# Patient Record
Sex: FEMALE | Race: WHITE | Employment: OTHER | URBAN - METROPOLITAN AREA
[De-identification: names, ages, dates, MRNs, and addresses within clinical notes are randomized per-mention and may not be internally consistent; named-entity substitution may affect disease eponyms.]

---

## 2017-01-10 ENCOUNTER — TELEPHONE (OUTPATIENT)
Dept: INTERNAL MEDICINE CLINIC | Facility: CLINIC | Age: 67
End: 2017-01-10

## 2017-09-28 ENCOUNTER — OFFICE VISIT (OUTPATIENT)
Dept: INTERNAL MEDICINE CLINIC | Facility: CLINIC | Age: 67
End: 2017-09-28

## 2017-09-28 ENCOUNTER — LAB ENCOUNTER (OUTPATIENT)
Dept: LAB | Age: 67
End: 2017-09-28
Attending: INTERNAL MEDICINE
Payer: MEDICARE

## 2017-09-28 VITALS
OXYGEN SATURATION: 98 % | WEIGHT: 165.19 LBS | TEMPERATURE: 98 F | HEIGHT: 63 IN | RESPIRATION RATE: 16 BRPM | BODY MASS INDEX: 29.27 KG/M2 | HEART RATE: 78 BPM | SYSTOLIC BLOOD PRESSURE: 114 MMHG | DIASTOLIC BLOOD PRESSURE: 66 MMHG

## 2017-09-28 DIAGNOSIS — Z00.00 ENCOUNTER FOR ANNUAL HEALTH EXAMINATION: Primary | ICD-10-CM

## 2017-09-28 DIAGNOSIS — Z85.41 HISTORY OF CERVICAL CANCER: ICD-10-CM

## 2017-09-28 DIAGNOSIS — Z12.31 ENCOUNTER FOR SCREENING MAMMOGRAM FOR MALIGNANT NEOPLASM OF BREAST: ICD-10-CM

## 2017-09-28 DIAGNOSIS — E78.5 MILD HYPERLIPIDEMIA: ICD-10-CM

## 2017-09-28 DIAGNOSIS — E55.9 VITAMIN D DEFICIENCY: ICD-10-CM

## 2017-09-28 DIAGNOSIS — Z91.09 ENVIRONMENTAL ALLERGIES: ICD-10-CM

## 2017-09-28 DIAGNOSIS — M85.80 OSTEOPENIA, UNSPECIFIED LOCATION: ICD-10-CM

## 2017-09-28 PROCEDURE — 80061 LIPID PANEL: CPT

## 2017-09-28 PROCEDURE — 96160 PT-FOCUSED HLTH RISK ASSMT: CPT | Performed by: INTERNAL MEDICINE

## 2017-09-28 PROCEDURE — 82306 VITAMIN D 25 HYDROXY: CPT

## 2017-09-28 PROCEDURE — 80053 COMPREHEN METABOLIC PANEL: CPT

## 2017-09-28 PROCEDURE — G0439 PPPS, SUBSEQ VISIT: HCPCS | Performed by: INTERNAL MEDICINE

## 2017-09-28 RX ORDER — FLUTICASONE PROPIONATE 50 MCG
1 SPRAY, SUSPENSION (ML) NASAL DAILY
COMMUNITY
End: 2018-04-04

## 2017-09-28 NOTE — PATIENT INSTRUCTIONS
Libertad Becerra's SCREENING SCHEDULE   Tests on this list are recommended by your physician but may not be covered, or covered at this frequency, by your insurer. Please check with your insurance carrier before scheduling to verify coverage.    PREVEN (once between ages 73-68) IPPE only No results found for this or any previous visit.  Limited to patients who meet one of the following criteria:   • Men who are 73-68 years old and have smoked more than 100 cigarettes in their lifetime   • Anyone with a fa needed after [address] due on 11/04/2017 Please get this Mammogram regularly   Immunizations      Influenza  Covered Annually No orders found for this or any previous visit.  Please get every year    Pneumococcal 13 (Prevnar)  Covered Once after 65 regarding Advance Directives.

## 2017-09-28 NOTE — PROGRESS NOTES
HPI:   Héctor Dove is a 75year old female who presents for a MA (Medicare Advantage) Supervisit (Once per calendar year). Patient doing well . Declined all vaccinations- does not believe in them.   UTD on cscope (2014), had a sessile polp, nex total) by mouth daily. MEDICAL INFORMATION:   She  has a past medical history of CANCER. She  has a past surgical history that includes hysterectomy and colonoscopy (11/13/14).     Her family history includes Breast Cancer (age of onset: 79) in her gums normal   Neck: Supple, symmetrical, trachea midline, no adenopathy;  thyroid: not enlarged, symmetric, no tenderness/mass/nodules; no carotid bruit or JVD   Back:   Symmetric, no curvature, ROM normal, no CVA tenderness   Lungs:   Clear to auscultatio bearing exercises         Ms. Daniel Mcnamara does not currently take aspirin. Patient agrees to start aspirin, see order      Diet assessment: good     Advanced Directive:  Living Will on file in [address]?   Sterling Elias does not have a Living Will on file in more medical conditions?: 0-No         Do you accidently lose urine?: 1-Yes    Do you have difficulty seeing?: 0-No    Do you have any difficulty walking or getting up?: 0-No    Do you have any tripping hazards?: 0-No    Are you on multiple medications?: 0 Electrocardiogram date10/23/2014       Colorectal Cancer Screening      Colonoscopy Screen every 10 years Colonoscopy,5 Years due on 11/13/2019 Update Health Maintenance if applicable    Flex Sigmoidoscopy Screen every 10 years No results found for this or be covered with your prescription benefits, but Medicare does not cover unless Medically needed    Zoster  Not covered by Medicare Part B No vaccine history found This may be covered with your pharmacy  prescription benefits        SPECIFIC DISEASE MONITOR

## 2017-10-02 RX ORDER — ERGOCALCIFEROL 1.25 MG/1
50000 CAPSULE ORAL WEEKLY
Qty: 8 CAPSULE | Refills: 0 | Status: SHIPPED | OUTPATIENT
Start: 2017-10-02 | End: 2017-11-01

## 2017-10-02 NOTE — PROGRESS NOTES
See TE on 10/4/17. Called Pt to discuss results - pt was advised to take vit d 50K weekly for 8 weeks a- medication pended for approval.   Pt also states that at [address] [place] [address] 9/28/17- Pt was to f/u with Derm, not noted. Please generate if appropriate.

## 2017-10-04 ENCOUNTER — TELEPHONE (OUTPATIENT)
Dept: INTERNAL MEDICINE CLINIC | Facility: CLINIC | Age: 67
End: 2017-10-04

## 2017-10-04 DIAGNOSIS — L98.9 SKIN ABNORMALITY: Primary | ICD-10-CM

## 2017-10-04 NOTE — TELEPHONE ENCOUNTER
Patient was in last week for her medicare wellness  At that time Dr Servando Bo told patient that she was a bit concerned about a spot on her skin on her chest  She told her she would give her a name and someone would call her on a referral for that.   Patient

## 2017-10-04 NOTE — TELEPHONE ENCOUNTER
Please advise Derm recommendation- referral pended for your approval if ok. Please review and advise. Thank you.

## 2017-10-04 NOTE — PROGRESS NOTES
HPI:    Patient ID: Randy Paris is a 75year old female. HPI    Review of Systems         Current Outpatient Prescriptions:  ergocalciferol 95699 units Oral Cap Take 1 capsule (50,000 Units total) by mouth once a week.  Disp: 8 capsule Rfl: 0   F

## 2017-10-04 NOTE — TELEPHONE ENCOUNTER
There is nothing in progress note about abnormal skin lesion. Can patient wait until TB returns to office for clarification?

## 2017-10-05 NOTE — TELEPHONE ENCOUNTER
Lm for pt (Ozzy Sanchez per HIPAA) to inform, per TB, derm referral generated and authorized-   Dr. Eloisa Felix  [address] [address] [facility], [address] [place] [address] Rd   Phone: [phone]   To call back at the office if any further questions.

## 2017-11-06 ENCOUNTER — HOSPITAL ENCOUNTER (OUTPATIENT)
Dept: MAMMOGRAPHY | Age: 67
Discharge: HOME | End: 2017-11-06
Attending: INTERNAL MEDICINE
Payer: MEDICARE

## 2017-11-06 DIAGNOSIS — Z12.31 ENCOUNTER FOR SCREENING MAMMOGRAM FOR MALIGNANT NEOPLASM OF BREAST: ICD-10-CM

## 2017-11-06 PROCEDURE — 77067 SCR MAMMO BI INCL CAD: CPT | Performed by: INTERNAL MEDICINE

## 2018-01-17 ENCOUNTER — TELEPHONE (OUTPATIENT)
Dept: INTERNAL MEDICINE CLINIC | Facility: CLINIC | Age: 68
End: 2018-01-17

## 2018-02-05 ENCOUNTER — TELEPHONE (OUTPATIENT)
Dept: INTERNAL MEDICINE CLINIC | Facility: CLINIC | Age: 68
End: 2018-02-05

## 2018-02-05 NOTE — TELEPHONE ENCOUNTER
Patient states she would like to leave a message for Dr Anju Gruber. She wants to know her opinion on rejuvenated stem cell therapy. She states there was an ad in the paper about this with [facility]   She states Dr Anju Gruber can e-mail her

## 2018-02-07 ENCOUNTER — TELEPHONE (OUTPATIENT)
Dept: INTERNAL MEDICINE CLINIC | Facility: CLINIC | Age: 68
End: 2018-02-07

## 2018-02-07 NOTE — TELEPHONE ENCOUNTER
Called pt to inform, per TB, does not have enough information on this to advise her for or against it. Pt verbalized understanding.

## 2018-02-07 NOTE — TELEPHONE ENCOUNTER
I called pt to schedule supervisit pt declined at this time. Pt stated a nurse called her yesterday. Please call back.

## 2018-03-12 ENCOUNTER — OFFICE VISIT (OUTPATIENT)
Dept: INTERNAL MEDICINE CLINIC | Facility: CLINIC | Age: 68
End: 2018-03-12

## 2018-03-12 VITALS
DIASTOLIC BLOOD PRESSURE: 80 MMHG | TEMPERATURE: 98 F | HEIGHT: 63 IN | HEART RATE: 80 BPM | SYSTOLIC BLOOD PRESSURE: 130 MMHG | RESPIRATION RATE: 12 BRPM | WEIGHT: 161.19 LBS | BODY MASS INDEX: 28.56 KG/M2

## 2018-03-12 DIAGNOSIS — H66.93 ACUTE OTITIS MEDIA, BILATERAL: Primary | ICD-10-CM

## 2018-03-12 DIAGNOSIS — M79.602 LEFT ARM PAIN: ICD-10-CM

## 2018-03-12 DIAGNOSIS — R94.31 ABNORMAL EKG: ICD-10-CM

## 2018-03-12 DIAGNOSIS — R06.00 DOE (DYSPNEA ON EXERTION): ICD-10-CM

## 2018-03-12 PROCEDURE — 93000 ELECTROCARDIOGRAM COMPLETE: CPT | Performed by: INTERNAL MEDICINE

## 2018-03-12 PROCEDURE — 99213 OFFICE O/P EST LOW 20 MIN: CPT | Performed by: INTERNAL MEDICINE

## 2018-03-12 RX ORDER — AMOXICILLIN AND CLAVULANATE POTASSIUM 875; 125 MG/1; MG/1
1 TABLET, FILM COATED ORAL 2 TIMES DAILY
Qty: 20 TABLET | Refills: 0 | Status: SHIPPED | OUTPATIENT
Start: 2018-03-12 | End: 2018-03-22

## 2018-03-12 NOTE — PROGRESS NOTES
Miguel Tidwell is a 75year old female. Patient presents with:  Ear Pain: pt is having ear ache on both sides mostly the L side going down L shoulder up to the arm x 1 week.  LB-room 4      HPI:     Patient here with complaints of bilateral ear pain a rashes  RESPIRATORY: + shortness of breath with exertion, no cough  CARDIOVASCULAR: denies chest pain on exertion, no palpatations  GI: denies abdominal pain     EXAM:   /76 (BP Location: Right arm, Patient Position: Sitting, Cuff Size: adult)   Puls

## 2018-03-19 ENCOUNTER — TELEPHONE (OUTPATIENT)
Dept: INTERNAL MEDICINE CLINIC | Facility: CLINIC | Age: 68
End: 2018-03-19

## 2018-03-19 NOTE — TELEPHONE ENCOUNTER
[id number] [address] to wait until 3/26 for stress test  If any CP or difficulty breathing (acute), advise to go to ER  Fatigue may be from infection, still should be on ABX

## 2018-03-19 NOTE — TELEPHONE ENCOUNTER
Pt has stress test scheduled 3-26-18 and is wondering if it is OK to wait until then. Pt was in to see TB 3-12-18. Pt still feels very worn out/exhausted easily, even doing daily chores. Experiencing SOB when doing things. Not experiencing at time of call.

## 2018-03-19 NOTE — TELEPHONE ENCOUNTER
Spoke with patient informed appt. On 3/26/2018 for stress test is ok, patient advised to go to ER if CP or difficulty breathing. Informed fatigue may be from infection, she will continue taking ABX. Patient to call our office if any further qustions.  Kely

## 2018-03-20 ENCOUNTER — TELEPHONE (OUTPATIENT)
Dept: INTERNAL MEDICINE CLINIC | Facility: CLINIC | Age: 68
End: 2018-03-20

## 2018-03-26 ENCOUNTER — HOSPITAL ENCOUNTER (OUTPATIENT)
Dept: CV DIAGNOSTICS | Facility: HOSPITAL | Age: 68
Discharge: HOME | End: 2018-03-26
Attending: INTERNAL MEDICINE
Payer: MEDICARE

## 2018-03-26 DIAGNOSIS — R06.00 DOE (DYSPNEA ON EXERTION): ICD-10-CM

## 2018-03-26 DIAGNOSIS — M79.602 LEFT ARM PAIN: ICD-10-CM

## 2018-03-26 DIAGNOSIS — R94.31 ABNORMAL EKG: ICD-10-CM

## 2018-03-26 PROCEDURE — 93350 STRESS TTE ONLY: CPT | Performed by: INTERNAL MEDICINE

## 2018-03-26 PROCEDURE — 93017 CV STRESS TEST TRACING ONLY: CPT | Performed by: INTERNAL MEDICINE

## 2018-03-26 PROCEDURE — 93018 CV STRESS TEST I&R ONLY: CPT | Performed by: INTERNAL MEDICINE

## 2018-04-03 ENCOUNTER — OFFICE VISIT (OUTPATIENT)
Dept: INTERNAL MEDICINE CLINIC | Facility: CLINIC | Age: 68
End: 2018-04-03

## 2018-04-03 VITALS
RESPIRATION RATE: 16 BRPM | DIASTOLIC BLOOD PRESSURE: 78 MMHG | HEIGHT: 62.5 IN | BODY MASS INDEX: 27.81 KG/M2 | HEART RATE: 92 BPM | WEIGHT: 155 LBS | OXYGEN SATURATION: 96 % | SYSTOLIC BLOOD PRESSURE: 130 MMHG | TEMPERATURE: 98 F

## 2018-04-03 DIAGNOSIS — R06.00 DOE (DYSPNEA ON EXERTION): Primary | ICD-10-CM

## 2018-04-03 DIAGNOSIS — M72.2 PLANTAR FASCIITIS: ICD-10-CM

## 2018-04-03 DIAGNOSIS — R05.8 DRY COUGH: ICD-10-CM

## 2018-04-03 DIAGNOSIS — R63.4 WEIGHT LOSS: ICD-10-CM

## 2018-04-03 PROBLEM — R06.09 DOE (DYSPNEA ON EXERTION): Status: ACTIVE | Noted: 2018-04-03

## 2018-04-03 PROCEDURE — 99214 OFFICE O/P EST MOD 30 MIN: CPT | Performed by: INTERNAL MEDICINE

## 2018-04-04 ENCOUNTER — TELEPHONE (OUTPATIENT)
Dept: INTERNAL MEDICINE CLINIC | Facility: CLINIC | Age: 68
End: 2018-04-04

## 2018-04-04 ENCOUNTER — LAB ENCOUNTER (OUTPATIENT)
Dept: LAB | Age: 68
DRG: 378 | End: 2018-04-04
Attending: INTERNAL MEDICINE
Payer: MEDICARE

## 2018-04-04 ENCOUNTER — HOSPITAL ENCOUNTER (INPATIENT)
Facility: HOSPITAL | Age: 68
LOS: 1 days | Discharge: HOME | DRG: 378 | End: 2018-04-05
Attending: EMERGENCY MEDICINE | Admitting: INTERNAL MEDICINE
Payer: MEDICARE

## 2018-04-04 ENCOUNTER — HOSPITAL ENCOUNTER (OUTPATIENT)
Dept: GENERAL RADIOLOGY | Facility: HOSPITAL | Age: 68
Discharge: HOME | DRG: 378 | End: 2018-04-04
Attending: INTERNAL MEDICINE
Payer: MEDICARE

## 2018-04-04 DIAGNOSIS — K92.2 GASTROINTESTINAL HEMORRHAGE, UNSPECIFIED GASTROINTESTINAL HEMORRHAGE TYPE: Primary | ICD-10-CM

## 2018-04-04 DIAGNOSIS — R05.8 DRY COUGH: ICD-10-CM

## 2018-04-04 DIAGNOSIS — R63.4 WEIGHT LOSS: ICD-10-CM

## 2018-04-04 DIAGNOSIS — D64.9 ANEMIA, UNSPECIFIED TYPE: ICD-10-CM

## 2018-04-04 DIAGNOSIS — R06.00 DOE (DYSPNEA ON EXERTION): ICD-10-CM

## 2018-04-04 PROCEDURE — 85025 COMPLETE CBC W/AUTO DIFF WBC: CPT

## 2018-04-04 PROCEDURE — 84443 ASSAY THYROID STIM HORMONE: CPT

## 2018-04-04 PROCEDURE — 99222 1ST HOSP IP/OBS MODERATE 55: CPT | Performed by: INTERNAL MEDICINE

## 2018-04-04 PROCEDURE — 30233N1 TRANSFUSION OF NONAUTOLOGOUS RED BLOOD CELLS INTO PERIPHERAL VEIN, PERCUTANEOUS APPROACH: ICD-10-PCS | Performed by: EMERGENCY MEDICINE

## 2018-04-04 PROCEDURE — 80053 COMPREHEN METABOLIC PANEL: CPT

## 2018-04-04 PROCEDURE — 71046 X-RAY EXAM CHEST 2 VIEWS: CPT | Performed by: INTERNAL MEDICINE

## 2018-04-04 PROCEDURE — 84439 ASSAY OF FREE THYROXINE: CPT

## 2018-04-04 RX ORDER — ACETAMINOPHEN 325 MG/1
650 TABLET ORAL EVERY 6 HOURS PRN
Status: DISCONTINUED | OUTPATIENT
Start: 2018-04-04 | End: 2018-04-05

## 2018-04-04 RX ORDER — SODIUM CHLORIDE 9 MG/ML
INJECTION, SOLUTION INTRAVENOUS CONTINUOUS
Status: DISCONTINUED | OUTPATIENT
Start: 2018-04-04 | End: 2018-04-05

## 2018-04-04 RX ORDER — ONDANSETRON 2 MG/ML
4 INJECTION INTRAMUSCULAR; INTRAVENOUS EVERY 6 HOURS PRN
Status: DISCONTINUED | OUTPATIENT
Start: 2018-04-04 | End: 2018-04-05

## 2018-04-04 NOTE — TELEPHONE ENCOUNTER
Called s/w patient informed of critical lab result Hgb at 5.7, patient advised very important to go to ER, patient verbalized understanding and agreeable to POC.

## 2018-04-04 NOTE — TELEPHONE ENCOUNTER
To  On call-AS-Spoke with Jamin Snow at [address] lab calling in regards to critical lab results Hgb at 5.7. Please advise.

## 2018-04-04 NOTE — PROGRESS NOTES
Karlene Guerra is a 75year old female. Patient presents with: Follow - Up: OCTAV[facility] RM 4 stress echo came back normal still having shortness of breath  Weight Loss  Fatigue      HPI:     Patient here for f/u-  Seen 3/12 for otitis media and TERAN.  OM sympt skin turned orange.       REVIEW OF SYSTEMS:   GENERAL HEALTH: lost weight, no fevers or chills  SKIN: denies any unusual skin lesions or rashes  RESPIRATORY: + cough  CARDIOVASCULAR: denies chest pain   GI: denies abdominal pain   : no dysuria  NEURO: de

## 2018-04-04 NOTE — ED PROVIDER NOTES
Patient Seen in: [facility] Emergency Department    History   Patient presents with:  Abnormal Result (metabolic, cardiac)    Stated Complaint: abn labs- low hgb    HPI    Gretta Ward is a 18-year-old female presenting to the emergency department for a auscultation bilaterally with no wheezes retractions or rhonchi noted  Cardiovascular exam shows a regular rate and rhythm  Abdomen soft nontender with no rebound tenderness noted  Extremity exam shows no clubbing cyanosis or edema  Skin exam shows no rash Apr 04 1930  ------------------------------------------------------------       MDM   Patient's Hemoccult positive with black stool. The patient was typed and crossed for 1 unit of packed red blood cells but more will likely have to be given.   Patient rem

## 2018-04-04 NOTE — ED INITIAL ASSESSMENT (HPI)
Patient arrives after labs reveal hgb of 5.7. Patient states she has been feeling run down and TERAN for past three weeks.  Patient pale upon arrival.

## 2018-04-05 ENCOUNTER — SURGERY (OUTPATIENT)
Age: 68
End: 2018-04-05

## 2018-04-05 VITALS
HEIGHT: 62 IN | RESPIRATION RATE: 16 BRPM | HEART RATE: 65 BPM | SYSTOLIC BLOOD PRESSURE: 131 MMHG | BODY MASS INDEX: 28.1 KG/M2 | DIASTOLIC BLOOD PRESSURE: 65 MMHG | TEMPERATURE: 98 F | OXYGEN SATURATION: 94 % | WEIGHT: 152.69 LBS

## 2018-04-05 PROCEDURE — 99233 SBSQ HOSP IP/OBS HIGH 50: CPT | Performed by: HOSPITALIST

## 2018-04-05 PROCEDURE — 0DB78ZX EXCISION OF STOMACH, PYLORUS, VIA NATURAL OR ARTIFICIAL OPENING ENDOSCOPIC, DIAGNOSTIC: ICD-10-PCS | Performed by: STUDENT IN AN ORGANIZED HEALTH CARE EDUCATION/TRAINING PROGRAM

## 2018-04-05 PROCEDURE — 0DB68ZX EXCISION OF STOMACH, VIA NATURAL OR ARTIFICIAL OPENING ENDOSCOPIC, DIAGNOSTIC: ICD-10-PCS | Performed by: STUDENT IN AN ORGANIZED HEALTH CARE EDUCATION/TRAINING PROGRAM

## 2018-04-05 RX ORDER — PANTOPRAZOLE SODIUM 40 MG/1
40 TABLET, DELAYED RELEASE ORAL
Status: DISCONTINUED | OUTPATIENT
Start: 2018-04-05 | End: 2018-04-05

## 2018-04-05 RX ORDER — SODIUM CHLORIDE 9 MG/ML
INJECTION, SOLUTION INTRAVENOUS ONCE
Status: DISCONTINUED | OUTPATIENT
Start: 2018-04-05 | End: 2018-04-05

## 2018-04-05 RX ORDER — MIDAZOLAM HYDROCHLORIDE 1 MG/ML
INJECTION INTRAMUSCULAR; INTRAVENOUS
Status: DISCONTINUED | OUTPATIENT
Start: 2018-04-05 | End: 2018-04-05 | Stop reason: HOSPADM

## 2018-04-05 RX ORDER — PANTOPRAZOLE SODIUM 40 MG/1
40 TABLET, DELAYED RELEASE ORAL
Qty: 60 TABLET | Refills: 2 | Status: SHIPPED | OUTPATIENT
Start: 2018-04-05 | End: 2018-07-04

## 2018-04-05 RX ORDER — PANTOPRAZOLE SODIUM 40 MG/1
40 TABLET, DELAYED RELEASE ORAL
Qty: 60 TABLET | Refills: 2 | Status: SHIPPED | OUTPATIENT
Start: 2018-04-05 | End: 2018-04-05

## 2018-04-05 RX ORDER — SODIUM CHLORIDE 9 MG/ML
INJECTION, SOLUTION INTRAVENOUS ONCE
Status: COMPLETED | OUTPATIENT
Start: 2018-04-05 | End: 2018-04-05

## 2018-04-05 NOTE — H&P
[facility]ITAL[facility]  History and Physical     Santykati Montemayor Patient Status:  Emergency    1950 MRN [id number]   Location [address] [facility] Attending Debra Gimenez MD   Hosp Day # 0 PCP Rob Dalton MD     Chief Compla Encounter:  Cholecalciferol (VITAMIN D) 1000 units Oral Tab Take by mouth. Disp:  Rfl:    Fluticasone Propionate 50 MCG/ACT Nasal Suspension 1 spray by Each Nare route daily. Disp:  Rfl:    aspirin 81 MG Oral Tab Take 1 tablet (81 mg total) by mouth daily. upper GI bleed -PRBCs 1 unit at time for goal above 7,    IV PPI, n.p.o., GI consultation for possible EGD tomorrow,    IV fluids   q8 H&H  2. Anemia -most likely due to blood loss, obtain iron panel and reticulocyte count  3.  History of cervical cancer

## 2018-04-05 NOTE — PLAN OF CARE
Maintains adequate nutritional intake (undernourished) Not Progressing      Maintains or returns to baseline bowel function Progressing      Maintains hematologic stability Progressing    Afebrile, denies pain, abdomen is soft and non tender, no BM this sh

## 2018-04-05 NOTE — PAYOR COMM NOTE
--------------  ADMISSION REVIEW       4/4    ED  LATE ADMIT     Patient presents with:  Abnormal Result     Stated Complaint: abn labs- low hgb        13-year-old female presenting to the emergency department for abnormal labs.    + WORSENING FATIGUE  The    EKG     Rate, intervals and axes as noted on EKG Report.   No rate: 79  Rhythm: Sinus Rhythm  Reading: No areas of acute ST segment elevation or depression     ED COURSE   Patient's Hemoccult positive with black stool.

## 2018-04-05 NOTE — PROGRESS NOTES
Alert,oriented. Admitted with Hgb of 5.9.2 units of packed cells completed this shift. No complaint of pain.   NPO for possible EGD this AM.Will recheck hgb at 10am.

## 2018-04-05 NOTE — OPERATIVE REPORT
EGD operative report  Patient Name: Alexandre Eaton  Procedure: Esophagogastroduodenoscopy with biopsy   Indication: anemia, weight loss, early satiety  Attending: Alisha Zelaya M.D.   Consent:  The risks, benefits, and alternatives were discussed with ulcers or stigmata of recent bleeding. Biopsies were obtained from the antrum, body, and fundus, to evaluate for H.pylori.    Duodenum: The duodenal bulb, post-bulbar duodenum, and descending duodenum were normal.  Impression:   1) Large hiatal hernia (7 c

## 2018-04-05 NOTE — CONSULTS
[address]  Report of GI Consultation    Haydenhunter Carrillo Patient Status:  Inpatient    1950 MRN [id number]   [facility] 4NW-A Attending Dm Ortiz MD   Hosp Day # 1 PCP Jyotsna Reyes MD     Date of Admission:  2018  D Medications:    Current Facility-Administered Medications:  0.9%  NaCl infusion  Intravenous Once   ondansetron HCl (ZOFRAN) injection 4 mg 4 mg Intravenous Q6H PRN   0.9%  NaCl infusion  Intravenous Continuous   acetaminophen (TYLENOL) tab 650 mg 650 mg O --    AST  12*   --    --    --    ALKPHO  74   --    --    --    BILT  0.3   --    --    --    HGB  5.7*  5.9*  6.9*  8.2*   WBC  7.6  7.5  14.0*   --        Imaging:  Xr Chest Pa + Lat Chest (cpt=71046)    Result Date: 4/4/2018  CONCLUSION:  No active

## 2018-04-05 NOTE — PLAN OF CARE
NURSING ADMISSION NOTE      Patient admitted via cart  Oriented to room. Safety precautions initiated. Bed in low position. Call light in reach. Pt alert and oriented x4. Blood transfusing at this time, no reactions noted. VSS and afebrile.   Ad

## 2018-04-05 NOTE — PROGRESS NOTES
[facility]  Progress Note     Miguel Tidwell Patient Status:  Inpatient    1950 MRN [id number]   [facility] 4NW-A Attending Sav Kumar MD   Hosp Day # 1 PCP Rocío Bowman MD     Chief Complaint: Anemia    S: Patient f • sodium chloride   Intravenous Once   • pantoprazole (PROTONIX) IV push  40 mg Intravenous 2 times per day   • iron sucrose  200 mg Intravenous Daily       ASSESSMENT / PLAN:     1. Suspected upper GI bleed  1.  Keep NPO  2. IV PPi BID  3. GI to eval for

## 2018-04-06 NOTE — DISCHARGE SUMMARY
[facility]  DISCHARGE SUMMARY     Luda Lopes Patient Status:  Inpatient    1950 MRN [id number]   [facility] 4NW-A Attending No att. providers found   Hosp Day # 1 PCP Alex Bryan MD     Date of Admission: 2018 She is not on any iron supplements nor has she taken any Pepto-Bismol. She denies chest pain palpitations syncope vision changes focal weakness dysuria swelling rash.   She has had colonoscopies as scheduled for routine screening without any abnormalities CONTINUE taking these medications      Instructions Prescription details   aspirin 81 MG Tabs      Take 1 tablet (81 mg total) by mouth daily. Quantity:  30 tablet  Refills:  11     Vitamin D 1000 units Tabs      Take 1,000 Units by mouth daily.    Refi

## 2018-04-09 ENCOUNTER — TELEPHONE (OUTPATIENT)
Dept: INTERNAL MEDICINE CLINIC | Facility: CLINIC | Age: 68
End: 2018-04-09

## 2018-04-09 DIAGNOSIS — K92.2 GASTROINTESTINAL HEMORRHAGE, UNSPECIFIED GASTROINTESTINAL HEMORRHAGE TYPE: Primary | ICD-10-CM

## 2018-04-09 NOTE — TELEPHONE ENCOUNTER
Patient states she has an appointment with Charley Matias this Wednesday at 11:00 and is requesting a referral.  Please advise. She is seeing Dr. Monie Zavaleta. Patient would like a call once referral is placed.

## 2018-04-16 ENCOUNTER — LAB ENCOUNTER (OUTPATIENT)
Dept: LAB | Age: 68
End: 2018-04-16
Attending: NURSE PRACTITIONER
Payer: MEDICARE

## 2018-04-16 DIAGNOSIS — D50.8 OTHER IRON DEFICIENCY ANEMIA: ICD-10-CM

## 2018-04-16 PROCEDURE — 85025 COMPLETE CBC W/AUTO DIFF WBC: CPT

## 2018-05-14 ENCOUNTER — TELEPHONE (OUTPATIENT)
Dept: INTERNAL MEDICINE CLINIC | Facility: CLINIC | Age: 68
End: 2018-05-14

## 2018-05-14 NOTE — TELEPHONE ENCOUNTER
Please advise if ok to use Good Sense Nasal Allergy Spray due to hx of cataract surgery:  Copied information from Good Sense Website:  Fast Relief for Sinus Congestion  Compare to the active ingredient in Afrin® Sinus Nasal Spray.  GoodSense® Sinus Nasal Sp

## 2018-05-14 NOTE — TELEPHONE ENCOUNTER
Patient states she has the following nasal allergy spray:    Good Sense - Distributed by: [employer]    She wants to know if it's safe for her to use due to previous cataract surgery.

## 2018-05-23 ENCOUNTER — LAB ENCOUNTER (OUTPATIENT)
Dept: LAB | Age: 68
End: 2018-05-23
Attending: NURSE PRACTITIONER
Payer: MEDICARE

## 2018-05-23 DIAGNOSIS — D64.9 ANEMIA, UNSPECIFIED TYPE: ICD-10-CM

## 2018-05-23 PROCEDURE — 85025 COMPLETE CBC W/AUTO DIFF WBC: CPT

## 2018-05-31 ENCOUNTER — TELEPHONE (OUTPATIENT)
Dept: INTERNAL MEDICINE CLINIC | Facility: CLINIC | Age: 68
End: 2018-05-31

## 2018-06-01 NOTE — TELEPHONE ENCOUNTER
Called pt to inform not recommend tylenol PM during the day as it has a sleeping aid that will cause pt to be drowzy. May try extra strength Tylenol during the day as needed. Pt verbalized understanding and agreed with POC.

## 2018-06-01 NOTE — TELEPHONE ENCOUNTER
Patient called stating she didn't get a response yesterday. I told her that Dr ELAM didn't have a chance to let us know until almost midnight. She understood. I read her the message from Dr ELAM and she wants to know if there is anything else?   She states s

## 2018-06-01 NOTE — TELEPHONE ENCOUNTER
Called pt to inform, per TB, no aleve due to gastric inflammation. Advised only tylenol prn. Pt verbalized understanding and agreed with POC.

## 2018-06-01 NOTE — TELEPHONE ENCOUNTER
Pt called asking if she can take something other than tylenol PM? She has taken xtra strength before-didn't help-can she take PM during the day?  Call to advise

## 2018-06-07 ENCOUNTER — LAB REQUISITION (OUTPATIENT)
Dept: LAB | Facility: HOSPITAL | Age: 68
End: 2018-06-07
Payer: MEDICARE

## 2018-06-07 DIAGNOSIS — D50.9 IRON DEFICIENCY ANEMIA: ICD-10-CM

## 2018-06-07 PROCEDURE — 88305 TISSUE EXAM BY PATHOLOGIST: CPT | Performed by: INTERNAL MEDICINE

## 2018-06-25 ENCOUNTER — APPOINTMENT (OUTPATIENT)
Dept: LAB | Age: 68
End: 2018-06-25
Attending: INTERNAL MEDICINE
Payer: MEDICARE

## 2018-06-25 DIAGNOSIS — D50.9 IRON DEFICIENCY ANEMIA, UNSPECIFIED IRON DEFICIENCY ANEMIA TYPE: ICD-10-CM

## 2018-06-25 PROCEDURE — 83550 IRON BINDING TEST: CPT

## 2018-06-25 PROCEDURE — 83540 ASSAY OF IRON: CPT

## 2018-07-30 ENCOUNTER — OFFICE VISIT (OUTPATIENT)
Dept: INTERNAL MEDICINE CLINIC | Facility: CLINIC | Age: 68
End: 2018-07-30

## 2018-07-30 ENCOUNTER — TELEPHONE (OUTPATIENT)
Dept: INTERNAL MEDICINE CLINIC | Facility: CLINIC | Age: 68
End: 2018-07-30

## 2018-07-30 ENCOUNTER — HOSPITAL ENCOUNTER (OUTPATIENT)
Dept: GENERAL RADIOLOGY | Age: 68
Discharge: HOME | End: 2018-07-30
Attending: INTERNAL MEDICINE
Payer: MEDICARE

## 2018-07-30 VITALS
TEMPERATURE: 98 F | SYSTOLIC BLOOD PRESSURE: 138 MMHG | DIASTOLIC BLOOD PRESSURE: 88 MMHG | BODY MASS INDEX: 27.64 KG/M2 | HEART RATE: 64 BPM | HEIGHT: 63 IN | WEIGHT: 156 LBS

## 2018-07-30 DIAGNOSIS — M25.561 ACUTE PAIN OF RIGHT KNEE: ICD-10-CM

## 2018-07-30 DIAGNOSIS — M54.31 RIGHT SIDED SCIATICA: Primary | ICD-10-CM

## 2018-07-30 DIAGNOSIS — B35.1 ONYCHOMYCOSIS OF LEFT GREAT TOE: ICD-10-CM

## 2018-07-30 PROCEDURE — 73560 X-RAY EXAM OF KNEE 1 OR 2: CPT | Performed by: INTERNAL MEDICINE

## 2018-07-30 PROCEDURE — 99214 OFFICE O/P EST MOD 30 MIN: CPT | Performed by: INTERNAL MEDICINE

## 2018-07-30 RX ORDER — METHYLPREDNISOLONE 4 MG/1
TABLET ORAL
Qty: 1 KIT | Refills: 0 | Status: SHIPPED | OUTPATIENT
Start: 2018-07-30 | End: 2018-11-07

## 2018-07-30 RX ORDER — PANTOPRAZOLE SODIUM 40 MG/1
40 TABLET, DELAYED RELEASE ORAL
Qty: 30 TABLET | Refills: 0 | Status: SHIPPED | OUTPATIENT
Start: 2018-07-30 | End: 2018-09-11

## 2018-07-30 NOTE — TELEPHONE ENCOUNTER
Called pt to inform, per TB, can try Dr. Marie Houston or any other provider in the group that accomodates with pt's schedule best or will see pt soonest. Pt verbalized understanding and agreed with POC.

## 2018-07-30 NOTE — TELEPHONE ENCOUNTER
Re methylPREDNISolone . Sherrill Magallanes Pt would like directions for taking script clarified. Was told by TB to take with food. Part of the scripts says  to take before bed. Does not eat any food before bed. Can it be taken with antacid pills?

## 2018-07-30 NOTE — TELEPHONE ENCOUNTER
Called pt to inform, per TB, recommends taking with food, even with few crackers is fine. Ok to take with antacids. Pt verbalized understanding and agreed with POC.

## 2018-07-30 NOTE — PROGRESS NOTES
Lizeth Reilly is a 70year old female. Patient presents with:  Back Pain  Knee Pain  Toenail: turning yellow      HPI:     Patient here with her  for several issues-  C/o acute on chronic LBP with radiation to right buttock and leg.  +h/o scia Smokeless tobacco: Never Used                      Alcohol use: Yes           0.0 oz/week     Comment: rare    Family History   Problem Relation Age of Onset   • Cancer Father    • Colon Cancer Father    • Cancer Mo encounter. Meds & Refills for this Visit:  Signed Prescriptions Disp Refills    methylPREDNISolone (MEDROL) 4 MG Oral Tablet Therapy Pack 1 kit 0      Sig: As directed.       Ciclopirox 8 % External Solution 6 mL 1      Sig: Apply layer to toenail ishmael

## 2018-07-30 NOTE — TELEPHONE ENCOUNTER
Please advise if pt should take before bed WITH food. Ok to take with antacids? Please advise, thank you.

## 2018-07-30 NOTE — TELEPHONE ENCOUNTER
Pt was referred to Dr. Fredo Ambrocio today at her appt with TB. Pt isnt able to get into Dr. Fredo Ambrocio until 08/20/18. Wants to know if there is someone that she can get into sooner.      Please advise

## 2018-09-04 ENCOUNTER — MED REC SCAN ONLY (OUTPATIENT)
Dept: INTERNAL MEDICINE CLINIC | Facility: CLINIC | Age: 68
End: 2018-09-04

## 2018-09-11 NOTE — TELEPHONE ENCOUNTER
LOV: 07/30/2018  Future Visit: No appointment scheduled   Last Labs: 05/23/2018 RBC, CBC  Last Rx: 07/30/2018  Per protocol sent to provider

## 2018-09-12 RX ORDER — PANTOPRAZOLE SODIUM 40 MG/1
TABLET, DELAYED RELEASE ORAL
Qty: 30 TABLET | Refills: 5 | Status: SHIPPED | OUTPATIENT
Start: 2018-09-12 | End: 2019-03-10

## 2018-10-11 ENCOUNTER — TELEPHONE (OUTPATIENT)
Dept: INTERNAL MEDICINE CLINIC | Facility: CLINIC | Age: 68
End: 2018-10-11

## 2018-10-11 DIAGNOSIS — Z13.228 SCREENING FOR METABOLIC DISORDER: ICD-10-CM

## 2018-10-11 DIAGNOSIS — Z13.29 SCREENING FOR THYROID DISORDER: ICD-10-CM

## 2018-10-11 DIAGNOSIS — E78.00 PURE HYPERCHOLESTEROLEMIA: Primary | ICD-10-CM

## 2018-10-11 DIAGNOSIS — D64.9 ANEMIA, UNSPECIFIED TYPE: ICD-10-CM

## 2018-10-11 NOTE — TELEPHONE ENCOUNTER
Future Appointments   Date Time Provider Jeovany Fierro   11/7/2018 10:30 AM Agatha Helton MD [facility]     Patient scheduled for her Supervisit. Please place orders with THE [facility]. Patient will be fasting.

## 2018-10-29 ENCOUNTER — LAB ENCOUNTER (OUTPATIENT)
Dept: LAB | Age: 68
End: 2018-10-29
Attending: INTERNAL MEDICINE
Payer: MEDICARE

## 2018-10-29 DIAGNOSIS — D64.9 ANEMIA, UNSPECIFIED TYPE: ICD-10-CM

## 2018-10-29 DIAGNOSIS — Z13.29 SCREENING FOR THYROID DISORDER: ICD-10-CM

## 2018-10-29 DIAGNOSIS — D50.9 IRON DEFICIENCY ANEMIA, UNSPECIFIED IRON DEFICIENCY ANEMIA TYPE: ICD-10-CM

## 2018-10-29 DIAGNOSIS — Z13.228 SCREENING FOR METABOLIC DISORDER: ICD-10-CM

## 2018-10-29 DIAGNOSIS — E78.00 PURE HYPERCHOLESTEROLEMIA: ICD-10-CM

## 2018-10-29 PROCEDURE — 83540 ASSAY OF IRON: CPT

## 2018-10-29 PROCEDURE — 85025 COMPLETE CBC W/AUTO DIFF WBC: CPT

## 2018-10-29 PROCEDURE — 83550 IRON BINDING TEST: CPT

## 2018-10-29 PROCEDURE — 80053 COMPREHEN METABOLIC PANEL: CPT

## 2018-10-29 PROCEDURE — 80061 LIPID PANEL: CPT

## 2018-10-29 PROCEDURE — 84443 ASSAY THYROID STIM HORMONE: CPT

## 2018-11-02 ENCOUNTER — TELEPHONE (OUTPATIENT)
Dept: INTERNAL MEDICINE CLINIC | Facility: CLINIC | Age: 68
End: 2018-11-02

## 2018-11-02 NOTE — TELEPHONE ENCOUNTER
Pt would like to talk to someone about her 10-29-18 labs. Pt is aware there are some being held for her visit.

## 2018-11-02 NOTE — TELEPHONE ENCOUNTER
Notes recorded by Sruthi Regalado MD on 10/30/2018 at 11:12 PM CDT  Labs ok - will discuss details in  E [facility] pt to review lab results. Informed Hb @14. 6. Confirmed OV on 11/7. Pt verbalized understanding and agreed with POC.

## 2018-11-04 NOTE — PROGRESS NOTES
Date: 2018    To: Juancarlos Chisholm  : 1950    I hope this letter finds you doing well. I am writing to inform you of the following:        The results of your recent lab tests were normal.         Please call the office at [phone] if

## 2018-11-07 ENCOUNTER — OFFICE VISIT (OUTPATIENT)
Dept: INTERNAL MEDICINE CLINIC | Facility: CLINIC | Age: 68
End: 2018-11-07

## 2018-11-07 VITALS
HEIGHT: 62.5 IN | BODY MASS INDEX: 27.34 KG/M2 | DIASTOLIC BLOOD PRESSURE: 70 MMHG | TEMPERATURE: 98 F | RESPIRATION RATE: 16 BRPM | SYSTOLIC BLOOD PRESSURE: 122 MMHG | HEART RATE: 84 BPM | WEIGHT: 152.38 LBS

## 2018-11-07 DIAGNOSIS — K21.9 GASTROESOPHAGEAL REFLUX DISEASE, ESOPHAGITIS PRESENCE NOT SPECIFIED: ICD-10-CM

## 2018-11-07 DIAGNOSIS — Z12.31 ENCOUNTER FOR SCREENING MAMMOGRAM FOR MALIGNANT NEOPLASM OF BREAST: ICD-10-CM

## 2018-11-07 DIAGNOSIS — M85.80 OSTEOPENIA, UNSPECIFIED LOCATION: ICD-10-CM

## 2018-11-07 DIAGNOSIS — Z87.19 HISTORY OF GI BLEED: ICD-10-CM

## 2018-11-07 DIAGNOSIS — Z00.00 ENCOUNTER FOR ANNUAL HEALTH EXAMINATION: Primary | ICD-10-CM

## 2018-11-07 DIAGNOSIS — E78.00 PURE HYPERCHOLESTEROLEMIA: ICD-10-CM

## 2018-11-07 PROBLEM — R63.4 WEIGHT LOSS: Status: RESOLVED | Noted: 2018-04-03 | Resolved: 2018-11-07

## 2018-11-07 PROBLEM — R06.00 DOE (DYSPNEA ON EXERTION): Status: RESOLVED | Noted: 2018-04-03 | Resolved: 2018-11-07

## 2018-11-07 PROBLEM — K92.2 GASTROINTESTINAL HEMORRHAGE: Status: RESOLVED | Noted: 2018-04-04 | Resolved: 2018-11-07

## 2018-11-07 PROBLEM — R06.09 DOE (DYSPNEA ON EXERTION): Status: RESOLVED | Noted: 2018-04-03 | Resolved: 2018-11-07

## 2018-11-07 PROCEDURE — 96160 PT-FOCUSED HLTH RISK ASSMT: CPT | Performed by: INTERNAL MEDICINE

## 2018-11-07 PROCEDURE — G0439 PPPS, SUBSEQ VISIT: HCPCS | Performed by: INTERNAL MEDICINE

## 2018-11-07 RX ORDER — NICOTINE POLACRILEX 2 MG
LOZENGE BUCCAL
COMMUNITY
Start: 2018-10-31 | End: 2020-10-02

## 2018-11-07 NOTE — PROGRESS NOTES
HPI:   Brian Gregory is a 70year old female who presents for a MA (Medicare Advantage) Supervisit (Once per calendar year). Patient here for supervisit. Doing great.  Right sciatica completely cleared, no back or leg pain now  Mild hyperlipidem tobacco.    CAGE Alcohol screening   Sundtimbo Paris was screened for Alcohol abuse and had a score of 0 so is at low risk.     Patient Care Team: Patient Care Team:  Raquel Yung MD as PCP - [facility]  [facility] Kelley Kuo [facility] (GASTROENTEROLOGY)    Kin Moreno (11/13/14); colonoscopy; egd; sigmoidoscopy,diagnostic; and ESOPHAGOGASTRODUODENOSCOPY (EGD) (N/A, 4/5/2018).     Her family history includes Breast Cancer (age of onset: 79) in her mother; Cancer in her father, mother, and paternal grandmother; Colon Cance midline, no adenopathy;  thyroid: not enlarged, symmetric, no tenderness/mass/nodules; no carotid bruit or JVD   Back:   Symmetric, no curvature, ROM normal, no CVA tenderness   Lungs:   Clear to auscultation bilaterally, respirations unlabored   Heart:  R for wellness.      Yolie Mai MD, 11/7/2018     General Health     In the past six months, have you lost more than 10 pounds without trying?: 2 - No  Has your appetite been poor?: No  How does the patient maintain a good energy level?: Appropriate Exerc yrs age 33-67, age 72 and older at high risk There are no preventive care reminders to display for this patient. Update Health Maintenance if applicable    Chlamydia  Annually if high risk No results found for: CHLAMYDIA No flowsheet data found.     Jose E Silvestre

## 2018-11-07 NOTE — PATIENT INSTRUCTIONS
Libertad Becerra's SCREENING SCHEDULE   Tests on this list are recommended by your physician but may not be covered, or covered at this frequency, by your insurer. Please check with your insurance carrier before scheduling to verify coverage.    PREVEN IPPE only No results found for this or any previous visit.  Limited to patients who meet one of the following criteria:   • Men who are 73-68 years old and have smoked more than 100 cigarettes in their lifetime   • Anyone with a family history    Colorectal 11/06/2018 Please get this Mammogram regularly   Immunizations      Influenza  Covered Annually No orders found for this or any previous visit.  Please get every year    Pneumococcal 13 (Prevnar)  Covered Once after 65 No orders found for this or any previo

## 2018-12-20 NOTE — TELEPHONE ENCOUNTER
Chief complaint:   Chief Complaint   Patient presents with   • Cancer     fuv       Vitals:  Visit Vitals  /72   Pulse 65   Ht 5' 3.5\" (1.613 m)   Wt 71.2 kg   LMP 01/16/2009   BMI 27.38 kg/m²       HISTORY OF PRESENT ILLNESS       Referring Provider: Dr. Jett Nuñez  Primary Provider:   Travon Rodriguez MD  Hematology oncologist: Dr. Souleymane Espino  Radiation Oncology: Dr. Vaughn    Earnestine Hernandez is a 58 year old female whom presents today for follow up examination.  She carries a diagnosis of Endometrial Cancer.    Patient is a 58-year-old female who originally presented to GYN oncology in December 2014 secondary to a biopsy proven grade 2 endometrial cancer. Patient subsequently underwent Robot assisted Total laparoscopic hysterectomy bilateral salpingo-oophorectomy with sentinel node dissection utilizing firefly technique per Dr. 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 A [place] histochemical staining was done to reveal that the patient's MSH6 expression was positive, putting the patient at risk for Wise syndrome.  The patient undergoes screening colonoscopies and at the direction of Dr. Monique Solitario regarding this.    Patient was subsequently started on chemotherapy under the direction of Dr. Souleymane Espino to include Carbo/Taxol.  She has since received 6 cycles of chemotherapy. She received cycle #6 on 6/22/2015. She then proceeded with 3 sessions vaginal cuff brachy therapy from 7/7/2015-7/20/2015.    Her last imaging was a CT of the chest and pelvis performed on 5/11/2017 which was negative for disease.    Most recent  was 7 on 12/18/2018.    Last colonoscopy was 07/28/2016.    She presents today 12/20/2018 in compliance with her surveillance visit. She states that she is feeling well overall  ROSIBELTCB to schedule Supervisit with no acute concerns. She states she did notice some intermittent spotting over the last few weeks. She denies any overt vaginal bleeding or discharge. She is noncompliant with dialator therapy.  She follows with Dr. Espino. She denies any changes in bowel function. She does have some incontinence but this remains unchanged. She denies abdominal or pelvic pain. Denies changes in appetite, bloating, early satiety, nausea, vomiting. Denies all other subjective complaints today.                                                                                               History of present illness as well as subsequent gynecologic care identified below in problem list.  Additional subjective affirmative responses noted in ROS.        Other significant problems:  Patient Active Problem List    Diagnosis Date Noted   • Endometrial cancer/ treatment SEE CLEAR CELL 01/17/2015     Priority: Earnestine Blanco  MRN: [id number]    12/30/2014: Pt referred to Dr. Topher Gandhi by Dr. Bernie Hastings for further eval & treatment regarding biopsy proven FIGO 2,  spotting on/off for a year.    PCP: Dr. Travon Rodriguez, [phone]. Ob/GYN: Dr. Nuñez. Med/ONC: Dr. Souleymane Espino. Rad/Onc: Dr. Vaughn.    Surgeries: 1/26/2015:  1. Bilateral external ureteral stent placement per Dr. Norman.  2. Robot assisted Total laparoscopic hysterectomy bilateral salpingo-oophorectomy with sentinel node dissection utilizing firefly technique per Dr. Topher Gandhi.  3. Appendectomy per Dr. Momin.  Pathology:  Primary Site: Endometrioid adenocarcinoma  Stage: IIIB FIGO Grade 3 (Poorly Differentiated)  Histologic Type: Endometrioid Adenocarcinoma  Margins:Uninvolved by invasive carcinoma  LVSI: extensive  Regional Lymph Nodes:  (-), 2  Metastasis: Not applicable  Washings:  Postive, Adenocarcinoma, favor endometrioid.    1/26/2015- Addendum: Path also positive for Kath tumor with concurrent cell carcinoma, thought to be  mets from uterus, up staging to IIIB.  This case was sent to [facility], reviewed by Dr. Talavera. Dr. Sun feels that while the majority of the endometrial carcinomas endometrioid in type, there is a minor component of clear cell carcinoma. She favors that the small left ovarian carcinoma represents a metastasis from the endometrial primary, and the tumor is stated as such ever the possibility of 2 separate primaries ovarian endometrial cannot be completely excluded. Immunohistochemical staining was done and showed loss of MS H6 expression, suggesting that the patient may be at risk for Wise syndrome. Recommend genetic counseling.    Biopsies:  12/23/2014: Endometrial bx: Adenocarcinoma of endometrium, endometrioid type, FIGO grade 2.    Treatment:  02/16/15: Per Dr. Vaughn, \"Vaginal brachytherapy was discussed. Target volume as the upper half of the vagina and the target dose is 21 gray in 3 weekly fractions of 7 gray each prescribed to a depth of 0.5 cm. This would be delivered after completion of chemotherapy. We will follow for the consultant's opinion regarding the synchronous malignancy identified in the ovary, although it is unlikely that this will impact on the recommendation for vaginal brachytherapy. \"  02/24-06/22/15: 6 cycles of Carbo AUC 6 & Paclitaxel 175 mg/m2 per Dr. Souleymane Espino.Toxicities: Thrombocytopenia, dose reduced c C5.  07/07/15: RT - 3/3 Vaginal cuff/brachy after chemo, completed 7/20/15  01/08/16:  Completed survivorship.    GYN Case Conference: 2/5/2015:  -4 cycles of chemotherapy and cuff brachy therapy  -if ovary is + do 6 cycles of chemotherapy (Carbo/Taxol), Outside Path Review 1/26/15 shows clear cell component, and final stage of IIIB, she will benefit from 6 cycles of Carboplatin and Taxol chemotherapy.    Surveillance:    CA-125:  11/01/2016=09 01/24/2017=11    Paps:  06/19/2014: SQUAMOUS ATYPICAL SQUAMOUS CELLS OF UNDETERMINED SIGNIFICANCE (ASC-US), HPV  +.    Imagin/19/15: CT C/A/P: Postsurgical changes in the abdomen and pelvis without residual mass or lymphadenopathy. Followup recommended. Lesion in the left superior pubic ramus and pubic bone favoring an intraosseous hemangioma. If no prior imaging of this area is available, this could be confirmed with MRI.    07/29/15: CT c/a/p: showing no new suspicious masses or definite evidence for mets. Stable bone lesion Lt pubic symphysis & superior pubic  ramus. Bowel wall thickening, especially in the region of the rectum. No new bony lesions.    16: CT c/a/p stable, ARPAN.  17: CT c/a/p ARPAN, increasing atelectasis RML and at bases.    Genetic Counselin/23/15-referral recommended. Love Dean, MS, [facility]  4/29/15-saw Yvonne PIMENTEL for GC on 4/8/15. Positive for MSH6-Wise syndrome.    DNA Testin/19/15: Germ line testing with [employer] positive for MSH6 mutation, [id number], see report:  (Pt is a candidate for Pembrulizumab if has a recurrence).                                           • Acquired hallux rigidus of right foot 2018     Priority: Low   • Forefoot varus 2018     Priority: Low   • Osteoporosis 2017     Priority: Low     Bone density 16, lowest T - 2.5; on fosamax, takes vit D3     • H/O screening mammography 2016     Priority: Low     16 bilat screening mammo, BIRADS 1  17: Bilat mammo BIRADS 1  18: bilat mammo BIRADS 2; consider using nora in future 2' dense breast tissue     • Wise syndrome, MSH6 positive 2015     Priority: Low     Tumor showed normal MSI/IHC fuction, sent for genetic testing=  MSH6 positive; Last colonoscopy 2014 , last 16, nl colon;  Dr Guerrero. WILL NEED COLONOSCOPY EVERY 2 YEARS, due:2018    5/19/15, Germ line testing with [employer] positive for MSH6 mutation, [id number], see report:  Pt is a candidate for Pembrulizumab if has a recurrence    Colonoscopy 16= normal  colon, Dr. Guerrero, 7/19/18, repeat colonoscopy and EGD 1 sessile polyp noted, repeat studies in July 2020.     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 Talavera from [facility] favors this is all endometrial ca, Stage IIIB  Therefore proceed carbo + taxol x 6, followed by cuff- Brachy RT, ? During Chemo  C1, 2/24/15, C6, 6/22/15  Vaginal cuff/brachy after chemo, completed 7/20/15  7/29/15, CT CAP negative for tumor recurrence  MSI unstable, consider immune checkpoint inhibitor if she relapses  CT CAP 5/17 negative for recurrence; some increased atelectasis lung bases and right middle lobe, ca 125 11, 4/17     • Osteoarthritis of CMC joint of thumb 12/02/2014     Priority: Low   • MRSA (methicillin resistant staph aureus) culture positive      Priority: Low     7/19/14 boil face+     • Insomnia 06/21/2014     Priority: Low   • Hyperlipidemia 06/21/2014     Priority: Low   • BP check 04/23/2014     Priority: Low   • Noncompliance 04/23/2014     Priority: Low   • Depression 11/07/2012     Priority: Low   • Grieving 11/07/2012     Priority: Low   • Migraine 11/07/2012     Priority: Low   • Plantar fasciitis 11/07/2012     Priority: Low   • Lateral epicondylitis of right elbow 11/07/2012     Priority: Low       PAST MEDICAL, FAMILY AND SOCIAL HISTORY     Medications:  Current Outpatient Prescriptions   Medication   • HYDROcodone-acetaminophen (NORCO) 5-325 MG per tablet   • prochlorperazine (COMPAZINE) 10 MG tablet   • escitalopram (LEXAPRO) 10 MG tablet    • LORazepam (ATIVAN) 0.5 MG tablet   • ibuprofen (MOTRIN) 800 MG tablet   • docusate sodium (COLACE) 100 MG capsule   • acetaminophen (TYLENOL) 325 MG tablet     No current facility-administered medications for this visit.       Allergies:  ALLERGIES:   Allergen Reactions   • Tegaderm [Gelpad Dressing] PRURITUS     From tegaderm on chest-redness and itching at site - tegaderm ok on other parts of body for short term       Past Medical  History/Surgeries:  Past Medical History:   Diagnosis Date   • Arthritis     hands and wrists   • Chronic pain     endometrium grade 2   • Depressive disorder, not elsewhere classified    • Esophagitis 07/19/2018    Dr. Kwan   • Fracture     right large toe   • Hyperplastic colon polyp 07/19/2018    Dr. Kwan   • Insomnia    • Migraine without aura, without mention of intractable migraine without mention of status migrainosus    • MRSA (methicillin resistant Staphylococcus aureus) 2014    Facial lesion   • Pain of left thumb 2016   • Right foot pain 2018   • Special screening for malignant neoplasms, colon    • Tubular adenoma of colon 07/19/2018    Dr. Kwan   • Uterine cancer (CMS/HCC) 2015   • Wears glasses        Past Surgical History:   Procedure Laterality Date   • Appendectomy  01/26/2015    Cabberro    • Colonoscopy  7/28/16    dr. kwan 2018   • Colonoscopy diagnostic  7/18/2014    Dr Kwan/ recall 2016   • Colonoscopy diagnostic  07/19/2018    Dr. Kwan/ Colonoscopy/ Hyperplastic and Tubular adenoma/ Recall 3 years   • D and c      Uterine biopsy   • Esophagogastroduodenoscopy transoral flex w/bx single or mult  07/19/2018    Dr. Kwan/EGD/Esophagitis   • Hand surgery  12/16/2015    right hand   • Hysterectomy  1/26/15    KATELYN CELAYA & BSO   • Robotic assisted hysterectomy  01/26/2015    Oksana ZEPEDA [facility] BSO sentinel nodes   • Vaginal delivery      x 2       Family History:  Family History   Problem Relation Age of Onset   • Diabetes Father    • Hypertension  Father    • Other Father         heart and lung issues/smoker   • Migraines Father    • Cancer Sister         endometrial cancer dx 53   • Migraines Sister    • Hypertension Sister    • Cancer Sister 50        endometrial cancer dx 50   • Diabetes Sister    • Migraines Sister    • Cancer Brother         prostate cancer dx late 50s (58)   • Gastrointestinal Brother         Crohns, pancreatitis   • Migraines Brother    • Genitourinary Mother         uterine bleeding, YOHAN       Social History:  Social History     Tobacco Use   • Smoking status: Never Smoker   • Smokeless tobacco: Never Used   Substance Use Topics   • Alcohol use: Yes     Alcohol/week: 0.0 oz     Comment: On occasions       REVIEW OF SYSTEMS     Review of Systems   Constitutional: Negative for activity change, appetite change, chills, fatigue, fever and unexpected weight change.   HENT: Negative for congestion, mouth sores, postnasal drip, rhinorrhea and sneezing.    Eyes: Negative for visual disturbance.   Respiratory: Negative for cough, shortness of breath and wheezing.    Cardiovascular: Negative for chest pain and leg swelling.   Gastrointestinal: Negative for abdominal distention, abdominal pain, blood in stool, constipation, diarrhea, nausea and vomiting.   Genitourinary: Positive for vaginal bleeding (intermittent spotting). Negative for decreased urine volume, difficulty urinating, dyspareunia, dysuria, frequency, hematuria, pelvic pain, urgency, vaginal discharge and vaginal pain.   Musculoskeletal: Negative for arthralgias and myalgias.   Skin: Negative for color change and rash.   Neurological: Negative for weakness, light-headedness, numbness and headaches.   Hematological: Negative for adenopathy. Does not bruise/bleed easily.   Psychiatric/Behavioral: Negative for dysphoric mood and sleep disturbance. The patient is not nervous/anxious.        PHYSICAL EXAM   Patient's ECOG Perfomance Status is 0.    Physical Exam   Constitutional: She is  oriented to person, place, and time. She appears well-developed and well-nourished. No distress.   HENT:   Head: Normocephalic and atraumatic.   Eyes: EOM are normal. Pupils are equal, round, and reactive to light.   Neck: Normal range of motion. Neck supple. No thyromegaly present.   Cardiovascular: Normal rate, regular rhythm and normal heart sounds.   Pulmonary/Chest: Effort normal and breath sounds normal.   Abdominal: Soft. Normal appearance and bowel sounds are normal. She exhibits no distension. There is no hepatosplenomegaly. There is no tenderness. No hernia.   Genitourinary: Rectum normal and vagina normal. Pelvic exam was performed with patient supine. There is no rash or lesion on the right labia. There is no rash or lesion on the left labia. Right adnexum displays no mass, no tenderness and no fullness. Left adnexum displays no mass, no tenderness and no fullness. No bleeding in the vagina. No vaginal discharge found.       Musculoskeletal: Normal range of motion. She exhibits no edema.   Lymphadenopathy:     She has no cervical adenopathy.     She has no axillary adenopathy.        Right: No inguinal and no supraclavicular adenopathy present.        Left: No inguinal and no supraclavicular adenopathy present.   Neurological: She is alert and oriented to person, place, and time.   Skin: Skin is warm and dry. No rash noted. No erythema.   Psychiatric: She has a normal mood and affect. Her speech is normal and behavior is normal. Judgment and thought content normal. Cognition and memory are normal.   Nursing note and vitals reviewed.      ASSESSMENT/PLAN     Earnestine Hernandez is a 58 year old female with the diagnosis of Endometrial Cancer  Surgical Date:  01/26/2015  Surgical Procedure: Robot assisted Total laparoscopic hysterectomy bilateral salpingo-oophorectomy with sentinel node dissection utilizing firefly technique per Dr. Topher Gandhi   Pathology:  Grade 3 stage IIIB poorly differentiated  endometrioid adenocarcinoma, positive washings, ovary with features of clear cell carcinoma.  Treatment:  Chemotherapy Carbo/Taxol 6 cycles from 2/24/15-6/22/15 under the direction of Dr. Alonzo. 3 sessions or cuff brachy therapy under the direction of Dr Vaughn from 7/7/15-7/20/15.    Endometrial Cancer:  - Now with no clinical evidence of disease.   - s/p 6  Cycles of Carbo/Taxol under the direction of Dr. Espino. Cycle #6 was completed on 6/22/2015.  - s/p cuff brachy therapy under the direction of Dr Vaughn from 7/7/15-7/20/15.  - s/p CT CAP performed on 5/11/2017 which was negative for disease.  - Last  performed on 12/18/2018 which was normal at 7.  - Surveillance will continue q.6 month pelvic exams. Pap smears will be deferred due to her history of radiation therapy.  -Imaging and CA-125 to be under the direction of Dr. Espino.    Urinary incontinence:  -Improved. Overall it has improved    Vaginal Spotting:  -I have advised her to use her vaginal dilator on a regular basis along with coconut oil. I informed her that if this continues or worsens, she should contact our office for a sooner appointment and follow up visit.     All of the patients questions have been answered. She verbalizes an understanding and agreement of the above mentioned plan of care. She states she has no further questions at this time. She is certainly encouraged to contact our clinic should there be any additional questions, concerns or issues, prior to the next follow up visit.     She will therefore return to the clinic in 6  Month(s), sooner as needed for pelvic exam    Key NARANJO

## 2019-02-13 ENCOUNTER — LAB ENCOUNTER (OUTPATIENT)
Dept: LAB | Age: 69
End: 2019-02-13
Attending: INTERNAL MEDICINE
Payer: MEDICARE

## 2019-02-13 DIAGNOSIS — Z87.19 HISTORY OF GI BLEED: ICD-10-CM

## 2019-02-13 LAB
BASOPHILS # BLD AUTO: 0.06 X10(3) UL (ref 0–0.2)
BASOPHILS NFR BLD AUTO: 0.8 %
DEPRECATED RDW RBC AUTO: 46.5 FL (ref 35.1–46.3)
EOSINOPHIL # BLD AUTO: 0.19 X10(3) UL (ref 0–0.7)
EOSINOPHIL NFR BLD AUTO: 2.7 %
ERYTHROCYTE [DISTWIDTH] IN BLOOD BY AUTOMATED COUNT: 13.6 % (ref 11–15)
HCT VFR BLD AUTO: 42.2 % (ref 35–48)
HGB BLD-MCNC: 14.2 G/DL (ref 12–16)
IMM GRANULOCYTES # BLD AUTO: 0.01 X10(3) UL (ref 0–1)
IMM GRANULOCYTES NFR BLD: 0.1 %
LYMPHOCYTES # BLD AUTO: 1.99 X10(3) UL (ref 1–4)
LYMPHOCYTES NFR BLD AUTO: 28.2 %
MCH RBC QN AUTO: 31.1 PG (ref 26–34)
MCHC RBC AUTO-ENTMCNC: 33.6 G/DL (ref 31–37)
MCV RBC AUTO: 92.3 FL (ref 80–100)
MONOCYTES # BLD AUTO: 0.56 X10(3) UL (ref 0.1–1)
MONOCYTES NFR BLD AUTO: 7.9 %
NEUTROPHILS # BLD AUTO: 4.25 X10 (3) UL (ref 1.5–7.7)
NEUTROPHILS # BLD AUTO: 4.25 X10(3) UL (ref 1.5–7.7)
NEUTROPHILS NFR BLD AUTO: 60.3 %
PLATELET # BLD AUTO: 269 10(3)UL (ref 150–450)
RBC # BLD AUTO: 4.57 X10(6)UL (ref 3.8–5.3)
WBC # BLD AUTO: 7.1 X10(3) UL (ref 4–11)

## 2019-02-13 PROCEDURE — 85025 COMPLETE CBC W/AUTO DIFF WBC: CPT

## 2019-03-05 ENCOUNTER — TELEPHONE (OUTPATIENT)
Dept: INTERNAL MEDICINE CLINIC | Facility: CLINIC | Age: 69
End: 2019-03-05

## 2019-03-05 NOTE — TELEPHONE ENCOUNTER
Spoke with patient stating has been taking Pantoprazole for about a year, she is concern because she reads shouldn't be taking anti acid for long time.  Patient stating is doing well on medication but does not want further problems if takes medication long

## 2019-03-05 NOTE — TELEPHONE ENCOUNTER
Re PANTOPRAZOLE SODIUM 40 MG Oral.. Pt has some questions in regards to how long she has been taking this script. Pls call to discuss. Will be home after 3:00 this afternoon.

## 2019-03-06 NOTE — TELEPHONE ENCOUNTER
She has h/o GI bleed as well as reflux so for her, I would continue the protonix.   We can talk about it further in next OV

## 2019-03-06 NOTE — TELEPHONE ENCOUNTER
Spoke with patient informed per TB she would recommend to continue taking protonix, due to her h/o GI bleed as well as reflux. She is aware TB will discuss further at next office visit. Patient verbalized understanding and agreeable to POC.

## 2019-03-11 ENCOUNTER — OFFICE VISIT (OUTPATIENT)
Dept: INTERNAL MEDICINE CLINIC | Facility: CLINIC | Age: 69
End: 2019-03-11
Payer: MEDICARE

## 2019-03-11 VITALS
RESPIRATION RATE: 16 BRPM | BODY MASS INDEX: 28.28 KG/M2 | WEIGHT: 157.63 LBS | TEMPERATURE: 98 F | HEIGHT: 62.5 IN | SYSTOLIC BLOOD PRESSURE: 138 MMHG | HEART RATE: 80 BPM | DIASTOLIC BLOOD PRESSURE: 78 MMHG

## 2019-03-11 DIAGNOSIS — H66.92 LEFT OTITIS MEDIA, UNSPECIFIED OTITIS MEDIA TYPE: ICD-10-CM

## 2019-03-11 DIAGNOSIS — J06.9 URI, ACUTE: Primary | ICD-10-CM

## 2019-03-11 PROCEDURE — 99213 OFFICE O/P EST LOW 20 MIN: CPT | Performed by: INTERNAL MEDICINE

## 2019-03-11 RX ORDER — PANTOPRAZOLE SODIUM 40 MG/1
TABLET, DELAYED RELEASE ORAL
Qty: 30 TABLET | Refills: 3 | Status: SHIPPED | OUTPATIENT
Start: 2019-03-11 | End: 2019-06-17

## 2019-03-11 RX ORDER — AZITHROMYCIN 250 MG/1
TABLET, FILM COATED ORAL
Qty: 6 TABLET | Refills: 0 | Status: SHIPPED | OUTPATIENT
Start: 2019-03-11 | End: 2019-08-21 | Stop reason: ALTCHOICE

## 2019-03-11 NOTE — TELEPHONE ENCOUNTER
LOV:11/7/18 TB  FOV:none on file   LAST RX:9/12/18 40 mg take 1 tab by mouth in the morning 30 tabs 5 refills   LAST LABS:2/13/19 cbc   PER PROTOCOL: to provider

## 2019-03-11 NOTE — PROGRESS NOTES
Claudia Vargas is a 70year old female. Patient presents with:  Sinus Problem: WG exam room 4 patient has sinus and earache promblems. sinuses burning      HPI:     C/o nasal burning pain and left ear pain for 1.5 weeks. No fevers.  Nasal drainage yel Comment:States skin turned orange.       REVIEW OF SYSTEMS:   GENERAL HEALTH:  no fevers or chills  SKIN: denies any unusual skin lesions or rashes  RESPIRATORY: no cough  CARDIOVASCULAR: denies chest pain  GI: denies abdominal pain       EXAM:   /78

## 2019-06-17 ENCOUNTER — TELEPHONE (OUTPATIENT)
Dept: INTERNAL MEDICINE CLINIC | Facility: CLINIC | Age: 69
End: 2019-06-17

## 2019-06-17 NOTE — TELEPHONE ENCOUNTER
PANTOPRAZOLE SODIUM 40 MG Oral Tab EC. Apple Solorzano Pt would like the script to go through her mail order pharmacy, [facility] mail order. Pt states [employer] will be faxing request as well.

## 2019-06-18 RX ORDER — PANTOPRAZOLE SODIUM 40 MG/1
TABLET, DELAYED RELEASE ORAL
Qty: 90 TABLET | Refills: 1 | Status: SHIPPED | OUTPATIENT
Start: 2019-06-18 | End: 2019-06-25

## 2019-06-25 RX ORDER — PANTOPRAZOLE SODIUM 40 MG/1
TABLET, DELAYED RELEASE ORAL
Qty: 90 TABLET | Refills: 1 | Status: SHIPPED | OUTPATIENT
Start: 2019-06-25 | End: 2020-03-31

## 2019-08-21 ENCOUNTER — OFFICE VISIT (OUTPATIENT)
Dept: INTERNAL MEDICINE CLINIC | Facility: CLINIC | Age: 69
End: 2019-08-21
Payer: MEDICARE

## 2019-08-21 VITALS
HEIGHT: 62.5 IN | BODY MASS INDEX: 29.03 KG/M2 | SYSTOLIC BLOOD PRESSURE: 134 MMHG | DIASTOLIC BLOOD PRESSURE: 86 MMHG | OXYGEN SATURATION: 97 % | RESPIRATION RATE: 17 BRPM | TEMPERATURE: 98 F | WEIGHT: 161.81 LBS | HEART RATE: 83 BPM

## 2019-08-21 DIAGNOSIS — M85.80 OSTEOPENIA, UNSPECIFIED LOCATION: ICD-10-CM

## 2019-08-21 DIAGNOSIS — K44.9 HIATAL HERNIA: ICD-10-CM

## 2019-08-21 DIAGNOSIS — K25.9 GASTRIC EROSION, UNSPECIFIED CHRONICITY: ICD-10-CM

## 2019-08-21 DIAGNOSIS — N39.3 STRESS INCONTINENCE OF URINE: ICD-10-CM

## 2019-08-21 DIAGNOSIS — H26.9 CATARACT, UNSPECIFIED CATARACT TYPE, UNSPECIFIED LATERALITY: ICD-10-CM

## 2019-08-21 DIAGNOSIS — E78.00 PURE HYPERCHOLESTEROLEMIA: ICD-10-CM

## 2019-08-21 DIAGNOSIS — M54.50 CHRONIC BILATERAL LOW BACK PAIN WITHOUT SCIATICA: ICD-10-CM

## 2019-08-21 DIAGNOSIS — Z85.41 HISTORY OF CERVICAL CANCER: ICD-10-CM

## 2019-08-21 DIAGNOSIS — Z87.19 HISTORY OF GASTROINTESTINAL BLEEDING: ICD-10-CM

## 2019-08-21 DIAGNOSIS — Z91.09 ENVIRONMENTAL ALLERGIES: ICD-10-CM

## 2019-08-21 DIAGNOSIS — Z00.00 ENCOUNTER FOR ANNUAL HEALTH EXAMINATION: Primary | ICD-10-CM

## 2019-08-21 DIAGNOSIS — R15.9 INCONTINENCE OF FECES, UNSPECIFIED FECAL INCONTINENCE TYPE: ICD-10-CM

## 2019-08-21 DIAGNOSIS — G89.29 CHRONIC BILATERAL LOW BACK PAIN WITHOUT SCIATICA: ICD-10-CM

## 2019-08-21 DIAGNOSIS — Z12.31 VISIT FOR SCREENING MAMMOGRAM: ICD-10-CM

## 2019-08-21 PROBLEM — K92.2 GASTROINTESTINAL HEMORRHAGE, UNSPECIFIED GASTROINTESTINAL HEMORRHAGE TYPE: Status: RESOLVED | Noted: 2018-04-04 | Resolved: 2019-08-21

## 2019-08-21 PROBLEM — M72.2 PLANTAR FASCIITIS: Status: RESOLVED | Noted: 2018-04-03 | Resolved: 2019-08-21

## 2019-08-21 PROCEDURE — 99397 PER PM REEVAL EST PAT 65+ YR: CPT | Performed by: PHYSICIAN ASSISTANT

## 2019-08-21 PROCEDURE — G0439 PPPS, SUBSEQ VISIT: HCPCS | Performed by: PHYSICIAN ASSISTANT

## 2019-08-21 PROCEDURE — 96160 PT-FOCUSED HLTH RISK ASSMT: CPT | Performed by: PHYSICIAN ASSISTANT

## 2019-08-21 RX ORDER — CETIRIZINE HYDROCHLORIDE 10 MG/1
10 TABLET ORAL DAILY
COMMUNITY
End: 2021-09-17

## 2019-08-21 NOTE — PROGRESS NOTES
HPI:   Marcy Duff is a 78year old female who presents for a MA (Medicare Advantage) [facility] (Once per calendar year). Pt has several issues she would like to discuss today. 1.  Stool leakage. After BM will clean herself well.   Some time standard forms performed Face to Face with patient and Family/surrogate (if present), and forms available to patient in AVS       She does NOT have a Power of  for [employer] on file in [place].    Advance care planning including the explanation and d 10/29/2018    GLU 90 10/29/2018        CBC  (most recent labs)   Lab Results   Component Value Date    WBC 7.1 02/13/2019    HGB 14.2 02/13/2019    .0 02/13/2019        ALLERGIES:   She is allergic to morphine sulfate in dextrose.     CURRENT MEDICAT history  ALL/ASTHMA: denies hx of  asthma    EXAM:   /86 (BP Location: Left arm, Patient Position: Sitting, Cuff Size: adult)   Pulse 83   Temp 98 °F (36.7 °C) (Oral)   Resp 17   Ht 62.5\"   Wt 161 lb 12.8 oz   SpO2 97%   BMI 29.12 kg/m²  Estimated b Medicare Assessment. PLAN SUMMARY:   Diagnoses and all orders for this visit:    Encounter for annual health examination - Declines all vaccines. Discussed pneumococcal vaccines and she will consider.   Overdue for mammo, ordered and pt encouraged to s healthy diet, lifestyle, and exercise. Return in about 6 months (around 2/21/2020) for chronic issues.      Cameron Gray PA-C, 8/21/2019     General Health     In the past six months, have you lost more than 10 pounds without trying?: 2 - No  Has you No flowsheet data found. Pap and Pelvic      Pap: Every 3 yrs age 21-68 or Pap+HPV every 5 yrs age 33-67, age 72 and older at high risk There are no preventive care reminders to display for this patient.  Update [employer] if applicable    Chl

## 2019-08-23 ENCOUNTER — LAB ENCOUNTER (OUTPATIENT)
Dept: LAB | Age: 69
End: 2019-08-23
Attending: PHYSICIAN ASSISTANT
Payer: MEDICARE

## 2019-08-23 DIAGNOSIS — Z87.19 HISTORY OF GASTROINTESTINAL BLEEDING: ICD-10-CM

## 2019-08-23 DIAGNOSIS — K25.9 GASTRIC EROSION, UNSPECIFIED CHRONICITY: ICD-10-CM

## 2019-08-23 DIAGNOSIS — E78.00 PURE HYPERCHOLESTEROLEMIA: ICD-10-CM

## 2019-08-23 LAB
ALBUMIN SERPL-MCNC: 4.1 G/DL (ref 3.4–5)
ALBUMIN/GLOB SERPL: 1.2 {RATIO} (ref 1–2)
ALP LIVER SERPL-CCNC: 79 U/L (ref 55–142)
ALT SERPL-CCNC: 21 U/L (ref 13–56)
ANION GAP SERPL CALC-SCNC: 9 MMOL/L (ref 0–18)
AST SERPL-CCNC: 23 U/L (ref 15–37)
BASOPHILS # BLD AUTO: 0.06 X10(3) UL (ref 0–0.2)
BASOPHILS NFR BLD AUTO: 0.9 %
BILIRUB SERPL-MCNC: 0.5 MG/DL (ref 0.1–2)
BUN BLD-MCNC: 13 MG/DL (ref 7–18)
BUN/CREAT SERPL: 16.3 (ref 10–20)
CALCIUM BLD-MCNC: 8.8 MG/DL (ref 8.5–10.1)
CHLORIDE SERPL-SCNC: 105 MMOL/L (ref 98–112)
CHOLEST SMN-MCNC: 212 MG/DL (ref ?–200)
CO2 SERPL-SCNC: 26 MMOL/L (ref 21–32)
CREAT BLD-MCNC: 0.8 MG/DL (ref 0.55–1.02)
DEPRECATED RDW RBC AUTO: 47.4 FL (ref 35.1–46.3)
EOSINOPHIL # BLD AUTO: 0.25 X10(3) UL (ref 0–0.7)
EOSINOPHIL NFR BLD AUTO: 3.8 %
ERYTHROCYTE [DISTWIDTH] IN BLOOD BY AUTOMATED COUNT: 13.9 % (ref 11–15)
GLOBULIN PLAS-MCNC: 3.3 G/DL (ref 2.8–4.4)
GLUCOSE BLD-MCNC: 93 MG/DL (ref 70–99)
HCT VFR BLD AUTO: 43.6 % (ref 35–48)
HDLC SERPL-MCNC: 65 MG/DL (ref 40–59)
HGB BLD-MCNC: 14.1 G/DL (ref 12–16)
IMM GRANULOCYTES # BLD AUTO: 0.01 X10(3) UL (ref 0–1)
IMM GRANULOCYTES NFR BLD: 0.2 %
LDLC SERPL CALC-MCNC: 133 MG/DL (ref ?–100)
LYMPHOCYTES # BLD AUTO: 1.7 X10(3) UL (ref 1–4)
LYMPHOCYTES NFR BLD AUTO: 26.1 %
M PROTEIN MFR SERPL ELPH: 7.4 G/DL (ref 6.4–8.2)
MCH RBC QN AUTO: 30.1 PG (ref 26–34)
MCHC RBC AUTO-ENTMCNC: 32.3 G/DL (ref 31–37)
MCV RBC AUTO: 93.2 FL (ref 80–100)
MONOCYTES # BLD AUTO: 0.57 X10(3) UL (ref 0.1–1)
MONOCYTES NFR BLD AUTO: 8.7 %
NEUTROPHILS # BLD AUTO: 3.93 X10 (3) UL (ref 1.5–7.7)
NEUTROPHILS # BLD AUTO: 3.93 X10(3) UL (ref 1.5–7.7)
NEUTROPHILS NFR BLD AUTO: 60.3 %
NONHDLC SERPL-MCNC: 147 MG/DL (ref ?–130)
OSMOLALITY SERPL CALC.SUM OF ELEC: 290 MOSM/KG (ref 275–295)
PLATELET # BLD AUTO: 290 10(3)UL (ref 150–450)
POTASSIUM SERPL-SCNC: 4.3 MMOL/L (ref 3.5–5.1)
RBC # BLD AUTO: 4.68 X10(6)UL (ref 3.8–5.3)
SODIUM SERPL-SCNC: 140 MMOL/L (ref 136–145)
TRIGL SERPL-MCNC: 70 MG/DL (ref 30–149)
TSI SER-ACNC: 1.35 MIU/ML (ref 0.36–3.74)
VLDLC SERPL CALC-MCNC: 14 MG/DL (ref 0–30)
WBC # BLD AUTO: 6.5 X10(3) UL (ref 4–11)

## 2019-08-23 PROCEDURE — 80053 COMPREHEN METABOLIC PANEL: CPT

## 2019-08-23 PROCEDURE — 85025 COMPLETE CBC W/AUTO DIFF WBC: CPT

## 2019-08-23 PROCEDURE — 84443 ASSAY THYROID STIM HORMONE: CPT

## 2019-08-23 PROCEDURE — 80061 LIPID PANEL: CPT

## 2019-08-26 ENCOUNTER — TELEPHONE (OUTPATIENT)
Dept: INTERNAL MEDICINE CLINIC | Facility: CLINIC | Age: 69
End: 2019-08-26

## 2019-08-26 DIAGNOSIS — E78.00 PURE HYPERCHOLESTEROLEMIA: Primary | ICD-10-CM

## 2019-08-26 NOTE — TELEPHONE ENCOUNTER
CHRISTIAN, I believe she just wanted you to know in case you wanted her to complete any more testing based on her lab results, told her I didn't believe that would be necessary but she wanted you to know anyway.   TAMIKA

## 2019-08-26 NOTE — TELEPHONE ENCOUNTER
I'm sorry I am not sure why she asked you to give me that information, unless it is just an FYI based on all of the issues discussed and orders placed at the last visit. If there is something you think I'm missing or she intended please let me know.   Othe

## 2019-08-26 NOTE — PROGRESS NOTES
Labs are all normal/OK other than mildly elevated chol, it is better than last check. I would suggest she work on diet [facility]) and recheck in 6 mos. I ordered.

## 2019-08-26 NOTE — TELEPHONE ENCOUNTER
Discussed lab results with pt and verbalizes understanding. Pt states she wanted Dorothy to know that her and Venkat Pham will be going on a trip tomorrow and returning 9/8/19. Forwarded to CB.

## 2019-09-11 ENCOUNTER — OFFICE VISIT (OUTPATIENT)
Dept: INTERNAL MEDICINE CLINIC | Facility: CLINIC | Age: 69
End: 2019-09-11
Payer: MEDICARE

## 2019-09-11 VITALS
WEIGHT: 164 LBS | DIASTOLIC BLOOD PRESSURE: 78 MMHG | TEMPERATURE: 99 F | HEIGHT: 62.5 IN | BODY MASS INDEX: 29.43 KG/M2 | HEART RATE: 76 BPM | SYSTOLIC BLOOD PRESSURE: 132 MMHG

## 2019-09-11 DIAGNOSIS — R03.0 ELEVATED BP WITHOUT DIAGNOSIS OF HYPERTENSION: ICD-10-CM

## 2019-09-11 DIAGNOSIS — R30.0 DYSURIA: Primary | ICD-10-CM

## 2019-09-11 LAB
MULTISTIX LOT#: ABNORMAL NUMERIC
PH, URINE: 6.5 (ref 4.5–8)
PROTEIN (URINE DIPSTICK): 100 MG/DL
SPECIFIC GRAVITY: 1 (ref 1–1.03)
URINE-COLOR: YELLOW
UROBILINOGEN,SEMI-QN: 0.2 MG/DL (ref 0–1.9)

## 2019-09-11 PROCEDURE — 81003 URINALYSIS AUTO W/O SCOPE: CPT | Performed by: NURSE PRACTITIONER

## 2019-09-11 PROCEDURE — 87086 URINE CULTURE/COLONY COUNT: CPT | Performed by: NURSE PRACTITIONER

## 2019-09-11 PROCEDURE — 99214 OFFICE O/P EST MOD 30 MIN: CPT | Performed by: NURSE PRACTITIONER

## 2019-09-11 RX ORDER — CEPHALEXIN 500 MG/1
500 CAPSULE ORAL 2 TIMES DAILY
Qty: 14 CAPSULE | Refills: 0 | Status: SHIPPED | OUTPATIENT
Start: 2019-09-11 | End: 2019-09-18

## 2019-09-11 NOTE — PROGRESS NOTES
Luda Lopes is a 78year old female. Patient presents with:  Urinary: LG. Room 10. Urinary burning for almost 3 weeks       HPI:   Here for eval of burning with urination   3 week duration  She thought it was an issue then went on vacation.   Notes Mother 79        early to mid 66's   • Cancer Paternal Grandmother    • Other (heart  problem) Maternal Grandmother         Allergies    Morphine Sulfate In*    RASH    Comment:States skin turned orange.     REVIEW OF SYSTEMS:   GENERAL HEALTH: feels well o

## 2019-09-13 ENCOUNTER — HOSPITAL ENCOUNTER (OUTPATIENT)
Dept: MAMMOGRAPHY | Age: 69
Discharge: HOME | End: 2019-09-13
Attending: PHYSICIAN ASSISTANT
Payer: MEDICARE

## 2019-09-13 DIAGNOSIS — Z12.31 VISIT FOR SCREENING MAMMOGRAM: ICD-10-CM

## 2019-09-13 PROCEDURE — 77067 SCR MAMMO BI INCL CAD: CPT | Performed by: PHYSICIAN ASSISTANT

## 2019-09-13 PROCEDURE — 77063 BREAST TOMOSYNTHESIS BI: CPT | Performed by: PHYSICIAN ASSISTANT

## 2019-10-07 ENCOUNTER — HOSPITAL ENCOUNTER (OUTPATIENT)
Dept: PHYSICAL THERAPY | Facility: HOSPITAL | Age: 69
Setting detail: THERAPIES SERIES
Discharge: HOME | End: 2019-10-07
Attending: PHYSICIAN ASSISTANT
Payer: MEDICARE

## 2019-10-07 DIAGNOSIS — R15.9 INCONTINENCE OF FECES, UNSPECIFIED FECAL INCONTINENCE TYPE: ICD-10-CM

## 2019-10-07 DIAGNOSIS — N39.3 STRESS INCONTINENCE OF URINE: ICD-10-CM

## 2019-10-07 DIAGNOSIS — M54.50 CHRONIC BILATERAL LOW BACK PAIN WITHOUT SCIATICA: ICD-10-CM

## 2019-10-07 DIAGNOSIS — G89.29 CHRONIC BILATERAL LOW BACK PAIN WITHOUT SCIATICA: ICD-10-CM

## 2019-10-07 PROCEDURE — 97161 PT EVAL LOW COMPLEX 20 MIN: CPT

## 2019-10-07 PROCEDURE — 97110 THERAPEUTIC EXERCISES: CPT

## 2019-10-07 NOTE — PROGRESS NOTES
SPINE EVALUATION:   Referring Physician: Mr. Isak Mccullough  Diagnosis: Stress incontinence of urine (N39.3)  Incontinence of feces, unspecified fecal incontinence type (R15.9)  Chronic bilateral low back pain without sciatica (M54.5,G89.29)     Date of Service: 1 findings, pathology, POC and HEP. Pt voiced understanding and performs HEP correctly without reported pain. Skilled Physical Therapy is medically necessary to address the above impairments and reach functional goals.      Precautions:  None  OBJECTIVE:   O active  Patient was instructed in and issued a HEP for: abdominal bracing 10 sec x10, isometric hip adduction 10 sec x10, supine-U/LE lift x10, clamshell x10 R/L-issued latex free GTB, sciatic nerve glides x20, HSS x30, BID      Charges: PT Eval Low Comple in planning and for this course of care. Thank you for your referral. Please co-sign or sign and return this letter via fax as soon as possible to [phone].  If you have any questions, please contact me at Dept: [phone]    Sincerely,  Electronic

## 2019-10-10 ENCOUNTER — HOSPITAL ENCOUNTER (OUTPATIENT)
Dept: PHYSICAL THERAPY | Facility: HOSPITAL | Age: 69
Setting detail: THERAPIES SERIES
Discharge: HOME | End: 2019-10-10
Attending: PHYSICIAN ASSISTANT
Payer: MEDICARE

## 2019-10-10 PROCEDURE — 97110 THERAPEUTIC EXERCISES: CPT

## 2019-10-10 NOTE — PROGRESS NOTES
Dx: Stress incontinence of urine (N39.3)  Incontinence of feces, unspecified fecal incontinence type (R15.9)  Chronic bilateral low back pain without sciatica (M54.5,G89.29)          Authorized # of Visits:  Medicare, no visit limit , POC 10        Next MD core stabilization. Manual therapy to include: joint mobilizations to restore normal joint mechanics and decrease pain, instrument assisted soft tissue mobilization. Modalities: e-stim, heat/cold for pain relief.  .     Date: 10/10/2019  Tx#: 2/10 Date:

## 2019-10-14 ENCOUNTER — HOSPITAL ENCOUNTER (OUTPATIENT)
Dept: PHYSICAL THERAPY | Facility: HOSPITAL | Age: 69
Setting detail: THERAPIES SERIES
Discharge: HOME | End: 2019-10-14
Attending: PHYSICIAN ASSISTANT
Payer: MEDICARE

## 2019-10-14 PROCEDURE — 97110 THERAPEUTIC EXERCISES: CPT

## 2019-10-14 NOTE — PROGRESS NOTES
Dx: Stress incontinence of urine (N39.3)  Incontinence of feces, unspecified fecal incontinence type (R15.9)  Chronic bilateral low back pain without sciatica (M54.5,G89.29)          Authorized # of Visits:  Medicare, 10 approved 10/7-1/4 , POC 10        N in PT         Plan: Continue plan of care as pt tolerates with focus on Therapeutic exercises to include: ROM, stretching, strengthening, HEP, proprioceptive/balance training, pelvic and core stabilization.   Manual therapy to include: joint mobilizations t

## 2019-10-17 ENCOUNTER — HOSPITAL ENCOUNTER (OUTPATIENT)
Dept: PHYSICAL THERAPY | Facility: HOSPITAL | Age: 69
Setting detail: THERAPIES SERIES
Discharge: HOME | End: 2019-10-17
Attending: PHYSICIAN ASSISTANT
Payer: MEDICARE

## 2019-10-17 PROCEDURE — 97112 NEUROMUSCULAR REEDUCATION: CPT

## 2019-10-17 PROCEDURE — 97110 THERAPEUTIC EXERCISES: CPT

## 2019-10-17 NOTE — PROGRESS NOTES
Dx: Stress incontinence of urine (N39.3)  Incontinence of feces, unspecified fecal incontinence type (R15.9)  Chronic bilateral low back pain without sciatica (M54.5,G89.29)          Authorized # of Visits:  Medicare, 10 approved 10/7-1/4 , POC 10        N joint mobilizations to restore normal joint mechanics and decrease pain, instrument assisted soft tissue mobilization. Modalities: e-stim, heat/cold for pain relief. .     Date: 10/10/2019  Tx#: 2/10 Date: 10/14  Tx#: 3/ Date: 10/17  Tx#: 4/ Date:    Tx#: 5

## 2019-10-21 ENCOUNTER — HOSPITAL ENCOUNTER (OUTPATIENT)
Dept: PHYSICAL THERAPY | Facility: HOSPITAL | Age: 69
Setting detail: THERAPIES SERIES
Discharge: HOME | End: 2019-10-21
Attending: PHYSICIAN ASSISTANT
Payer: MEDICARE

## 2019-10-21 PROCEDURE — 97110 THERAPEUTIC EXERCISES: CPT

## 2019-10-21 PROCEDURE — 97140 MANUAL THERAPY 1/> REGIONS: CPT

## 2019-10-21 NOTE — PROGRESS NOTES
Dx: Stress incontinence of urine (N39.3)  Incontinence of feces, unspecified fecal incontinence type (R15.9)  Chronic bilateral low back pain without sciatica (M54.5,G89.29)          Authorized # of Visits:  Medicare, 10 approved 10/7-1/4 , POC 10        N Plan: Continue plan of care as pt tolerates with focus on Therapeutic exercises to include: ROM, stretching, strengthening, HEP, proprioceptive/balance training, pelvic and core stabilization.   Manual therapy to include: joint mobilizations to rest daily living         pelvic floor muscles anatomy, physiology, defn    Willgel 10 repetitions 3x/day, 10 sec on/10 sec off, 2 sec on 2-4 sec off -HEP  Issued HEP Answered all questions regarding home exercise program  Shuttle  DLP 31#   w iso hip add + pelvi

## 2019-10-24 ENCOUNTER — HOSPITAL ENCOUNTER (OUTPATIENT)
Dept: PHYSICAL THERAPY | Facility: HOSPITAL | Age: 69
Setting detail: THERAPIES SERIES
Discharge: HOME | End: 2019-10-24
Attending: PHYSICIAN ASSISTANT
Payer: MEDICARE

## 2019-10-24 PROCEDURE — 97110 THERAPEUTIC EXERCISES: CPT

## 2019-10-24 NOTE — PROGRESS NOTES
Dx: Stress incontinence of urine (N39.3)  Incontinence of feces, unspecified fecal incontinence type (R15.9)  Chronic bilateral low back pain without sciatica (M54.5,G89.29)          Authorized # of Visits:  Medicare, 10 approved 10/7-1/4 , POC 10        N have decreased paraspinal mm tension to tolerate standing >2 hours minutes for activities of daily living and home activities   · Pt will demonstrate improved core strength to be able to perform lifting with <1/10 pain   · Pt will be independent and compli onto bar 2x10 STM lumbosacral, mid scapular paraspinals-minimal    NuStep 5min L5     Supine-  BKC ball x20    LTR ball x20    HSS x1' ea    U/LE lift-(heel touch modification with LE)         Squats holding onto bar 2x10     Sit on ball-  S/S, F/B x15

## 2019-10-28 ENCOUNTER — HOSPITAL ENCOUNTER (OUTPATIENT)
Dept: PHYSICAL THERAPY | Facility: HOSPITAL | Age: 69
Setting detail: THERAPIES SERIES
Discharge: HOME | End: 2019-10-28
Attending: PHYSICIAN ASSISTANT
Payer: MEDICARE

## 2019-10-28 PROCEDURE — 97110 THERAPEUTIC EXERCISES: CPT

## 2019-10-28 PROCEDURE — 97112 NEUROMUSCULAR REEDUCATION: CPT

## 2019-10-28 NOTE — PROGRESS NOTES
Dx: Stress incontinence of urine (N39.3)  Incontinence of feces, unspecified fecal incontinence type (R15.9)  Chronic bilateral low back pain without sciatica (M54.5,G89.29)          Authorized # of Visits:  Medicare, 10 approved 10/7-1/4 , POC 10        N supporting lumbar spine with standing activities  · Pt will report improved symptom centralization and absence of radicular symptoms into buttocks for 3 consecutive days to improve function with ADL   · Pt will have decreased paraspinal mm tension to danna SKC + OP x10 ea    Sit on ball-  Quick flicks S59    HSS x1' ea    U/LE lift-(heel touch modification with LE)  large airex-heel toe FW/BW x5    Alternate lunge BOSU w abdominal bracing x15 ea    Lateral lunge BOSU w abdominal bracing x10    Squats holding abdominal bracing   With wobble board    50 reps ea     Hip abd BTB x20 R/L      Charges: NM Re-edx1 (15 min) TEx2(30)   Total Timed Treatment: 45 min     Total Treatment Time: 45 min

## 2019-10-31 ENCOUNTER — APPOINTMENT (OUTPATIENT)
Dept: PHYSICAL THERAPY | Facility: HOSPITAL | Age: 69
End: 2019-10-31
Attending: PHYSICIAN ASSISTANT
Payer: MEDICARE

## 2019-11-01 NOTE — TELEPHONE ENCOUNTER
Chief Complaint   Patient presents with     Blood Draw     labs drawn via venipuncture by RN in lab     /70 (BP Location: Left arm, Patient Position: Chair, Cuff Size: Adult Regular)   Pulse 82   Temp 97.8  F (36.6  C) (Oral)   Wt 77.5 kg (170 lb 14.4 oz)   SpO2 99%   BMI 23.97 kg/m      Labs collected and sent from left antecubital venipuncture in lab by RN. Pt tolerated well.   Pt checked in for next appointment.    Maria Teresa Bourgeois RN     ROSIBELTCB to schedule supervisit.

## 2019-11-04 ENCOUNTER — HOSPITAL ENCOUNTER (OUTPATIENT)
Dept: PHYSICAL THERAPY | Facility: HOSPITAL | Age: 69
Setting detail: THERAPIES SERIES
Discharge: HOME | End: 2019-11-04
Attending: INTERNAL MEDICINE
Payer: MEDICARE

## 2019-11-04 PROCEDURE — 97112 NEUROMUSCULAR REEDUCATION: CPT

## 2019-11-04 PROCEDURE — 97110 THERAPEUTIC EXERCISES: CPT

## 2019-11-04 NOTE — PROGRESS NOTES
Discharge Summary  Pt has attended 8 visits in Physical Therapy.       Dx:  Chronic bilateral low back pain without sciatica (M54.5,G89.29)     Stress incontinence of urine (N39.3)  Incontinence of feces, unspecified fecal incontinence type (R15.9)      Au -MET  · Pt will have decreased paraspinal mm tension to tolerate standing >2 hours for activities of daily living and home activities -MET  · Pt will demonstrate improved core strength to be able to perform lifting with <1/10 pain -MET  · Pt will be indepe modification with LE)  large airex-heel toe FW/BW x5    Alternate lunge BOSU w abdominal bracing x15 ea    Lateral lunge BOSU w abdominal bracing x10    Squats holding onto bar 2x10 STM lumbosacral, mid scapular paraspinals-minimal    NuStep 5min L5     Haider 31#   w iso hip add + pelvic floor muscle contraction + abdominal bracing     50 reps ea     Lifting mechanics    abdominal bracing with lifting #5 floor<>overhead x10     Shuttle  DLP 31#   w iso hip add + pelvic floor muscle contraction + abdominal braci

## 2019-12-13 ENCOUNTER — OFFICE VISIT (OUTPATIENT)
Dept: INTERNAL MEDICINE CLINIC | Facility: CLINIC | Age: 69
End: 2019-12-13
Payer: MEDICARE

## 2019-12-13 VITALS
SYSTOLIC BLOOD PRESSURE: 155 MMHG | HEART RATE: 84 BPM | HEIGHT: 62.5 IN | RESPIRATION RATE: 16 BRPM | DIASTOLIC BLOOD PRESSURE: 95 MMHG | WEIGHT: 160.81 LBS | BODY MASS INDEX: 28.85 KG/M2

## 2019-12-13 DIAGNOSIS — R35.0 URINARY FREQUENCY: ICD-10-CM

## 2019-12-13 DIAGNOSIS — I10 BENIGN ESSENTIAL HTN: ICD-10-CM

## 2019-12-13 DIAGNOSIS — N36.8 URETHRAL CYST: Primary | ICD-10-CM

## 2019-12-13 PROCEDURE — 99214 OFFICE O/P EST MOD 30 MIN: CPT | Performed by: INTERNAL MEDICINE

## 2019-12-13 PROCEDURE — 81003 URINALYSIS AUTO W/O SCOPE: CPT | Performed by: INTERNAL MEDICINE

## 2019-12-13 RX ORDER — LOSARTAN POTASSIUM 25 MG/1
25 TABLET ORAL DAILY
Qty: 30 TABLET | Refills: 1 | Status: SHIPPED | OUTPATIENT
Start: 2019-12-13 | End: 2020-03-03

## 2019-12-13 NOTE — PROGRESS NOTES
Tone Tran is a 78year old female. Patient presents with:  Lump: cn room 4: lump /growth in private area  Urinary: irritation and frequency  Blood Pressure: high at home and here      HPI:     Patient c/o lump in private area.  She felt it yester Never Smoker      Smokeless tobacco: Never Used    Alcohol use: No      Alcohol/week: 0.0 standard drinks    Drug use: No    Family History   Problem Relation Age of Onset   • Cancer Father    • Colon Cancer Father    • Cancer Mother    • Breast Cancer Mot tablet (25 mg total) by mouth daily. Imaging & Consults:  UROLOGY - INTERNAL    Return in about 5 weeks (around 1/17/2020) for BP. There are no Patient Instructions on file for this visit.       The patient indicates understanding of these issues and

## 2020-01-17 ENCOUNTER — APPOINTMENT (OUTPATIENT)
Dept: LAB | Age: 70
End: 2020-01-17
Attending: INTERNAL MEDICINE
Payer: MEDICARE

## 2020-01-17 ENCOUNTER — OFFICE VISIT (OUTPATIENT)
Dept: INTERNAL MEDICINE CLINIC | Facility: CLINIC | Age: 70
End: 2020-01-17
Payer: MEDICARE

## 2020-01-17 VITALS
DIASTOLIC BLOOD PRESSURE: 86 MMHG | HEIGHT: 62.5 IN | HEART RATE: 72 BPM | RESPIRATION RATE: 16 BRPM | WEIGHT: 161.19 LBS | BODY MASS INDEX: 28.92 KG/M2 | SYSTOLIC BLOOD PRESSURE: 142 MMHG

## 2020-01-17 DIAGNOSIS — I10 ESSENTIAL HYPERTENSION: Primary | ICD-10-CM

## 2020-01-17 DIAGNOSIS — I10 ESSENTIAL HYPERTENSION: ICD-10-CM

## 2020-01-17 LAB
ANION GAP SERPL CALC-SCNC: 6 MMOL/L (ref 0–18)
BUN BLD-MCNC: 11 MG/DL (ref 7–18)
BUN/CREAT SERPL: 13.8 (ref 10–20)
CALCIUM BLD-MCNC: 8.9 MG/DL (ref 8.5–10.1)
CHLORIDE SERPL-SCNC: 108 MMOL/L (ref 98–112)
CO2 SERPL-SCNC: 28 MMOL/L (ref 21–32)
CREAT BLD-MCNC: 0.8 MG/DL (ref 0.55–1.02)
GLUCOSE BLD-MCNC: 86 MG/DL (ref 70–99)
OSMOLALITY SERPL CALC.SUM OF ELEC: 293 MOSM/KG (ref 275–295)
PATIENT FASTING Y/N/NP: NO
POTASSIUM SERPL-SCNC: 4.5 MMOL/L (ref 3.5–5.1)
SODIUM SERPL-SCNC: 142 MMOL/L (ref 136–145)

## 2020-01-17 PROCEDURE — 99213 OFFICE O/P EST LOW 20 MIN: CPT | Performed by: INTERNAL MEDICINE

## 2020-01-17 PROCEDURE — 80048 BASIC METABOLIC PNL TOTAL CA: CPT

## 2020-01-17 RX ORDER — LOSARTAN POTASSIUM 50 MG/1
50 TABLET ORAL DAILY
Qty: 90 TABLET | Refills: 1 | Status: SHIPPED | OUTPATIENT
Start: 2020-01-17 | End: 2020-07-25

## 2020-01-17 NOTE — PROGRESS NOTES
Abhilash Carr is a 78year old female. Patient presents with:  Blood Pressure: cn room 4: b/p check       HPI:     Patient here for BP check.  On losartan 25mg daily, BP is better but not at goal.  No HA/LH/DZ  Saw urologist for urinary irritation/le Cancer Father    • Cancer Mother    • Breast Cancer Mother 79        early to mid 66's   • Cancer Paternal Grandmother    • Other (heart  problem) Maternal Grandmother         Allergies    Morphine Sulfate In*    RASH    Comment:States skin turned orange.

## 2020-01-21 ENCOUNTER — TELEPHONE (OUTPATIENT)
Dept: INTERNAL MEDICINE CLINIC | Facility: CLINIC | Age: 70
End: 2020-01-21

## 2020-01-21 DIAGNOSIS — R35.0 URINARY FREQUENCY: Primary | ICD-10-CM

## 2020-01-21 DIAGNOSIS — N39.3 STRESS INCONTINENCE OF URINE: ICD-10-CM

## 2020-01-22 NOTE — TELEPHONE ENCOUNTER
Ricardo Mera MD  [facility] Clinical Staff 10 hours ago (11:06 PM)  Pelvic floor therapy ordered for patient to try for urinary issues.  I touched base with urology service who saw her recently and this is one of the things that was suggested.  Please let he

## 2020-03-03 ENCOUNTER — OFFICE VISIT (OUTPATIENT)
Dept: INTERNAL MEDICINE CLINIC | Facility: CLINIC | Age: 70
End: 2020-03-03
Payer: MEDICARE

## 2020-03-03 VITALS
RESPIRATION RATE: 16 BRPM | HEIGHT: 62.5 IN | BODY MASS INDEX: 29.36 KG/M2 | DIASTOLIC BLOOD PRESSURE: 84 MMHG | SYSTOLIC BLOOD PRESSURE: 138 MMHG | HEART RATE: 72 BPM | WEIGHT: 163.63 LBS

## 2020-03-03 DIAGNOSIS — M54.9 UPPER BACK PAIN ON LEFT SIDE: ICD-10-CM

## 2020-03-03 DIAGNOSIS — M54.2 POSTERIOR NECK PAIN: Primary | ICD-10-CM

## 2020-03-03 PROCEDURE — 99213 OFFICE O/P EST LOW 20 MIN: CPT | Performed by: INTERNAL MEDICINE

## 2020-03-03 RX ORDER — CYCLOBENZAPRINE HCL 5 MG
5 TABLET ORAL NIGHTLY PRN
Qty: 30 TABLET | Refills: 1 | Status: SHIPPED | OUTPATIENT
Start: 2020-03-03 | End: 2020-10-02 | Stop reason: ALTCHOICE

## 2020-03-03 NOTE — PROGRESS NOTES
Juancarlos Chisholm is a 78year old female. Patient presents with:  Pain: cn room 4: pain in neck and back for weeks       HPI:     C/o Left sided upper back and neck pain for 2-3 weeks.  Lifted heavy groceries with left arm and thinking that may have con standard drinks    Drug use: No    Family History   Problem Relation Age of Onset   • Cancer Father    • Colon Cancer Father    • Cancer Mother    • Breast Cancer Mother 79        early to mid 66's   • Cancer Paternal Grandmother    • Other (heart  problem

## 2020-03-06 ENCOUNTER — TELEPHONE (OUTPATIENT)
Dept: INTERNAL MEDICINE CLINIC | Facility: CLINIC | Age: 70
End: 2020-03-06

## 2020-03-06 DIAGNOSIS — M54.9 UPPER BACK PAIN ON LEFT SIDE: ICD-10-CM

## 2020-03-06 DIAGNOSIS — M54.2 POSTERIOR NECK PAIN: ICD-10-CM

## 2020-03-06 RX ORDER — CYCLOBENZAPRINE HCL 5 MG
5 TABLET ORAL NIGHTLY PRN
Qty: 30 TABLET | Refills: 1 | Status: CANCELLED | OUTPATIENT
Start: 2020-03-06

## 2020-03-06 RX ORDER — TIZANIDINE 4 MG/1
4 TABLET ORAL NIGHTLY PRN
Qty: 30 TABLET | Refills: 0 | Status: SHIPPED | OUTPATIENT
Start: 2020-03-06 | End: 2020-10-02

## 2020-03-06 NOTE — TELEPHONE ENCOUNTER
Onamia did not received the prescription for patient; Disp Refills Start End    cyclobenzaprine 5 MG Oral Tab 30 tablet 1 3/3/2020     Sig - Route:  Take 1 tablet (5 mg total) by mouth nightly as needed for Muscle spasms. - Oral

## 2020-03-06 NOTE — TELEPHONE ENCOUNTER
I sent in something similar b/c insurance seems to be giving problem with flexeril, needs PA and then pt will not have it over the weekend.

## 2020-03-20 ENCOUNTER — TELEPHONE (OUTPATIENT)
Dept: INTERNAL MEDICINE CLINIC | Facility: CLINIC | Age: 70
End: 2020-03-20

## 2020-03-31 RX ORDER — PANTOPRAZOLE SODIUM 40 MG/1
TABLET, DELAYED RELEASE ORAL
Qty: 90 TABLET | Refills: 1 | Status: SHIPPED | OUTPATIENT
Start: 2020-03-31 | End: 2020-10-06

## 2020-03-31 NOTE — TELEPHONE ENCOUNTER
Last Ov: 3/3/20, TB, acute  Upcoming appt: 4/17/20, TB  Last labs: BMP 1/17/20  Last Rx: pantoprazole 40mg, #90, 1R 6/25/19    Per Protocol, not on protocol. Rx pending.

## 2020-05-26 ENCOUNTER — TELEPHONE (OUTPATIENT)
Dept: INTERNAL MEDICINE CLINIC | Facility: CLINIC | Age: 70
End: 2020-05-26

## 2020-05-26 DIAGNOSIS — Z23 NEED FOR SHINGLES VACCINE: Primary | ICD-10-CM

## 2020-05-26 NOTE — TELEPHONE ENCOUNTER
LOV 3/3/20 with TB. Immunizations/Injections    None     Patient states she had chicken pox as a child between ages 4-6. Pended Shingrix vaccine. OK to order?

## 2020-05-26 NOTE — TELEPHONE ENCOUNTER
Future Appointments   Date Time Provider Jeovany Fierro   5/29/2020  9:45 AM [facility] NURSE [facility]     Please place orders for Shindrix.

## 2020-05-29 ENCOUNTER — NURSE ONLY (OUTPATIENT)
Dept: INTERNAL MEDICINE CLINIC | Facility: CLINIC | Age: 70
End: 2020-05-29
Payer: MEDICARE

## 2020-05-29 PROCEDURE — 90750 HZV VACC RECOMBINANT IM: CPT | Performed by: INTERNAL MEDICINE

## 2020-05-29 PROCEDURE — 90471 IMMUNIZATION ADMIN: CPT | Performed by: INTERNAL MEDICINE

## 2020-07-17 ENCOUNTER — TELEPHONE (OUTPATIENT)
Dept: CASE MANAGEMENT | Age: 70
End: 2020-07-17

## 2020-07-25 RX ORDER — LOSARTAN POTASSIUM 50 MG/1
50 TABLET ORAL DAILY
Qty: 90 TABLET | Refills: 0 | Status: SHIPPED | OUTPATIENT
Start: 2020-07-25 | End: 2020-10-22

## 2020-07-27 ENCOUNTER — TELEPHONE (OUTPATIENT)
Dept: CASE MANAGEMENT | Age: 70
End: 2020-07-27

## 2020-08-14 ENCOUNTER — TELEPHONE (OUTPATIENT)
Dept: CASE MANAGEMENT | Age: 70
End: 2020-08-14

## 2020-08-17 ENCOUNTER — TELEPHONE (OUTPATIENT)
Dept: CASE MANAGEMENT | Age: 70
End: 2020-08-17

## 2020-08-17 DIAGNOSIS — R35.0 URINARY FREQUENCY: ICD-10-CM

## 2020-08-17 DIAGNOSIS — Z85.41 HISTORY OF CERVICAL CANCER: ICD-10-CM

## 2020-08-17 DIAGNOSIS — I10 BENIGN ESSENTIAL HTN: Primary | ICD-10-CM

## 2020-08-17 DIAGNOSIS — E78.5 MILD HYPERLIPIDEMIA: ICD-10-CM

## 2020-08-17 NOTE — TELEPHONE ENCOUNTER
Medicare Advantage Supervisit scheduled for 10/2/20, patient requested to have \"routine labs\" drawn prior to the visit and to please have staff notify her when orders are in the system. Please place orders.  Thank you

## 2020-09-30 ENCOUNTER — LAB ENCOUNTER (OUTPATIENT)
Dept: LAB | Age: 70
End: 2020-09-30
Attending: INTERNAL MEDICINE
Payer: MEDICARE

## 2020-09-30 DIAGNOSIS — R35.0 URINARY FREQUENCY: ICD-10-CM

## 2020-09-30 DIAGNOSIS — I10 BENIGN ESSENTIAL HTN: ICD-10-CM

## 2020-09-30 DIAGNOSIS — E78.5 MILD HYPERLIPIDEMIA: ICD-10-CM

## 2020-09-30 DIAGNOSIS — Z85.41 HISTORY OF CERVICAL CANCER: ICD-10-CM

## 2020-09-30 PROCEDURE — 80053 COMPREHEN METABOLIC PANEL: CPT

## 2020-09-30 PROCEDURE — 80061 LIPID PANEL: CPT

## 2020-09-30 PROCEDURE — 84443 ASSAY THYROID STIM HORMONE: CPT

## 2020-09-30 PROCEDURE — 85025 COMPLETE CBC W/AUTO DIFF WBC: CPT

## 2020-10-01 ENCOUNTER — TELEPHONE (OUTPATIENT)
Dept: INTERNAL MEDICINE CLINIC | Facility: CLINIC | Age: 70
End: 2020-10-01

## 2020-10-01 DIAGNOSIS — Z23 NEED FOR SHINGLES VACCINE: Primary | ICD-10-CM

## 2020-10-01 NOTE — TELEPHONE ENCOUNTER
Pt coming in for her 2nd Shingles shot tomorrow. Please advise.     Future Appointments   Date Time Provider Jeovany Fierro   10/2/2020  1:40 PM Christiane Tillman MD [facility]

## 2020-10-02 ENCOUNTER — OFFICE VISIT (OUTPATIENT)
Dept: INTERNAL MEDICINE CLINIC | Facility: CLINIC | Age: 70
End: 2020-10-02
Payer: MEDICARE

## 2020-10-02 VITALS
SYSTOLIC BLOOD PRESSURE: 116 MMHG | RESPIRATION RATE: 16 BRPM | WEIGHT: 165 LBS | HEART RATE: 84 BPM | DIASTOLIC BLOOD PRESSURE: 78 MMHG | HEIGHT: 62.4 IN | TEMPERATURE: 97 F | BODY MASS INDEX: 29.98 KG/M2

## 2020-10-02 DIAGNOSIS — M54.41 CHRONIC RIGHT-SIDED LOW BACK PAIN WITH RIGHT-SIDED SCIATICA: ICD-10-CM

## 2020-10-02 DIAGNOSIS — R92.1 BREAST CALCIFICATIONS: ICD-10-CM

## 2020-10-02 DIAGNOSIS — Z12.31 ENCOUNTER FOR SCREENING MAMMOGRAM FOR MALIGNANT NEOPLASM OF BREAST: ICD-10-CM

## 2020-10-02 DIAGNOSIS — Z85.41 HISTORY OF CERVICAL CANCER: ICD-10-CM

## 2020-10-02 DIAGNOSIS — G89.29 CHRONIC RIGHT-SIDED LOW BACK PAIN WITH RIGHT-SIDED SCIATICA: ICD-10-CM

## 2020-10-02 DIAGNOSIS — E78.00 PURE HYPERCHOLESTEROLEMIA: ICD-10-CM

## 2020-10-02 DIAGNOSIS — Z78.0 POST-MENOPAUSAL: ICD-10-CM

## 2020-10-02 DIAGNOSIS — I10 BENIGN ESSENTIAL HTN: ICD-10-CM

## 2020-10-02 DIAGNOSIS — Z00.00 ENCOUNTER FOR ANNUAL HEALTH EXAMINATION: Primary | ICD-10-CM

## 2020-10-02 PROCEDURE — G0439 PPPS, SUBSEQ VISIT: HCPCS | Performed by: INTERNAL MEDICINE

## 2020-10-02 PROCEDURE — 99397 PER PM REEVAL EST PAT 65+ YR: CPT | Performed by: INTERNAL MEDICINE

## 2020-10-02 PROCEDURE — 3078F DIAST BP <80 MM HG: CPT | Performed by: INTERNAL MEDICINE

## 2020-10-02 PROCEDURE — 3074F SYST BP LT 130 MM HG: CPT | Performed by: INTERNAL MEDICINE

## 2020-10-02 PROCEDURE — 96160 PT-FOCUSED HLTH RISK ASSMT: CPT | Performed by: INTERNAL MEDICINE

## 2020-10-02 PROCEDURE — 3008F BODY MASS INDEX DOCD: CPT | Performed by: INTERNAL MEDICINE

## 2020-10-02 NOTE — PATIENT INSTRUCTIONS
Libertad Becerra's SCREENING SCHEDULE   Tests on this list are recommended by your physician but may not be covered, or covered at this frequency, by your insurer. Please check with your insurance carrier before scheduling to verify coverage.    PREVEN IPPE only No results found for this or any previous visit.  Limited to patients who meet one of the following criteria:   • Men who are 73-68 years old and have smoked more than 100 cigarettes in their lifetime   • Anyone with a family history    Colorectal 09/13/2020 Please get this Mammogram regularly   Immunizations      Influenza  Covered Annually No orders found for this or any previous visit.  Please get every year    Pneumococcal 13 (Prevnar)  Covered Once after 65 No orders found for this or any previo

## 2020-10-02 NOTE — PROGRESS NOTES
HPI:   Abhilash Carr is a 69year old female who presents for a MA (Medicare Advantage) Supervisit (Once per calendar year).     Encounter for annual health examination- referred for mammogram and dexa, she finished shingrix series and will get flu all  Feeling down, depressed, or hopeless (over the last two weeks)?: Not at all  PHQ-2 SCORE: 0      Advanced Directive:  She does NOT have a Living Will on file in [address] [place] [address] Rd.    Not Discussed     She does NOT have a Power of  for [employer] on file in Units by mouth daily.          MEDICAL INFORMATION:   She  has a past medical history of Abdominal hernia, Back pain, Belching, Body piercing, CANCER, Chest pain, Fatigue, Flatulence/gas pain/belching, Frequent urination, Hemorrhoids, History of blood trans Ears:  Normal TM's and external ear canals, both ears   Nose: deferred   Throat: deferred   Neck: Supple, symmetrical, trachea midline, no adenopathy;  thyroid: not enlarged, symmetric, no tenderness/mass/nodules; no carotid bruit or JVD   Back:   Symmet CPM  Pure hypercholesterolemia (mild)- diet controlled, deferred statin therapy at this time         Diet assessment: good     PLAN:  The patient indicates understanding of these issues and agrees to the plan.   Reinforced healthy diet, lifestyle, and exerc Diabetics, FHx Glaucoma, AA>50, > 65 No flowsheet data found. Bone Density Screening      Dexascan Every two years Last Dexa Scan:   XR DEXA BONE DENSITOMETRY (CPT=77080) 11/10/2016    No flowsheet data found.     Pap and Pelvic      Pap: Every 3 (mmol/L)   Date Value   09/30/2020 4.2   02/03/2009 4.7     POTASSIUM (mmol/L)   Date Value   08/29/2015 4.3    No flowsheet data found.     Creatinine  Annually Creatinine, Serum (mg/dL)   Date Value   02/03/2009 0.70     CREATININE (mg/dL)   Date Value

## 2020-10-06 ENCOUNTER — HOSPITAL ENCOUNTER (OUTPATIENT)
Dept: GENERAL RADIOLOGY | Facility: HOSPITAL | Age: 70
Discharge: HOME | End: 2020-10-06
Attending: INTERNAL MEDICINE
Payer: MEDICARE

## 2020-10-06 DIAGNOSIS — G89.29 CHRONIC RIGHT-SIDED LOW BACK PAIN WITH RIGHT-SIDED SCIATICA: ICD-10-CM

## 2020-10-06 DIAGNOSIS — M54.41 CHRONIC RIGHT-SIDED LOW BACK PAIN WITH RIGHT-SIDED SCIATICA: ICD-10-CM

## 2020-10-06 PROCEDURE — 72114 X-RAY EXAM L-S SPINE BENDING: CPT | Performed by: INTERNAL MEDICINE

## 2020-10-06 RX ORDER — PANTOPRAZOLE SODIUM 40 MG/1
TABLET, DELAYED RELEASE ORAL
Qty: 90 TABLET | Refills: 0 | Status: SHIPPED | OUTPATIENT
Start: 2020-10-06 | End: 2021-01-04

## 2020-10-06 NOTE — TELEPHONE ENCOUNTER
Last VISIT 10/02/20    Last REFILL 03/31/20 qty 90 w/1 refill    Last LABS 09/30/20 TSH, CMP, Lipid, CBC done     No Future Appointments      Per PROTOCOL? Not on protocol      Please Approve or Deny.

## 2020-10-07 ENCOUNTER — OFFICE VISIT (OUTPATIENT)
Dept: PAIN CLINIC | Facility: CLINIC | Age: 70
End: 2020-10-07
Payer: MEDICARE

## 2020-10-07 VITALS
OXYGEN SATURATION: 97 % | BODY MASS INDEX: 30.36 KG/M2 | DIASTOLIC BLOOD PRESSURE: 78 MMHG | WEIGHT: 165 LBS | HEART RATE: 81 BPM | SYSTOLIC BLOOD PRESSURE: 118 MMHG | HEIGHT: 62 IN

## 2020-10-07 DIAGNOSIS — M50.30 DDD (DEGENERATIVE DISC DISEASE), CERVICAL: Primary | ICD-10-CM

## 2020-10-07 DIAGNOSIS — M51.36 DDD (DEGENERATIVE DISC DISEASE), LUMBAR: ICD-10-CM

## 2020-10-07 DIAGNOSIS — M54.16 LUMBAR RADICULOPATHY: ICD-10-CM

## 2020-10-07 DIAGNOSIS — M54.12 CERVICAL RADICULOPATHY: ICD-10-CM

## 2020-10-07 PROBLEM — M51.369 DDD (DEGENERATIVE DISC DISEASE), LUMBAR: Status: ACTIVE | Noted: 2020-10-07

## 2020-10-07 PROCEDURE — 3078F DIAST BP <80 MM HG: CPT | Performed by: ANESTHESIOLOGY

## 2020-10-07 PROCEDURE — 3008F BODY MASS INDEX DOCD: CPT | Performed by: ANESTHESIOLOGY

## 2020-10-07 PROCEDURE — 99203 OFFICE O/P NEW LOW 30 MIN: CPT | Performed by: ANESTHESIOLOGY

## 2020-10-07 PROCEDURE — 3074F SYST BP LT 130 MM HG: CPT | Performed by: ANESTHESIOLOGY

## 2020-10-07 NOTE — PROGRESS NOTES
PHYSICAL EXAM:   Physical Exam   Constitutional:           Patient presents in office today with reported pain in lower back, right hamstring, right calf, left side of neck, left trapezius and shoulder    Current pain level reported = 1/10    Location of

## 2020-10-07 NOTE — H&P
Name: Tone Tran   : [id number]   DOS: 10/7/2020     Chief complaint: Low back and neck pain    History of present illness:  Tone Tran is a 69year old female referred for evaluation of a longstanding history of low back and neck pain. COLONOSCOPY  11/13/14   • COLONOSCOPY     • EGD     • ESOPHAGOGASTRODUODENOSCOPY (EGD) N/A 4/5/2018    Performed by Makayla Zavaleta MD at [facility] ENDOSCOPY   • HYSTERECTOMY      partial hysterec   • [address]        Family History   Problem R Longus:   5    5  Peroneals:     5    5  Gastrocsoleus:     5    5    Radiology diagnostic studies:   Lumbar x-ray reviewed independently and with the patient. Overall, it with evidence of mild degenerative changes.   Does have anterior listhesis of L4 and

## 2020-10-13 ENCOUNTER — TELEPHONE (OUTPATIENT)
Dept: SURGERY | Facility: CLINIC | Age: 70
End: 2020-10-13

## 2020-10-13 NOTE — TELEPHONE ENCOUNTER
Spoke with Leanne Bradley @ [address] [place] [address] [place] @ [facility].  She stated she is calling to provide cheaper alternatives for patient's MRI and needs to know if patient would be willing to go to the [facility] or [address] [facility] which per

## 2020-10-13 NOTE — TELEPHONE ENCOUNTER
Gave patient Keisha Ortiz number and asked her to call and get locations. Once she gets it scheduled I asked her to call us and let us know where she is having it done at.

## 2020-10-13 NOTE — TELEPHONE ENCOUNTER
Pt states she is having imaging done at [facility] in [place], [place] on 10/19 at 10am.

## 2020-10-14 RX ORDER — DIAZEPAM 10 MG/1
10 TABLET ORAL AS NEEDED
Qty: 2 TABLET | Refills: 0 | Status: SHIPPED | OUTPATIENT
Start: 2020-10-14 | End: 2020-10-21

## 2020-10-14 NOTE — TELEPHONE ENCOUNTER
Patient called and wanted something to take prior to her 2 MRI's that are scheduled for 10/19/20. Please call patient to let her know if something will be sent to her pharmacy.

## 2020-10-16 ENCOUNTER — HOSPITAL ENCOUNTER (OUTPATIENT)
Dept: MAMMOGRAPHY | Age: 70
Discharge: HOME | End: 2020-10-16
Attending: INTERNAL MEDICINE
Payer: MEDICARE

## 2020-10-16 ENCOUNTER — HOSPITAL ENCOUNTER (OUTPATIENT)
Dept: BONE DENSITY | Age: 70
Discharge: HOME | End: 2020-10-16
Attending: INTERNAL MEDICINE
Payer: MEDICARE

## 2020-10-16 DIAGNOSIS — Z12.31 ENCOUNTER FOR SCREENING MAMMOGRAM FOR MALIGNANT NEOPLASM OF BREAST: ICD-10-CM

## 2020-10-16 DIAGNOSIS — Z78.0 POST-MENOPAUSAL: ICD-10-CM

## 2020-10-16 DIAGNOSIS — R92.1 BREAST CALCIFICATIONS: ICD-10-CM

## 2020-10-16 PROCEDURE — 77080 DXA BONE DENSITY AXIAL: CPT | Performed by: INTERNAL MEDICINE

## 2020-10-16 PROCEDURE — 77067 SCR MAMMO BI INCL CAD: CPT | Performed by: INTERNAL MEDICINE

## 2020-10-16 PROCEDURE — 77063 BREAST TOMOSYNTHESIS BI: CPT | Performed by: INTERNAL MEDICINE

## 2020-10-19 ENCOUNTER — HOSPITAL ENCOUNTER (OUTPATIENT)
Dept: MRI IMAGING | Age: 70
Discharge: HOME | End: 2020-10-19
Attending: ANESTHESIOLOGY
Payer: MEDICARE

## 2020-10-19 DIAGNOSIS — M50.30 DDD (DEGENERATIVE DISC DISEASE), CERVICAL: ICD-10-CM

## 2020-10-19 DIAGNOSIS — M54.16 LUMBAR RADICULOPATHY: ICD-10-CM

## 2020-10-19 PROCEDURE — 72141 MRI NECK SPINE W/O DYE: CPT | Performed by: ANESTHESIOLOGY

## 2020-10-19 PROCEDURE — 72148 MRI LUMBAR SPINE W/O DYE: CPT | Performed by: ANESTHESIOLOGY

## 2020-10-21 ENCOUNTER — OFFICE VISIT (OUTPATIENT)
Dept: PAIN CLINIC | Facility: CLINIC | Age: 70
End: 2020-10-21
Payer: MEDICARE

## 2020-10-21 VITALS
BODY MASS INDEX: 30.36 KG/M2 | HEIGHT: 62 IN | WEIGHT: 165 LBS | SYSTOLIC BLOOD PRESSURE: 132 MMHG | HEART RATE: 88 BPM | OXYGEN SATURATION: 98 % | DIASTOLIC BLOOD PRESSURE: 92 MMHG

## 2020-10-21 DIAGNOSIS — M51.36 DDD (DEGENERATIVE DISC DISEASE), LUMBAR: ICD-10-CM

## 2020-10-21 DIAGNOSIS — M54.12 CERVICAL RADICULOPATHY: Primary | ICD-10-CM

## 2020-10-21 PROCEDURE — 99213 OFFICE O/P EST LOW 20 MIN: CPT | Performed by: ANESTHESIOLOGY

## 2020-10-21 PROCEDURE — 3008F BODY MASS INDEX DOCD: CPT | Performed by: ANESTHESIOLOGY

## 2020-10-21 PROCEDURE — 3075F SYST BP GE 130 - 139MM HG: CPT | Performed by: ANESTHESIOLOGY

## 2020-10-21 PROCEDURE — 3080F DIAST BP >= 90 MM HG: CPT | Performed by: ANESTHESIOLOGY

## 2020-10-21 NOTE — PROGRESS NOTES
Name: Abhilash Carr   : [id number]   DOS: 10/21/2020     Pain Clinic Follow Up Visit:   Patient presents with:  [facility] F/U      Abhilash Carr is a 69year old female with neck and low back pain here today to review her recent cervica MRI reviewed independently with the patient. There is evidence of multilevel degenerative disc disease. No central canal stenosis noted.   Does have some facet arthropathy and neuroforaminal stenosis      ASSESSMENT AND PLAN:   Cervical radiculopathy  (pr

## 2020-10-21 NOTE — PROGRESS NOTES
Patient presents in office today with reported pain in left side of neck and trapezius    Current pain level reported = 1/10

## 2020-10-22 RX ORDER — LOSARTAN POTASSIUM 50 MG/1
50 TABLET ORAL DAILY
Qty: 90 TABLET | Refills: 1 | Status: SHIPPED | OUTPATIENT
Start: 2020-10-22 | End: 2021-05-06

## 2020-10-22 NOTE — TELEPHONE ENCOUNTER
Prescription Refill Request - Patient advised can take 48-72 hours. Name of Medication (strength, dose, qty requested:     LOSARTAN POTASSIUM 50 MG Oral Tab 90 tablet 0 7/25/2020    Sig:   Take 1 tablet (50 mg total) by mouth daily.      Route:   Oral

## 2021-01-04 RX ORDER — PANTOPRAZOLE SODIUM 40 MG/1
TABLET, DELAYED RELEASE ORAL
Qty: 90 TABLET | Refills: 1 | Status: SHIPPED | OUTPATIENT
Start: 2021-01-04 | End: 2021-07-01

## 2021-01-04 NOTE — TELEPHONE ENCOUNTER
Last Ov: 10/2/20, TB, CPE    Last labs: CBC, Lipid, CMP, TSH w Ref 9/30/20  Last Rx: pantoprazole 40mg, #90, 0R 10/6/20    No future appointments. Per Protocol - not on protocol, Rx pending.

## 2021-01-21 ENCOUNTER — PATIENT MESSAGE (OUTPATIENT)
Dept: INTERNAL MEDICINE CLINIC | Facility: CLINIC | Age: 71
End: 2021-01-21

## 2021-03-13 DIAGNOSIS — Z23 NEED FOR VACCINATION: ICD-10-CM

## 2021-05-06 RX ORDER — LOSARTAN POTASSIUM 50 MG/1
50 TABLET ORAL DAILY
Qty: 90 TABLET | Refills: 0 | Status: SHIPPED | OUTPATIENT
Start: 2021-05-06 | End: 2021-08-19

## 2021-05-06 NOTE — TELEPHONE ENCOUNTER
Last Ov: 10/2/20, TB, CPE  Last labs: CBC, Lipid, CMP, TSH w Ref 9/30/20  Last Rx: losartan 50mg, #90, 1R 10/22/20    No future appointments. Per Protocol - failed, appt due. Rx pending.

## 2021-07-01 RX ORDER — PANTOPRAZOLE SODIUM 40 MG/1
TABLET, DELAYED RELEASE ORAL
Qty: 90 TABLET | Refills: 0 | Status: SHIPPED | OUTPATIENT
Start: 2021-07-01 | End: 2021-09-17

## 2021-07-01 NOTE — TELEPHONE ENCOUNTER
Last Ov: 10/2/20, TB, CPE  Last labs: CBC, Lipid, CMP, TSH w Ref 9/30/20  Last Rx: pantoprazole 40mg, #90, 1R 1/4/21    No future appointments. Per Protocol - not on protocol, pending.

## 2021-07-23 NOTE — TELEPHONE ENCOUNTER
Supervisit scheduled for   Future Appointments   Date Time Provider Jeovany Fierro   9/10/2021  7:30 AM REFERENCE [id number] Ref [address]   9/17/2021  9:00 AM Carolann Madsen MD [facility]        Orders to  THE [facility]   aware must fast no call

## 2021-08-14 ENCOUNTER — PATIENT MESSAGE (OUTPATIENT)
Dept: INTERNAL MEDICINE CLINIC | Facility: CLINIC | Age: 71
End: 2021-08-14

## 2021-08-16 NOTE — TELEPHONE ENCOUNTER
From: Karlene Guerra  To: Loraine Javier MD  Sent: 8/14/2021 10:02 AM CDT  Subject: Non-Urgent Medical Question    Hi Dr. Ivone Hoyt,    I would like to know if my referral for my sciatica PT is still good? You ordered it the end of Oct. of last   year.

## 2021-08-19 RX ORDER — LOSARTAN POTASSIUM 50 MG/1
50 TABLET ORAL DAILY
Qty: 30 TABLET | Refills: 0 | Status: SHIPPED | OUTPATIENT
Start: 2021-08-19 | End: 2021-09-17

## 2021-09-09 ENCOUNTER — TELEPHONE (OUTPATIENT)
Dept: INTERNAL MEDICINE CLINIC | Facility: CLINIC | Age: 71
End: 2021-09-09

## 2021-09-09 DIAGNOSIS — Z00.00 ROUTINE GENERAL MEDICAL EXAMINATION AT A HEALTH CARE FACILITY: Primary | ICD-10-CM

## 2021-09-09 DIAGNOSIS — E78.00 PURE HYPERCHOLESTEROLEMIA: ICD-10-CM

## 2021-09-09 DIAGNOSIS — I10 BENIGN ESSENTIAL HTN: ICD-10-CM

## 2021-09-09 NOTE — TELEPHONE ENCOUNTER
Patient has lab appt at 7:30am tomorrow.     Orders to THE [facility]  aware must fast no call back required  Future Appointments   Date Time Provider Jeovany Fierro   9/10/2021  7:30 AM REFERENCE [id number] Ref [address]   9/17/2021  9:00 AM Lara Au,

## 2021-09-10 ENCOUNTER — LAB ENCOUNTER (OUTPATIENT)
Dept: LAB | Age: 71
End: 2021-09-10
Attending: INTERNAL MEDICINE
Payer: MEDICARE

## 2021-09-10 DIAGNOSIS — Z00.00 ROUTINE GENERAL MEDICAL EXAMINATION AT A HEALTH CARE FACILITY: ICD-10-CM

## 2021-09-10 DIAGNOSIS — E78.00 PURE HYPERCHOLESTEROLEMIA: ICD-10-CM

## 2021-09-10 DIAGNOSIS — I10 BENIGN ESSENTIAL HTN: ICD-10-CM

## 2021-09-10 LAB
ALBUMIN SERPL-MCNC: 3.9 G/DL (ref 3.4–5)
ALBUMIN/GLOB SERPL: 1.1 {RATIO} (ref 1–2)
ALP LIVER SERPL-CCNC: 88 U/L
ALT SERPL-CCNC: 18 U/L
ANION GAP SERPL CALC-SCNC: 6 MMOL/L (ref 0–18)
AST SERPL-CCNC: 19 U/L (ref 15–37)
BASOPHILS # BLD AUTO: 0.06 X10(3) UL (ref 0–0.2)
BASOPHILS NFR BLD AUTO: 0.8 %
BILIRUB SERPL-MCNC: 0.8 MG/DL (ref 0.1–2)
BUN BLD-MCNC: 8 MG/DL (ref 7–18)
CALCIUM BLD-MCNC: 9.6 MG/DL (ref 8.5–10.1)
CHLORIDE SERPL-SCNC: 107 MMOL/L (ref 98–112)
CHOLEST SMN-MCNC: 252 MG/DL (ref ?–200)
CO2 SERPL-SCNC: 26 MMOL/L (ref 21–32)
CREAT BLD-MCNC: 0.7 MG/DL
EOSINOPHIL # BLD AUTO: 0.18 X10(3) UL (ref 0–0.7)
EOSINOPHIL NFR BLD AUTO: 2.5 %
ERYTHROCYTE [DISTWIDTH] IN BLOOD BY AUTOMATED COUNT: 13.9 %
GLOBULIN PLAS-MCNC: 3.6 G/DL (ref 2.8–4.4)
GLUCOSE BLD-MCNC: 88 MG/DL (ref 70–99)
HCT VFR BLD AUTO: 44.7 %
HDLC SERPL-MCNC: 65 MG/DL (ref 40–59)
HGB BLD-MCNC: 14.5 G/DL
IMM GRANULOCYTES # BLD AUTO: 0.02 X10(3) UL (ref 0–1)
IMM GRANULOCYTES NFR BLD: 0.3 %
LDLC SERPL CALC-MCNC: 167 MG/DL (ref ?–100)
LYMPHOCYTES # BLD AUTO: 1.81 X10(3) UL (ref 1–4)
LYMPHOCYTES NFR BLD AUTO: 25.2 %
M PROTEIN MFR SERPL ELPH: 7.5 G/DL (ref 6.4–8.2)
MCH RBC QN AUTO: 30.8 PG (ref 26–34)
MCHC RBC AUTO-ENTMCNC: 32.4 G/DL (ref 31–37)
MCV RBC AUTO: 94.9 FL
MONOCYTES # BLD AUTO: 0.6 X10(3) UL (ref 0.1–1)
MONOCYTES NFR BLD AUTO: 8.3 %
NEUTROPHILS # BLD AUTO: 4.52 X10 (3) UL (ref 1.5–7.7)
NEUTROPHILS # BLD AUTO: 4.52 X10(3) UL (ref 1.5–7.7)
NEUTROPHILS NFR BLD AUTO: 62.9 %
NONHDLC SERPL-MCNC: 187 MG/DL (ref ?–130)
OSMOLALITY SERPL CALC.SUM OF ELEC: 286 MOSM/KG (ref 275–295)
PATIENT FASTING Y/N/NP: YES
PATIENT FASTING Y/N/NP: YES
PLATELET # BLD AUTO: 300 10(3)UL (ref 150–450)
POTASSIUM SERPL-SCNC: 4.5 MMOL/L (ref 3.5–5.1)
RBC # BLD AUTO: 4.71 X10(6)UL
SODIUM SERPL-SCNC: 139 MMOL/L (ref 136–145)
TRIGL SERPL-MCNC: 113 MG/DL (ref 30–149)
TSI SER-ACNC: 0.96 MIU/ML (ref 0.36–3.74)
VLDLC SERPL CALC-MCNC: 22 MG/DL (ref 0–30)
WBC # BLD AUTO: 7.2 X10(3) UL (ref 4–11)

## 2021-09-10 PROCEDURE — 80061 LIPID PANEL: CPT

## 2021-09-10 PROCEDURE — 36415 COLL VENOUS BLD VENIPUNCTURE: CPT

## 2021-09-10 PROCEDURE — 80053 COMPREHEN METABOLIC PANEL: CPT

## 2021-09-10 PROCEDURE — 84443 ASSAY THYROID STIM HORMONE: CPT

## 2021-09-10 PROCEDURE — 85025 COMPLETE CBC W/AUTO DIFF WBC: CPT

## 2021-09-17 ENCOUNTER — OFFICE VISIT (OUTPATIENT)
Dept: INTERNAL MEDICINE CLINIC | Facility: CLINIC | Age: 71
End: 2021-09-17
Payer: MEDICARE

## 2021-09-17 VITALS
RESPIRATION RATE: 16 BRPM | HEIGHT: 62.6 IN | BODY MASS INDEX: 28.89 KG/M2 | HEART RATE: 89 BPM | DIASTOLIC BLOOD PRESSURE: 88 MMHG | TEMPERATURE: 97 F | SYSTOLIC BLOOD PRESSURE: 139 MMHG | OXYGEN SATURATION: 96 % | WEIGHT: 161 LBS

## 2021-09-17 DIAGNOSIS — E78.00 HIGH CHOLESTEROL: ICD-10-CM

## 2021-09-17 DIAGNOSIS — Z91.09 ENVIRONMENTAL ALLERGIES: ICD-10-CM

## 2021-09-17 DIAGNOSIS — Z12.31 ENCOUNTER FOR SCREENING MAMMOGRAM FOR MALIGNANT NEOPLASM OF BREAST: ICD-10-CM

## 2021-09-17 DIAGNOSIS — K21.9 GASTROESOPHAGEAL REFLUX DISEASE, UNSPECIFIED WHETHER ESOPHAGITIS PRESENT: ICD-10-CM

## 2021-09-17 DIAGNOSIS — M54.41 CHRONIC RIGHT-SIDED LOW BACK PAIN WITH RIGHT-SIDED SCIATICA: ICD-10-CM

## 2021-09-17 DIAGNOSIS — M51.36 DDD (DEGENERATIVE DISC DISEASE), LUMBAR: ICD-10-CM

## 2021-09-17 DIAGNOSIS — Z00.00 ENCOUNTER FOR ANNUAL HEALTH EXAMINATION: Primary | ICD-10-CM

## 2021-09-17 DIAGNOSIS — G89.29 CHRONIC RIGHT-SIDED LOW BACK PAIN WITH RIGHT-SIDED SCIATICA: ICD-10-CM

## 2021-09-17 DIAGNOSIS — I10 BENIGN ESSENTIAL HTN: ICD-10-CM

## 2021-09-17 DIAGNOSIS — K14.6 SORENESS OF TONGUE: ICD-10-CM

## 2021-09-17 DIAGNOSIS — Z85.41 HISTORY OF CERVICAL CANCER: ICD-10-CM

## 2021-09-17 DIAGNOSIS — M85.80 OSTEOPENIA, UNSPECIFIED LOCATION: ICD-10-CM

## 2021-09-17 DIAGNOSIS — M54.12 CERVICAL RADICULOPATHY: ICD-10-CM

## 2021-09-17 PROBLEM — R35.0 URINARY FREQUENCY: Status: RESOLVED | Noted: 2019-12-13 | Resolved: 2021-09-17

## 2021-09-17 PROCEDURE — 99397 PER PM REEVAL EST PAT 65+ YR: CPT | Performed by: INTERNAL MEDICINE

## 2021-09-17 PROCEDURE — 3079F DIAST BP 80-89 MM HG: CPT | Performed by: INTERNAL MEDICINE

## 2021-09-17 PROCEDURE — 3008F BODY MASS INDEX DOCD: CPT | Performed by: INTERNAL MEDICINE

## 2021-09-17 PROCEDURE — 3075F SYST BP GE 130 - 139MM HG: CPT | Performed by: INTERNAL MEDICINE

## 2021-09-17 PROCEDURE — 96160 PT-FOCUSED HLTH RISK ASSMT: CPT | Performed by: INTERNAL MEDICINE

## 2021-09-17 PROCEDURE — G0439 PPPS, SUBSEQ VISIT: HCPCS | Performed by: INTERNAL MEDICINE

## 2021-09-17 RX ORDER — PANTOPRAZOLE SODIUM 40 MG/1
40 TABLET, DELAYED RELEASE ORAL
Qty: 90 TABLET | Refills: 3 | Status: SHIPPED | OUTPATIENT
Start: 2021-09-17

## 2021-09-17 RX ORDER — LOSARTAN POTASSIUM 50 MG/1
50 TABLET ORAL DAILY
Qty: 90 TABLET | Refills: 3 | Status: SHIPPED | OUTPATIENT
Start: 2021-09-17

## 2021-09-17 NOTE — PATIENT INSTRUCTIONS
Libertad Becerra's SCREENING SCHEDULE   Tests on this list are recommended by your physician but may not be covered, or covered at this frequency, by your insurer. Please check with your insurance carrier before scheduling to verify coverage.    PREV DEXA BONE DENSITOMETRY (CPT=77080) 10/16/2020      No recommendations at this time   Pap and Pelvic    Pap   Covered every 2 years for women at normal risk;  Annually if at high risk -  No recommendations at this time    Chlamydia Annually if high risk -  N http://www. idph.state. [place]/public/books/advin.htm  A link to the [employer]. This site has a lot of good information including definitions of the different types of Advance Directives.  It also has the State forms available on it's webs

## 2021-09-17 NOTE — PROGRESS NOTES
HPI:   Caity Stoddard is a 66year old female who presents for a MA (Medicare Advantage) [facility] (Once per calendar year). Encounter for annual health examination- referred for mammogram, she is utd on cscope.  Encouraged covid 19 vaccine, flu  for [facility] on file in [address] Not Discussed       She has never smoked tobacco.    CAGE screening score of 0 on 9/17/2021, showing low risk of alcohol abuse.         Patient Care Team: Patient Care Team:  Mary Mason MD as PCP - [facility] disturbance, Uncomfortable fullness after meals, Wears glasses, and Weight loss. She  has a past surgical history that includes hysterectomy; colonoscopy (11/13/14); colonoscopy; egd; and sigmoidoscopy,diagnostic.     Her family history includes Breast C bilaterally, respirations unlabored   Heart:  Regular rate and rhythm, S1 and S2 normal   Abdomen:   Soft, non-tender, bowel sounds active all four quadrants,  no masses, no organomegaly   Pelvic: Deferred   Extremities: Extremities normal, atraumatic, no up  Gastroesophageal reflux disease - controlled, CPM  Osteopenia, unspecified location- discussed ca and vit d along with weight bearing exercises. dexa due 2022   Hand joint pain- likely OA, otc tylenol prn.  Can see hand ortho if symptoms persist  Diet a TRIG 113 09/10/2021         Electrocardiogram (EKG)   Covered if needed at Welcome to Medicare, and non-screening if indicated for medical reasons 04/05/2018      Ultrasound Screening for Abdominal Aortic Aneurysm (AAA) Covered once in a lifetime for on (cut with metal); may be covered with your pharmacy prescription benefits -    Tetanus, Diptheria and Pertusis TD and TDaP Not covered by Medicare Part B -  No recommendations at this time    Zoster Not covered by Medicare Part B; may be covered with your

## 2021-10-12 ENCOUNTER — TELEPHONE (OUTPATIENT)
Dept: PHYSICAL THERAPY | Facility: HOSPITAL | Age: 71
End: 2021-10-12

## 2021-10-15 NOTE — PROGRESS NOTES
SPINE EVALUATION:   Referring Physician: Dr. Mili Magaña  Diagnosis: Chronic right-sided low back pain with right-sided sciatica (M54.41,G89.29)      Date of Service: 10/15/2021     PATIENT SUMMARY   Randy Paris is a 66year old female who presents functional limitations include walking prolonged, sleeping through the night, lifting carrying groceries, housework    Havery Edu describes prior level of function no significant pain 2 years prior, hx of epioide of sciatic many years back.  Pt goals include achilles and patellar   Clonus absent B    Lumbar AROM: (* denotes performed with pain)  Flexion: 90  Extension: hinges lower lumbar   Sidebending: L buttock pain and to joint line, R 2\" past joint line and non-paiful   Rotation:limited 75%, tightness/pin - 15 reps      Charges: PT Eval Moderate Complexity, TE x 1      Total Timed Treatment: 15 min     Total Treatment Time: 45 min     Based on the clinical presentation, examination and history, this evaluation shows involvement of 3-4 body structures involv care.    X___________________________________________________ Date____________________    Certification From: 85/85/4321  To:1/13/2022

## 2021-10-18 ENCOUNTER — OFFICE VISIT (OUTPATIENT)
Dept: PHYSICAL THERAPY | Facility: HOSPITAL | Age: 71
End: 2021-10-18
Attending: INTERNAL MEDICINE
Payer: MEDICARE

## 2021-10-18 ENCOUNTER — TELEPHONE (OUTPATIENT)
Dept: PHYSICAL THERAPY | Facility: HOSPITAL | Age: 71
End: 2021-10-18

## 2021-10-18 DIAGNOSIS — M54.41 CHRONIC RIGHT-SIDED LOW BACK PAIN WITH RIGHT-SIDED SCIATICA: ICD-10-CM

## 2021-10-18 DIAGNOSIS — G89.29 CHRONIC RIGHT-SIDED LOW BACK PAIN WITH RIGHT-SIDED SCIATICA: ICD-10-CM

## 2021-10-18 PROCEDURE — 97110 THERAPEUTIC EXERCISES: CPT

## 2021-10-18 PROCEDURE — 97162 PT EVAL MOD COMPLEX 30 MIN: CPT

## 2021-10-20 ENCOUNTER — APPOINTMENT (OUTPATIENT)
Dept: PHYSICAL THERAPY | Facility: HOSPITAL | Age: 71
End: 2021-10-20
Attending: INTERNAL MEDICINE
Payer: MEDICARE

## 2021-10-25 ENCOUNTER — APPOINTMENT (OUTPATIENT)
Dept: PHYSICAL THERAPY | Facility: HOSPITAL | Age: 71
End: 2021-10-25
Attending: INTERNAL MEDICINE
Payer: MEDICARE

## 2021-11-03 ENCOUNTER — APPOINTMENT (OUTPATIENT)
Dept: PHYSICAL THERAPY | Facility: HOSPITAL | Age: 71
End: 2021-11-03
Attending: INTERNAL MEDICINE
Payer: MEDICARE

## 2021-11-05 ENCOUNTER — APPOINTMENT (OUTPATIENT)
Dept: PHYSICAL THERAPY | Facility: HOSPITAL | Age: 71
End: 2021-11-05
Attending: INTERNAL MEDICINE
Payer: MEDICARE

## 2021-11-08 ENCOUNTER — APPOINTMENT (OUTPATIENT)
Dept: PHYSICAL THERAPY | Facility: HOSPITAL | Age: 71
End: 2021-11-08
Attending: INTERNAL MEDICINE
Payer: MEDICARE

## 2021-11-15 ENCOUNTER — LAB ENCOUNTER (OUTPATIENT)
Dept: LAB | Age: 71
End: 2021-11-15
Attending: OTOLARYNGOLOGY
Payer: MEDICARE

## 2021-11-15 ENCOUNTER — APPOINTMENT (OUTPATIENT)
Dept: PHYSICAL THERAPY | Facility: HOSPITAL | Age: 71
End: 2021-11-15
Attending: INTERNAL MEDICINE
Payer: MEDICARE

## 2021-11-15 DIAGNOSIS — K21.9 GASTROESOPHAGEAL REFLUX DISEASE WITHOUT ESOPHAGITIS: ICD-10-CM

## 2021-11-15 DIAGNOSIS — K14.6 BURNING TONGUE SYNDROME: ICD-10-CM

## 2021-11-15 PROCEDURE — 36415 COLL VENOUS BLD VENIPUNCTURE: CPT

## 2021-11-15 PROCEDURE — 84630 ASSAY OF ZINC: CPT

## 2021-11-17 ENCOUNTER — APPOINTMENT (OUTPATIENT)
Dept: PHYSICAL THERAPY | Facility: HOSPITAL | Age: 71
End: 2021-11-17
Attending: INTERNAL MEDICINE
Payer: MEDICARE

## 2021-11-19 ENCOUNTER — APPOINTMENT (OUTPATIENT)
Dept: PHYSICAL THERAPY | Facility: HOSPITAL | Age: 71
End: 2021-11-19
Attending: INTERNAL MEDICINE
Payer: MEDICARE

## 2021-12-14 ENCOUNTER — PATIENT MESSAGE (OUTPATIENT)
Dept: INTERNAL MEDICINE CLINIC | Facility: CLINIC | Age: 71
End: 2021-12-14

## 2021-12-27 ENCOUNTER — HOSPITAL ENCOUNTER (OUTPATIENT)
Dept: MAMMOGRAPHY | Age: 71
Discharge: HOME | End: 2021-12-27
Attending: INTERNAL MEDICINE
Payer: MEDICARE

## 2021-12-27 DIAGNOSIS — Z12.31 ENCOUNTER FOR SCREENING MAMMOGRAM FOR MALIGNANT NEOPLASM OF BREAST: ICD-10-CM

## 2021-12-27 PROCEDURE — 77063 BREAST TOMOSYNTHESIS BI: CPT | Performed by: INTERNAL MEDICINE

## 2021-12-27 PROCEDURE — 77067 SCR MAMMO BI INCL CAD: CPT | Performed by: INTERNAL MEDICINE

## 2022-03-01 NOTE — TELEPHONE ENCOUNTER
Pt notified per TB otc medication looks ok as far as she can tell. Pt verbalizes understanding and will call if needed. Vaccine status unknown

## 2022-09-07 RX ORDER — LOSARTAN POTASSIUM 50 MG/1
TABLET ORAL
Qty: 90 TABLET | Refills: 3 | Status: SHIPPED | OUTPATIENT
Start: 2022-09-07

## 2022-09-07 RX ORDER — PANTOPRAZOLE SODIUM 40 MG/1
TABLET, DELAYED RELEASE ORAL
Qty: 90 TABLET | Refills: 3 | Status: SHIPPED | OUTPATIENT
Start: 2022-09-07

## 2022-09-28 ENCOUNTER — OFFICE VISIT (OUTPATIENT)
Dept: INTERNAL MEDICINE CLINIC | Facility: CLINIC | Age: 72
End: 2022-09-28

## 2022-09-28 ENCOUNTER — LAB ENCOUNTER (OUTPATIENT)
Dept: LAB | Age: 72
End: 2022-09-28
Attending: INTERNAL MEDICINE

## 2022-09-28 DIAGNOSIS — E78.00 HIGH CHOLESTEROL: ICD-10-CM

## 2022-09-28 DIAGNOSIS — K21.9 GASTROESOPHAGEAL REFLUX DISEASE, UNSPECIFIED WHETHER ESOPHAGITIS PRESENT: ICD-10-CM

## 2022-09-28 DIAGNOSIS — Z85.41 HISTORY OF CERVICAL CANCER: ICD-10-CM

## 2022-09-28 DIAGNOSIS — M85.859 OSTEOPENIA OF NECK OF FEMUR, UNSPECIFIED LATERALITY: ICD-10-CM

## 2022-09-28 DIAGNOSIS — M54.12 CERVICAL RADICULOPATHY: ICD-10-CM

## 2022-09-28 DIAGNOSIS — Z00.00 ENCOUNTER FOR ANNUAL HEALTH EXAMINATION: Primary | ICD-10-CM

## 2022-09-28 DIAGNOSIS — I10 BENIGN ESSENTIAL HTN: ICD-10-CM

## 2022-09-28 DIAGNOSIS — M51.36 DDD (DEGENERATIVE DISC DISEASE), LUMBAR: ICD-10-CM

## 2022-09-28 DIAGNOSIS — Z91.09 ENVIRONMENTAL ALLERGIES: ICD-10-CM

## 2022-09-28 DIAGNOSIS — Z12.31 ENCOUNTER FOR SCREENING MAMMOGRAM FOR MALIGNANT NEOPLASM OF BREAST: ICD-10-CM

## 2022-09-28 PROBLEM — N36.8 URETHRAL CYST: Status: RESOLVED | Noted: 2019-12-13 | Resolved: 2022-09-28

## 2022-09-28 LAB
ALBUMIN SERPL-MCNC: 4 G/DL (ref 3.4–5)
ALBUMIN/GLOB SERPL: 1.2 {RATIO} (ref 1–2)
ALP LIVER SERPL-CCNC: 75 U/L
ALT SERPL-CCNC: 19 U/L
ANION GAP SERPL CALC-SCNC: 5 MMOL/L (ref 0–18)
AST SERPL-CCNC: 19 U/L (ref 15–37)
BASOPHILS # BLD AUTO: 0.06 X10(3) UL (ref 0–0.2)
BASOPHILS NFR BLD AUTO: 0.9 %
BILIRUB SERPL-MCNC: 0.8 MG/DL (ref 0.1–2)
BUN BLD-MCNC: 13 MG/DL (ref 7–18)
BUN/CREAT SERPL: 16.7 (ref 10–20)
CALCIUM BLD-MCNC: 9.5 MG/DL (ref 8.5–10.1)
CHLORIDE SERPL-SCNC: 108 MMOL/L (ref 98–112)
CHOLEST SERPL-MCNC: 233 MG/DL (ref ?–200)
CO2 SERPL-SCNC: 29 MMOL/L (ref 21–32)
CREAT BLD-MCNC: 0.78 MG/DL
DEPRECATED RDW RBC AUTO: 45.8 FL (ref 35.1–46.3)
EOSINOPHIL # BLD AUTO: 0.16 X10(3) UL (ref 0–0.7)
EOSINOPHIL NFR BLD AUTO: 2.4 %
ERYTHROCYTE [DISTWIDTH] IN BLOOD BY AUTOMATED COUNT: 13.3 % (ref 11–15)
FASTING PATIENT LIPID ANSWER: YES
FASTING STATUS PATIENT QL REPORTED: YES
GFR SERPLBLD BASED ON 1.73 SQ M-ARVRAT: 80 ML/MIN/1.73M2 (ref 60–?)
GLOBULIN PLAS-MCNC: 3.4 G/DL (ref 2.8–4.4)
GLUCOSE BLD-MCNC: 92 MG/DL (ref 70–99)
HCT VFR BLD AUTO: 44.6 %
HDLC SERPL-MCNC: 71 MG/DL (ref 40–59)
HGB BLD-MCNC: 14.7 G/DL
IMM GRANULOCYTES # BLD AUTO: 0.03 X10(3) UL (ref 0–1)
IMM GRANULOCYTES NFR BLD: 0.4 %
LDLC SERPL CALC-MCNC: 145 MG/DL (ref ?–100)
LYMPHOCYTES # BLD AUTO: 1.75 X10(3) UL (ref 1–4)
LYMPHOCYTES NFR BLD AUTO: 25.8 %
MCH RBC QN AUTO: 30.8 PG (ref 26–34)
MCHC RBC AUTO-ENTMCNC: 33 G/DL (ref 31–37)
MCV RBC AUTO: 93.3 FL
MONOCYTES # BLD AUTO: 0.57 X10(3) UL (ref 0.1–1)
MONOCYTES NFR BLD AUTO: 8.4 %
NEUTROPHILS # BLD AUTO: 4.21 X10 (3) UL (ref 1.5–7.7)
NEUTROPHILS # BLD AUTO: 4.21 X10(3) UL (ref 1.5–7.7)
NEUTROPHILS NFR BLD AUTO: 62.1 %
NONHDLC SERPL-MCNC: 162 MG/DL (ref ?–130)
OSMOLALITY SERPL CALC.SUM OF ELEC: 294 MOSM/KG (ref 275–295)
PLATELET # BLD AUTO: 291 10(3)UL (ref 150–450)
POTASSIUM SERPL-SCNC: 4.7 MMOL/L (ref 3.5–5.1)
PROT SERPL-MCNC: 7.4 G/DL (ref 6.4–8.2)
RBC # BLD AUTO: 4.78 X10(6)UL
SODIUM SERPL-SCNC: 142 MMOL/L (ref 136–145)
TRIGL SERPL-MCNC: 97 MG/DL (ref 30–149)
TSI SER-ACNC: 0.59 MIU/ML (ref 0.36–3.74)
VLDLC SERPL CALC-MCNC: 18 MG/DL (ref 0–30)
WBC # BLD AUTO: 6.8 X10(3) UL (ref 4–11)

## 2022-09-28 PROCEDURE — 36415 COLL VENOUS BLD VENIPUNCTURE: CPT

## 2022-09-28 PROCEDURE — 1126F AMNT PAIN NOTED NONE PRSNT: CPT | Performed by: INTERNAL MEDICINE

## 2022-09-28 PROCEDURE — 96160 PT-FOCUSED HLTH RISK ASSMT: CPT | Performed by: INTERNAL MEDICINE

## 2022-09-28 PROCEDURE — 80053 COMPREHEN METABOLIC PANEL: CPT

## 2022-09-28 PROCEDURE — 85025 COMPLETE CBC W/AUTO DIFF WBC: CPT

## 2022-09-28 PROCEDURE — 80061 LIPID PANEL: CPT

## 2022-09-28 PROCEDURE — 84443 ASSAY THYROID STIM HORMONE: CPT

## 2022-09-28 PROCEDURE — 3075F SYST BP GE 130 - 139MM HG: CPT | Performed by: INTERNAL MEDICINE

## 2022-09-28 PROCEDURE — 3008F BODY MASS INDEX DOCD: CPT | Performed by: INTERNAL MEDICINE

## 2022-09-28 PROCEDURE — G0439 PPPS, SUBSEQ VISIT: HCPCS | Performed by: INTERNAL MEDICINE

## 2022-09-28 PROCEDURE — 99397 PER PM REEVAL EST PAT 65+ YR: CPT | Performed by: INTERNAL MEDICINE

## 2022-09-28 PROCEDURE — 3078F DIAST BP <80 MM HG: CPT | Performed by: INTERNAL MEDICINE

## 2022-09-29 ENCOUNTER — TELEPHONE (OUTPATIENT)
Dept: INTERNAL MEDICINE CLINIC | Facility: CLINIC | Age: 72
End: 2022-09-29

## 2022-09-29 NOTE — TELEPHONE ENCOUNTER
Patient was in the office yesterday to see TB. Patient states that on her after visit summary her weight says that she was 150 lbs. Patient states that she saw on the scale 156.2 and she thinks that should be corrected. Patient also stated that TB had mentioned a medication for her raw tongue and then they did not discuss again at the appointment. Patient would like this information sent to her through [address] please.

## 2022-09-30 VITALS
SYSTOLIC BLOOD PRESSURE: 138 MMHG | OXYGEN SATURATION: 96 % | RESPIRATION RATE: 18 BRPM | HEIGHT: 63 IN | DIASTOLIC BLOOD PRESSURE: 66 MMHG | HEART RATE: 86 BPM | WEIGHT: 156.19 LBS | TEMPERATURE: 98 F | BODY MASS INDEX: 27.68 KG/M2

## 2022-09-30 NOTE — TELEPHONE ENCOUNTER
New weight to reflect pt request.  Pt has question regarding name of medication for a burnt tongue. Relayed to Dr. Meehan Point.   Will inform pt later today

## 2022-10-20 NOTE — Clinical Note
I saw your patient, Ms. Becerra.  Please see attached note for my assessment and plans moving forward.  Thank you for involving her care.  Please feel free to call me with any questions at [phone]  [facility]  Detail Level: Zone

## 2022-11-21 ENCOUNTER — TELEPHONE (OUTPATIENT)
Dept: INTERNAL MEDICINE CLINIC | Facility: CLINIC | Age: 72
End: 2022-11-21

## 2022-11-21 DIAGNOSIS — H40.9 GLAUCOMA, UNSPECIFIED GLAUCOMA TYPE, UNSPECIFIED LATERALITY: Primary | ICD-10-CM

## 2022-11-21 NOTE — TELEPHONE ENCOUNTER
Specialty:   Opthalmology  Full Name of Specialist:  Reymundo Congress    Name of the Provider Group:  [facility]  Address:  [address]  Phone number: [phone]  Fax number :  NPI:    (NPI number of the service provider. the nurses need this information for the referral.  Its for service providers only like [employer], [facility], Etc.   We not NOT need physician NPI's)    Date of Appointment:  No appt yet    Reason for the Appointment (be specific with diagnosis code): screened for glaucoma per patients eye MD Jaz Smith    Has the patient seen a provider in our office for stated problem?:  no    Is this request for an out of network referral?  no  (if yes, please have patient contact referring provider and have them fax office visit notes to triage attention):

## 2022-11-21 NOTE — TELEPHONE ENCOUNTER
Left detailed message (ok per hippa) informing her referral has been placed. Advised to call with any further questions.

## 2023-02-18 ENCOUNTER — HOSPITAL ENCOUNTER (OUTPATIENT)
Dept: MAMMOGRAPHY | Age: 73
Discharge: HOME | End: 2023-02-18
Attending: INTERNAL MEDICINE
Payer: MEDICARE

## 2023-02-18 DIAGNOSIS — Z12.31 ENCOUNTER FOR SCREENING MAMMOGRAM FOR MALIGNANT NEOPLASM OF BREAST: ICD-10-CM

## 2023-02-18 PROCEDURE — 77063 BREAST TOMOSYNTHESIS BI: CPT | Performed by: INTERNAL MEDICINE

## 2023-02-18 PROCEDURE — 77067 SCR MAMMO BI INCL CAD: CPT | Performed by: INTERNAL MEDICINE

## 2023-05-08 ENCOUNTER — TELEPHONE (OUTPATIENT)
Dept: INTERNAL MEDICINE CLINIC | Facility: CLINIC | Age: 73
End: 2023-05-08

## 2023-05-08 NOTE — TELEPHONE ENCOUNTER
LMTCB to schedule Supervisit   Letter sent via [employer]   Letter printed and mailed home  [employer] message sent to schedule

## 2023-06-07 ENCOUNTER — TELEPHONE (OUTPATIENT)
Dept: INTERNAL MEDICINE CLINIC | Facility: CLINIC | Age: 73
End: 2023-06-07

## 2023-06-07 DIAGNOSIS — Z12.11 ENCOUNTER FOR SCREENING COLONOSCOPY: ICD-10-CM

## 2023-06-07 DIAGNOSIS — K21.9 GASTROESOPHAGEAL REFLUX DISEASE, UNSPECIFIED WHETHER ESOPHAGITIS PRESENT: Primary | ICD-10-CM

## 2023-06-07 NOTE — TELEPHONE ENCOUNTER
Future Appointments   Date Time Provider Jeovany Fierro   8/31/2023 11:00 AM Guy Atkinson MD [facility]     Patient is scheduled for Supervisit. Please place orders with Jessica Lowry. Patient aware to fast.  No call back required. Patient informed that labs need to be completed no sooner than 2 weeks prior to the appointment.

## 2023-06-13 ENCOUNTER — HOSPITAL ENCOUNTER (OUTPATIENT)
Dept: GENERAL RADIOLOGY | Age: 73
Discharge: HOME | End: 2023-06-13
Attending: INTERNAL MEDICINE
Payer: MEDICARE

## 2023-06-13 ENCOUNTER — OFFICE VISIT (OUTPATIENT)
Dept: INTERNAL MEDICINE CLINIC | Facility: CLINIC | Age: 73
End: 2023-06-13
Payer: MEDICARE

## 2023-06-13 VITALS
RESPIRATION RATE: 16 BRPM | BODY MASS INDEX: 28.64 KG/M2 | SYSTOLIC BLOOD PRESSURE: 126 MMHG | WEIGHT: 155.63 LBS | TEMPERATURE: 98 F | OXYGEN SATURATION: 97 % | HEART RATE: 86 BPM | HEIGHT: 62 IN | DIASTOLIC BLOOD PRESSURE: 80 MMHG

## 2023-06-13 DIAGNOSIS — R07.89 LEFT-SIDED CHEST WALL PAIN: ICD-10-CM

## 2023-06-13 DIAGNOSIS — R92.2 DENSE BREAST TISSUE ON MAMMOGRAM: ICD-10-CM

## 2023-06-13 DIAGNOSIS — R07.89 LEFT-SIDED CHEST WALL PAIN: Primary | ICD-10-CM

## 2023-06-13 DIAGNOSIS — I10 BENIGN ESSENTIAL HTN: ICD-10-CM

## 2023-06-13 DIAGNOSIS — E78.49 OTHER HYPERLIPIDEMIA: ICD-10-CM

## 2023-06-13 PROCEDURE — 3074F SYST BP LT 130 MM HG: CPT | Performed by: INTERNAL MEDICINE

## 2023-06-13 PROCEDURE — 3008F BODY MASS INDEX DOCD: CPT | Performed by: INTERNAL MEDICINE

## 2023-06-13 PROCEDURE — 3079F DIAST BP 80-89 MM HG: CPT | Performed by: INTERNAL MEDICINE

## 2023-06-13 PROCEDURE — 99213 OFFICE O/P EST LOW 20 MIN: CPT | Performed by: INTERNAL MEDICINE

## 2023-06-13 PROCEDURE — 1170F FXNL STATUS ASSESSED: CPT | Performed by: INTERNAL MEDICINE

## 2023-06-13 PROCEDURE — 71101 X-RAY EXAM UNILAT RIBS/CHEST: CPT | Performed by: INTERNAL MEDICINE

## 2023-06-13 PROCEDURE — 1159F MED LIST DOCD IN RCRD: CPT | Performed by: INTERNAL MEDICINE

## 2023-06-13 PROCEDURE — 1160F RVW MEDS BY RX/DR IN RCRD: CPT | Performed by: INTERNAL MEDICINE

## 2023-06-14 ENCOUNTER — TELEPHONE (OUTPATIENT)
Dept: INTERNAL MEDICINE CLINIC | Facility: CLINIC | Age: 73
End: 2023-06-14

## 2023-06-14 PROBLEM — R07.89 LEFT-SIDED CHEST WALL PAIN: Status: ACTIVE | Noted: 2023-06-14

## 2023-06-14 PROBLEM — R92.2 DENSE BREAST TISSUE ON MAMMOGRAM: Status: ACTIVE | Noted: 2023-06-14

## 2023-06-14 PROBLEM — R92.30 DENSE BREAST TISSUE ON MAMMOGRAM: Status: ACTIVE | Noted: 2023-06-14

## 2023-06-19 ENCOUNTER — LAB ENCOUNTER (OUTPATIENT)
Dept: LAB | Age: 73
End: 2023-06-19
Attending: INTERNAL MEDICINE
Payer: MEDICARE

## 2023-06-19 DIAGNOSIS — I10 BENIGN ESSENTIAL HTN: ICD-10-CM

## 2023-06-19 DIAGNOSIS — R07.89 LEFT-SIDED CHEST WALL PAIN: ICD-10-CM

## 2023-06-19 DIAGNOSIS — E78.49 OTHER HYPERLIPIDEMIA: ICD-10-CM

## 2023-06-19 LAB
ALBUMIN SERPL-MCNC: 3.8 G/DL (ref 3.4–5)
ALBUMIN/GLOB SERPL: 1 {RATIO} (ref 1–2)
ALP LIVER SERPL-CCNC: 88 U/L
ALT SERPL-CCNC: 17 U/L
ANION GAP SERPL CALC-SCNC: 8 MMOL/L (ref 0–18)
AST SERPL-CCNC: 18 U/L (ref 15–37)
BASOPHILS # BLD AUTO: 0.06 X10(3) UL (ref 0–0.2)
BASOPHILS NFR BLD AUTO: 0.8 %
BILIRUB SERPL-MCNC: 0.8 MG/DL (ref 0.1–2)
BUN BLD-MCNC: 10 MG/DL (ref 7–18)
CALCIUM BLD-MCNC: 9.2 MG/DL (ref 8.5–10.1)
CHLORIDE SERPL-SCNC: 105 MMOL/L (ref 98–112)
CHOLEST SERPL-MCNC: 229 MG/DL (ref ?–200)
CO2 SERPL-SCNC: 25 MMOL/L (ref 21–32)
CREAT BLD-MCNC: 0.77 MG/DL
EOSINOPHIL # BLD AUTO: 0.17 X10(3) UL (ref 0–0.7)
EOSINOPHIL NFR BLD AUTO: 2.3 %
ERYTHROCYTE [DISTWIDTH] IN BLOOD BY AUTOMATED COUNT: 13.6 %
FASTING PATIENT LIPID ANSWER: YES
FASTING STATUS PATIENT QL REPORTED: YES
GFR SERPLBLD BASED ON 1.73 SQ M-ARVRAT: 81 ML/MIN/1.73M2 (ref 60–?)
GLOBULIN PLAS-MCNC: 3.8 G/DL (ref 2.8–4.4)
GLUCOSE BLD-MCNC: 88 MG/DL (ref 70–99)
HCT VFR BLD AUTO: 45.7 %
HDLC SERPL-MCNC: 68 MG/DL (ref 40–59)
HGB BLD-MCNC: 14.7 G/DL
IMM GRANULOCYTES # BLD AUTO: 0.02 X10(3) UL (ref 0–1)
IMM GRANULOCYTES NFR BLD: 0.3 %
LDLC SERPL CALC-MCNC: 143 MG/DL (ref ?–100)
LYMPHOCYTES # BLD AUTO: 2.08 X10(3) UL (ref 1–4)
LYMPHOCYTES NFR BLD AUTO: 28.3 %
MCH RBC QN AUTO: 30.3 PG (ref 26–34)
MCHC RBC AUTO-ENTMCNC: 32.2 G/DL (ref 31–37)
MCV RBC AUTO: 94.2 FL
MONOCYTES # BLD AUTO: 0.57 X10(3) UL (ref 0.1–1)
MONOCYTES NFR BLD AUTO: 7.8 %
NEUTROPHILS # BLD AUTO: 4.45 X10 (3) UL (ref 1.5–7.7)
NEUTROPHILS # BLD AUTO: 4.45 X10(3) UL (ref 1.5–7.7)
NEUTROPHILS NFR BLD AUTO: 60.5 %
NONHDLC SERPL-MCNC: 161 MG/DL (ref ?–130)
OSMOLALITY SERPL CALC.SUM OF ELEC: 284 MOSM/KG (ref 275–295)
PLATELET # BLD AUTO: 271 10(3)UL (ref 150–450)
POTASSIUM SERPL-SCNC: 4.2 MMOL/L (ref 3.5–5.1)
PROT SERPL-MCNC: 7.6 G/DL (ref 6.4–8.2)
RBC # BLD AUTO: 4.85 X10(6)UL
SODIUM SERPL-SCNC: 138 MMOL/L (ref 136–145)
TRIGL SERPL-MCNC: 105 MG/DL (ref 30–149)
TSI SER-ACNC: 0.86 MIU/ML (ref 0.36–3.74)
VLDLC SERPL CALC-MCNC: 19 MG/DL (ref 0–30)
WBC # BLD AUTO: 7.4 X10(3) UL (ref 4–11)

## 2023-06-19 PROCEDURE — 85025 COMPLETE CBC W/AUTO DIFF WBC: CPT

## 2023-06-19 PROCEDURE — 84443 ASSAY THYROID STIM HORMONE: CPT

## 2023-06-19 PROCEDURE — 80061 LIPID PANEL: CPT

## 2023-06-19 PROCEDURE — 36415 COLL VENOUS BLD VENIPUNCTURE: CPT

## 2023-06-19 PROCEDURE — 80053 COMPREHEN METABOLIC PANEL: CPT

## 2023-08-02 ENCOUNTER — HOSPITAL ENCOUNTER (OUTPATIENT)
Dept: MAMMOGRAPHY | Facility: HOSPITAL | Age: 73
Discharge: HOME | End: 2023-08-02
Attending: INTERNAL MEDICINE
Payer: MEDICARE

## 2023-08-02 DIAGNOSIS — R92.2 DENSE BREAST TISSUE ON MAMMOGRAM: ICD-10-CM

## 2023-08-02 PROCEDURE — 76641 ULTRASOUND BREAST COMPLETE: CPT | Performed by: INTERNAL MEDICINE

## 2023-08-29 ENCOUNTER — TELEPHONE (OUTPATIENT)
Dept: INTERNAL MEDICINE CLINIC | Facility: CLINIC | Age: 73
End: 2023-08-29

## 2023-09-12 RX ORDER — PANTOPRAZOLE SODIUM 40 MG/1
TABLET, DELAYED RELEASE ORAL
Qty: 90 TABLET | Refills: 3 | Status: SHIPPED | OUTPATIENT
Start: 2023-09-12

## 2023-09-12 RX ORDER — LOSARTAN POTASSIUM 50 MG/1
TABLET ORAL
Qty: 90 TABLET | Refills: 3 | Status: SHIPPED | OUTPATIENT
Start: 2023-09-12

## 2023-10-25 PROBLEM — Z80.0 FAMILY HISTORY OF COLON CANCER: Status: ACTIVE | Noted: 2023-10-25

## 2023-10-25 PROBLEM — R13.10 DYSPHAGIA, UNSPECIFIED: Status: ACTIVE | Noted: 2023-10-25

## 2023-10-25 PROBLEM — Z86.010 HISTORY OF ADENOMATOUS POLYP OF COLON: Status: ACTIVE | Noted: 2023-10-25

## 2023-10-25 PROBLEM — Z86.0101 HISTORY OF ADENOMATOUS POLYP OF COLON: Status: ACTIVE | Noted: 2023-10-25

## 2023-10-26 PROCEDURE — 88305 TISSUE EXAM BY PATHOLOGIST: CPT | Performed by: INTERNAL MEDICINE

## 2023-12-06 ENCOUNTER — HOSPITAL ENCOUNTER (OUTPATIENT)
Dept: GENERAL RADIOLOGY | Facility: HOSPITAL | Age: 73
Discharge: HOME | End: 2023-12-06
Attending: INTERNAL MEDICINE
Payer: MEDICARE

## 2023-12-06 DIAGNOSIS — R13.10 DYSPHAGIA, UNSPECIFIED TYPE: ICD-10-CM

## 2023-12-06 PROCEDURE — 74246 X-RAY XM UPR GI TRC 2CNTRST: CPT | Performed by: INTERNAL MEDICINE

## 2024-02-09 ENCOUNTER — TELEPHONE (OUTPATIENT)
Dept: INTERNAL MEDICINE CLINIC | Facility: CLINIC | Age: 74
End: 2024-02-09

## 2024-02-09 DIAGNOSIS — I10 BENIGN ESSENTIAL HTN: ICD-10-CM

## 2024-02-09 DIAGNOSIS — E78.49 OTHER HYPERLIPIDEMIA: ICD-10-CM

## 2024-02-09 DIAGNOSIS — Z00.00 ROUTINE GENERAL MEDICAL EXAMINATION AT A HEALTH CARE FACILITY: Primary | ICD-10-CM

## 2024-02-09 NOTE — TELEPHONE ENCOUNTER
Orders to      [facility]         Pt aware to get labs done no sooner than 2 weeks prior to the appt.  Pt aware to fast.  No call back required.  /  Future Appointments   Date Time Provider Department Center   3/5/2024  9:20 AM Brooke Castro PA-C [facility]

## 2024-02-26 ENCOUNTER — LAB ENCOUNTER (OUTPATIENT)
Dept: LAB | Age: 74
End: 2024-02-26
Attending: PHYSICIAN ASSISTANT
Payer: MEDICARE

## 2024-02-26 DIAGNOSIS — I10 BENIGN ESSENTIAL HTN: ICD-10-CM

## 2024-02-26 DIAGNOSIS — E78.49 OTHER HYPERLIPIDEMIA: ICD-10-CM

## 2024-02-26 DIAGNOSIS — Z00.00 ROUTINE GENERAL MEDICAL EXAMINATION AT A HEALTH CARE FACILITY: ICD-10-CM

## 2024-02-26 LAB
ALBUMIN SERPL-MCNC: 4 G/DL (ref 3.4–5)
ALBUMIN/GLOB SERPL: 1.2 {RATIO} (ref 1–2)
ALP LIVER SERPL-CCNC: 80 U/L
ALT SERPL-CCNC: 11 U/L
ANION GAP SERPL CALC-SCNC: 5 MMOL/L (ref 0–18)
AST SERPL-CCNC: 17 U/L (ref 15–37)
BASOPHILS # BLD AUTO: 0.06 X10(3) UL (ref 0–0.2)
BASOPHILS NFR BLD AUTO: 1 %
BILIRUB SERPL-MCNC: 0.8 MG/DL (ref 0.1–2)
BUN BLD-MCNC: 11 MG/DL (ref 9–23)
CALCIUM BLD-MCNC: 9.3 MG/DL (ref 8.5–10.1)
CHLORIDE SERPL-SCNC: 107 MMOL/L (ref 98–112)
CHOLEST SERPL-MCNC: 251 MG/DL (ref ?–200)
CO2 SERPL-SCNC: 26 MMOL/L (ref 21–32)
CREAT BLD-MCNC: 0.67 MG/DL
EGFRCR SERPLBLD CKD-EPI 2021: 92 ML/MIN/1.73M2 (ref 60–?)
EOSINOPHIL # BLD AUTO: 0.14 X10(3) UL (ref 0–0.7)
EOSINOPHIL NFR BLD AUTO: 2.2 %
ERYTHROCYTE [DISTWIDTH] IN BLOOD BY AUTOMATED COUNT: 13.7 %
FASTING PATIENT LIPID ANSWER: YES
FASTING STATUS PATIENT QL REPORTED: YES
GLOBULIN PLAS-MCNC: 3.3 G/DL (ref 2.8–4.4)
GLUCOSE BLD-MCNC: 100 MG/DL (ref 70–99)
HCT VFR BLD AUTO: 43.9 %
HDLC SERPL-MCNC: 73 MG/DL (ref 40–59)
HGB BLD-MCNC: 14.3 G/DL
IMM GRANULOCYTES # BLD AUTO: 0.01 X10(3) UL (ref 0–1)
IMM GRANULOCYTES NFR BLD: 0.2 %
LDLC SERPL CALC-MCNC: 163 MG/DL (ref ?–100)
LYMPHOCYTES # BLD AUTO: 1.71 X10(3) UL (ref 1–4)
LYMPHOCYTES NFR BLD AUTO: 27.4 %
MCH RBC QN AUTO: 30.4 PG (ref 26–34)
MCHC RBC AUTO-ENTMCNC: 32.6 G/DL (ref 31–37)
MCV RBC AUTO: 93.2 FL
MONOCYTES # BLD AUTO: 0.5 X10(3) UL (ref 0.1–1)
MONOCYTES NFR BLD AUTO: 8 %
NEUTROPHILS # BLD AUTO: 3.83 X10 (3) UL (ref 1.5–7.7)
NEUTROPHILS # BLD AUTO: 3.83 X10(3) UL (ref 1.5–7.7)
NEUTROPHILS NFR BLD AUTO: 61.2 %
NONHDLC SERPL-MCNC: 178 MG/DL (ref ?–130)
OSMOLALITY SERPL CALC.SUM OF ELEC: 285 MOSM/KG (ref 275–295)
PLATELET # BLD AUTO: 286 10(3)UL (ref 150–450)
POTASSIUM SERPL-SCNC: 4.6 MMOL/L (ref 3.5–5.1)
PROT SERPL-MCNC: 7.3 G/DL (ref 6.4–8.2)
RBC # BLD AUTO: 4.71 X10(6)UL
SODIUM SERPL-SCNC: 138 MMOL/L (ref 136–145)
TRIGL SERPL-MCNC: 90 MG/DL (ref 30–149)
TSI SER-ACNC: 1.22 MIU/ML (ref 0.36–3.74)
VLDLC SERPL CALC-MCNC: 17 MG/DL (ref 0–30)
WBC # BLD AUTO: 6.3 X10(3) UL (ref 4–11)

## 2024-02-26 PROCEDURE — 36415 COLL VENOUS BLD VENIPUNCTURE: CPT

## 2024-02-26 PROCEDURE — 85025 COMPLETE CBC W/AUTO DIFF WBC: CPT

## 2024-02-26 PROCEDURE — 84443 ASSAY THYROID STIM HORMONE: CPT

## 2024-02-26 PROCEDURE — 80053 COMPREHEN METABOLIC PANEL: CPT

## 2024-02-26 PROCEDURE — 80061 LIPID PANEL: CPT

## 2024-03-05 ENCOUNTER — OFFICE VISIT (OUTPATIENT)
Dept: INTERNAL MEDICINE CLINIC | Facility: CLINIC | Age: 74
End: 2024-03-05
Payer: MEDICARE

## 2024-03-05 VITALS
WEIGHT: 153.81 LBS | BODY MASS INDEX: 27.25 KG/M2 | RESPIRATION RATE: 18 BRPM | DIASTOLIC BLOOD PRESSURE: 84 MMHG | TEMPERATURE: 97 F | SYSTOLIC BLOOD PRESSURE: 138 MMHG | OXYGEN SATURATION: 96 % | HEART RATE: 80 BPM | HEIGHT: 63 IN

## 2024-03-05 DIAGNOSIS — Z91.09 ENVIRONMENTAL ALLERGIES: ICD-10-CM

## 2024-03-05 DIAGNOSIS — Z00.00 ROUTINE GENERAL MEDICAL EXAMINATION AT A HEALTH CARE FACILITY: Primary | ICD-10-CM

## 2024-03-05 DIAGNOSIS — K44.9 HIATAL HERNIA: ICD-10-CM

## 2024-03-05 DIAGNOSIS — Z78.0 POST-MENOPAUSAL: ICD-10-CM

## 2024-03-05 DIAGNOSIS — R92.30 DENSE BREAST TISSUE ON MAMMOGRAM, UNSPECIFIED TYPE: ICD-10-CM

## 2024-03-05 DIAGNOSIS — M51.36 DDD (DEGENERATIVE DISC DISEASE), LUMBAR: ICD-10-CM

## 2024-03-05 DIAGNOSIS — R13.10 DYSPHAGIA, UNSPECIFIED TYPE: ICD-10-CM

## 2024-03-05 DIAGNOSIS — M85.80 OSTEOPENIA, UNSPECIFIED LOCATION: ICD-10-CM

## 2024-03-05 DIAGNOSIS — Z12.31 ENCOUNTER FOR SCREENING MAMMOGRAM FOR MALIGNANT NEOPLASM OF BREAST: ICD-10-CM

## 2024-03-05 DIAGNOSIS — Z80.0 FAMILY HISTORY OF COLON CANCER: ICD-10-CM

## 2024-03-05 DIAGNOSIS — E78.49 OTHER HYPERLIPIDEMIA: ICD-10-CM

## 2024-03-05 DIAGNOSIS — M54.12 CERVICAL RADICULOPATHY: ICD-10-CM

## 2024-03-05 DIAGNOSIS — Z85.41 HISTORY OF CERVICAL CANCER: ICD-10-CM

## 2024-03-05 DIAGNOSIS — I10 BENIGN ESSENTIAL HTN: ICD-10-CM

## 2024-03-05 DIAGNOSIS — Z86.010 HISTORY OF ADENOMATOUS POLYP OF COLON: ICD-10-CM

## 2024-03-05 DIAGNOSIS — K21.9 GASTROESOPHAGEAL REFLUX DISEASE, UNSPECIFIED WHETHER ESOPHAGITIS PRESENT: ICD-10-CM

## 2024-03-05 DIAGNOSIS — H40.9 GLAUCOMA, UNSPECIFIED GLAUCOMA TYPE, UNSPECIFIED LATERALITY: ICD-10-CM

## 2024-03-05 PROBLEM — R07.89 LEFT-SIDED CHEST WALL PAIN: Status: RESOLVED | Noted: 2023-06-14 | Resolved: 2024-03-05

## 2024-03-05 NOTE — PROGRESS NOTES
REASON FOR VISIT:    Libertad Becerra is a 73 year old female who presents for a MA Supervisit.    Osteopenia.   Last dexa 2020.   Walks 5 days per week, bikes 1 day per week.     HTN.   Currently taking losartan 50 mg daily.   Denies CP, SOB, HA, and LE edema.      Hiatal hernia.   Has appt with thoracic surgeon tomorrow.     Elevated cholesterol. TG and HDL are ok but LDL has increased since last year. Denies family h/o heart disease.       Patient Care Team: Patient Care Team:  Tash Egan MD as PCP - General  Mettu, Anselmo Barbosa MD (GASTROENTEROLOGY)    Patient Active Problem List   Diagnosis    Other hyperlipidemia    Environmental allergies    History of cervical cancer    Benign essential HTN    DDD (degenerative disc disease), lumbar    Cervical radiculopathy    Gastroesophageal reflux disease    Dense breast tissue on mammogram    Dysphagia, unspecified    History of adenomatous polyp of colon    Family history of colon cancer     Current Outpatient Medications   Medication Sig Dispense Refill    LOSARTAN 50 MG Oral Tab TAKE 1 TABLET EVERY DAY 90 tablet 3    PANTOPRAZOLE 40 MG Oral Tab EC TAKE 1 TABLET BEFORE BREAKFAST 90 tablet 3    Cholecalciferol (VITAMIN D) 1000 units Oral Tab Take 1,000 Units by mouth daily.               Latest Ref Rng & Units 2/26/2024     8:43 AM 6/19/2023     8:37 AM 9/28/2022    10:05 AM 9/10/2021     7:36 AM 9/30/2020     8:15 AM 1/17/2020     1:29 PM 8/23/2019     7:45 AM   Glucose and HbA1c   Glucose 70 - 99 mg/dL 100  88  92  88  96  86  93          Latest Ref Rng & Units 2/26/2024     8:43 AM 6/19/2023     8:37 AM 9/28/2022    10:05 AM 9/10/2021     7:36 AM 9/30/2020     8:15 AM 8/23/2019     7:45 AM 10/29/2018     7:43 AM   Cholesterol   Total Cholesterol <200 mg/dL 251  229  233  252  228  212  218    Triglycerides 30 - 149 mg/dL 90  105  97  113  137  70  104    HDL 40 - 59 mg/dL 73  68  71  65  64  65  56    LDL <100 mg/dL 163  143  145  167  137  133  141           Latest Ref Rng & Units 2/26/2024     8:43 AM 6/19/2023     8:37 AM 9/28/2022    10:05 AM 9/10/2021     7:36 AM 9/30/2020     8:15 AM 1/17/2020     1:29 PM 8/23/2019     7:45 AM   BUN and Cr   BUN 9 - 23 mg/dL 11  10  13  8  9  11  13    Creatinine 0.55 - 1.02 mg/dL 0.67  0.77  0.78  0.70  0.72  0.80  0.80          Latest Ref Rng & Units 2/26/2024     8:43 AM 6/19/2023     8:37 AM 9/28/2022    10:05 AM 9/10/2021     7:36 AM 9/30/2020     8:15 AM 8/23/2019     7:45 AM 10/29/2018     7:43 AM   AST and ALT   AST (SGOT) 15 - 37 U/L 17  18  19  19  16  23  18    ALT (SGPT) 13 - 56 U/L 11  17  19  18  15  21  20          Latest Ref Rng & Units 2/26/2024     8:43 AM 6/19/2023     8:37 AM 9/28/2022    10:05 AM 9/10/2021     7:36 AM 9/30/2020     8:15 AM 8/23/2019     7:45 AM 10/29/2018     7:43 AM   TSH and Free T4   TSH 0.358 - 3.740 mIU/mL 1.220  0.864  0.586  0.964  1.160  1.350  0.784         General Health      In the past six months, have you lost more than 10 pounds without trying?: 2 - No  Has your appetite been poor?: No  Type of Diet: Balanced  How does the patient maintain a good energy level?: Daily Walks  How would you describe your daily physical activity?: Light  How would you describe your current health state?: Good  How do you maintain positive mental well-being?: Social Interaction;Visiting Friends;Visiting Family  Have you had any immunizations at another office such as Influenza, Hepatitis B, Tetanus, or Pneumococcal?: No     Functional Ability     Bathing or Showering: Able without help  Toileting: Able without help  Dressing: Able without help  Eating: Able without help  Driving: Able without help  Preparing your meals: Able without help  Managing money/bills: Able without help  Taking medications as prescribed: Able without help  Are you able to afford your medications?: Yes  Hearing Problems?: No     Functional Status     Hearing Problems?: No  Vision Problems? : No  Difficulty walking?:  No  Difficulty dressing or bathing?: No  Problems with daily activities? : No  Memory Problems?: No      Fall/Risk Assessment                 Depression Screening (PHQ-2/PHQ-9): Over the LAST 2 WEEKS   Little interest or pleasure in doing things (over the last two weeks)?: Not at all  Feeling down, depressed, or hopeless (over the last two weeks)?: Not at all  PHQ-2 SCORE: 0        Advance Directives     Do you have a healthcare power of ?: No  Do you have a living will?: No     Cognitive Assessment     What day of the week is this?: Correct  What month is it?: Correct  What year is it?: Correct  Recall \"Ball\": Correct  Recall \"Flag\": Correct  Recall \"Tree\": Correct         PREVENTATIVE SERVICES   INDICATIONS AND SCHEDULE Internal Lab or Procedure   Diabetes Screening     HbgA1C   Annually No results found for: \"A1C\"    Fasting Blood Sugar (FSB)Annually Glucose (mg/dL)   Date Value   02/26/2024 100 (H)   02/03/2009 93     GLUCOSE (mg/dL)   Date Value   08/29/2015 89      Cardiovascular Disease Screening    LDL Annually LDL Cholesterol Calc (mg/dL)   Date Value   09/26/2009 157 (H)     LDL Cholesterol (mg/dL)   Date Value   02/26/2024 163 (H)     LDL-CHOLESTEROL (mg/dL (calc))   Date Value   08/29/2015 139 (H)       EKG - w/ Initial Preventative Physical Exam only, or if medically necessary    Colorectal Cancer Screening     Colonoscopy Screen every 10 years Health Maintenance   Topic Date Due    Colorectal Cancer Screening  10/25/2028       Flex Sigmoidoscopy Screen every 5 years No results found for this or any previous visit.    Fecal Occult Blood Annually No results found for: \"FOB\", \"OCCULTSTOOL\"   Glaucoma Screening     Ophthalmology Visit Annually    Immunizations     Zoster (Not covered by Medicare Part B) No orders found for this or any previous visit.     SPECIFIC DISEASE MONITORING Internal Lab or Procedure     Annual Monitoring of Persistent Medications  (ACE/ARB, Digoxin, Diuretics)         Potassium  Annually Potassium (mmol/L)   Date Value   02/26/2024 4.6   02/03/2009 4.7     POTASSIUM (mmol/L)   Date Value   08/29/2015 4.3       Creatinine  Annually CREATININE (mg/dL)   Date Value   08/29/2015 0.74     Creatinine, Serum (mg/dL)   Date Value   02/03/2009 0.70     Creatinine (mg/dL)   Date Value   02/26/2024 0.67       Digoxin Serum Conc  Annually No results found for: \"DIGOXIN\"          ALLERGIES:     Allergies   Allergen Reactions    Latex HIVES    Morphine Sulfate In Dextrose RASH     States skin turned orange.     MEDICAL INFORMATION:   Past Medical History:   Diagnosis Date    Abdominal hernia     Abdominal pain     Arthritis     Back pain     Belching     Body piercing     CANCER     cervical cancer-partial hysterectomy    cataract s/p surgery     Chest pain     Fatigue     Flatulence/gas pain/belching     Frequent urination     Heartburn     Hemorrhoids     High cholesterol     History of blood transfusion     Indigestion     Leaking of urine     Night sweats     Pain in joints     Problems with swallowing     Shortness of breath     Sleep disturbance     Uncomfortable fullness after meals     Wears glasses     Weight loss       Past Surgical History:   Procedure Laterality Date    COLONOSCOPY  11/13/14    COLONOSCOPY      EGD      HYSTERECTOMY      partial hysterec    SIGMOIDOSCOPY,DIAGNOSTIC        Family History   Problem Relation Age of Onset    Cancer Father     Colon Cancer Father     Cancer Mother     Breast Cancer Mother 70        early to mid 70's    Cancer Paternal Grandmother     Other (heart  problem) Maternal Grandmother      Immunization History      Immunization History  Administered            Date(s) Administered    FLUZONE 6 months and older PFS 0.5 ml (83834)                          10/10/2020      Zoster Vaccine Recombinant Adjuvanted (Shingrix)                          05/29/2020 09/23/2020        SOCIAL HISTORY:   Social History     Socioeconomic History    Marital  status:    Tobacco Use    Smoking status: Never    Smokeless tobacco: Never   Vaping Use    Vaping Use: Never used   Substance and Sexual Activity    Alcohol use: No     Alcohol/week: 0.0 standard drinks of alcohol    Drug use: No    Sexual activity: Never   Other Topics Concern    Caffeine Concern No    Exercise No     Comment: biking    Seat Belt Yes        REVIEW OF SYSTEMS:   GENERAL: feels well otherwise  SKIN: denies any unusual skin lesions  EYES: denies blurred vision or double vision  HEENT: denies nasal congestion, sinus pain or ST  LUNGS: denies shortness of breath with exertion  CARDIOVASCULAR: denies chest pain on exertion  GI: denies abdominal pain, denies heartburn  : denies dysuria, vaginal discharge or itching  MUSCULOSKELETAL: denies back pain  NEURO: denies headaches  PSYCHE: denies depression or anxiety  HEMATOLOGIC: denies hx of anemia  ENDOCRINE: denies thyroid history  ALL/ASTHMA: denies hx of allergy or asthma    EXAM:   /84   Pulse 80   Temp 97.2 °F (36.2 °C) (Temporal)   Resp 18   Ht 5' 3\" (1.6 m)   Wt 153 lb 12.8 oz (69.8 kg)   SpO2 96%   BMI 27.24 kg/m²    >   BP Readings from Last 3 Encounters:   03/05/24 138/84   07/13/23 (!) 159/99   06/13/23 126/80     GENERAL: well developed, well nourished, in no apparent distress   SKIN: no rashes, no suspicious lesions  HEENT: atraumatic, normocephalic, ears and throat are clear                Hearing Assessed via:  hearing intact b/l   EYES: PERRLA, EOMI, conjunctiva are clear    NECK: supple, no adenopathy, no bruits  CHEST: no chest tenderness  BREAST:deferred   LUNGS: clear to auscultation  CARDIO: RRR without murmur  GI: good BS's, no masses, HSM or tenderness  : deferred  RECTAL: deferred  MUSCULOSKELETAL: back is not tender, FROM of the back  EXTREMITIES: no cyanosis, clubbing or edema  NEURO: Oriented times three, cranial nerves are intact, motor and sensory are grossly intact      ASSESSMENT AND OTHER RELEVANT  CHRONIC CONDITIONS:   Libertad Becerra is a 73 year old female who presents for a Medicare Assessment.     PLAN SUMMARY:   Diagnoses and all orders for this visit:    1. Routine general medical examination at a health care facility  Labs reviewed with pt   Pcv20, flu, and covid vaccines refused   Due for mammogram   Due for dexa     2. Benign essential HTN  Controlled  Continue losartan     3. Other hyperlipidemia  Reviewed lifestyle changes   Recheck in 6 months   Will need statin if LDL remains elevated  Also discussed cardiac calcium score - info given   - Lipid Panel [E]; Future    4. Glaucoma, unspecified glaucoma type, unspecified laterality  Per ophtho     5. Gastroesophageal reflux disease, unspecified whether esophagitis present  Stable on pantoprazole   Followed by Dr Beaver     6. DDD (degenerative disc disease), lumbar  Stable   No acute complaints     7. Cervical radiculopathy  Stable   No acute complaints     8. Dysphagia, unspecified type  Followed by GI     9. History of adenomatous polyp of colon  Colonoscopy utd     10. Dense breast tissue on mammogram, unspecified type  Due for mammogram     11. History of cervical cancer  S/p hysterectomy     12. Family history of colon cancer  Colonoscopy utd     13. Environmental allergies  Stable   No acute complaints     14. Osteopenia, unspecified location  Check updated dexa   Calcium 1200 mg daily, vitamin D 2000 IU daily, weight bearing exercise   - XR DEXA BONE DENSITOMETRY (CPT=77080); Future    15. Post-menopausal  Check updated dexa   - XR DEXA BONE DENSITOMETRY (CPT=77080); Future    16. Encounter for screening mammogram for malignant neoplasm of breast  - [facility]A 2D+3D SCREENING BILAT (CPT=77067/87447); Future    17. Hiatal hernia  See thoracic surgery as scheduled   Continue protonix    The patient indicates understanding of these issues and agrees to the plan.  The patient is asked to return in 1 year for AHA  Diet counseling  perfomed  Exercise counseling perfomed    SUGGESTED VACCINATIONS - Influenza, Pneumococcal, Zoster, Tetanus   Influenza: Influenza Vaccine(1) due on 10/01/2023  Pneumonia: Pneumococcal Vaccination(1 of 1 - PCV) Never done

## 2024-03-06 ENCOUNTER — OFFICE VISIT (OUTPATIENT)
Dept: HEMATOLOGY/ONCOLOGY | Facility: HOSPITAL | Age: 74
End: 2024-03-06
Attending: THORACIC SURGERY (CARDIOTHORACIC VASCULAR SURGERY)
Payer: MEDICARE

## 2024-03-06 VITALS
DIASTOLIC BLOOD PRESSURE: 97 MMHG | WEIGHT: 154 LBS | BODY MASS INDEX: 27.29 KG/M2 | RESPIRATION RATE: 16 BRPM | OXYGEN SATURATION: 95 % | HEIGHT: 62.99 IN | TEMPERATURE: 97 F | HEART RATE: 84 BPM | SYSTOLIC BLOOD PRESSURE: 157 MMHG

## 2024-03-06 DIAGNOSIS — K44.9 HIATAL HERNIA: Primary | ICD-10-CM

## 2024-03-06 PROCEDURE — 99211 OFF/OP EST MAY X REQ PHY/QHP: CPT

## 2024-03-06 NOTE — CONSULTS
Thoracic Surgery Consult Note     Name: Libertad Becerra   Age: 73 year old   Sex: female.   MRN: [id number]    Reason for Consultation: hiatal hernia     Consulting Physician: Dr. Beaver     Subjective:     Chief Complaint: \"I have a hiatal hernia\"    History of Present Illness:   Ms. Becerra is a 73 year old female never smoker presenting with hiatal hernia.     Patient has a several year history of reflux and a year history of dysphagia. EGD from 10/25/23 showed a large paraesophageal hernia. Biopsies negative for malignancy. UGI from 12/6/23 showed large type 4 paraesophageal hernia. Patient was referred by Dr. Beaver to discuss surgical repair.     Patient reports improvement in her reflux with daily pantoprazole but she still has to watch what she eats. She avoids spicy food and tries to eat slower. For the past year, she reports about once daily episodes of dysphagia in which food gets caught in her throat. This most often occurs with foods like bread. She reports occasional gas pain. She also reports shortness of breath with going up one flight of stairs. Patient denies any cough, chest pain, fevers, chills, weight loss, vomiting or regurgitation.     PMH includes cervical cancer 1976 s/p partial hysterectomy. No other thoracic or abdominal surgeries. No blood thinners. Patient has a family history of colon and breast cancer.     Review Of Systems:   10 point review of systems was conducted and was negative except for the pertinent positives listed in the above HPI.    Past Medical History:   Past Medical History:   Diagnosis Date    Abdominal hernia     Abdominal pain     Arthritis     Back pain     Belching     Body piercing     CANCER     cervical cancer-partial hysterectomy    cataract s/p surgery     Chest pain     Fatigue     Flatulence/gas pain/belching     Frequent urination     Heartburn     Hemorrhoids     High cholesterol     History of blood transfusion     Indigestion     Leaking of  urine     Night sweats     Pain in joints     Problems with swallowing     Shortness of breath     Sleep disturbance     Uncomfortable fullness after meals     Wears glasses     Weight loss        Past Surgical History:   Past Surgical History:   Procedure Laterality Date    COLONOSCOPY  11/13/14    COLONOSCOPY      EGD      HYSTERECTOMY      partial hysterec    SIGMOIDOSCOPY,DIAGNOSTIC         Social History:   Social History     Socioeconomic History    Marital status:      Spouse name: Not on file    Number of children: Not on file    Years of education: Not on file    Highest education level: Not on file   Occupational History    Not on file   Tobacco Use    Smoking status: Never    Smokeless tobacco: Never   Vaping Use    Vaping Use: Never used   Substance and Sexual Activity    Alcohol use: No     Alcohol/week: 0.0 standard drinks of alcohol    Drug use: No    Sexual activity: Never   Other Topics Concern     Service Not Asked    Blood Transfusions Not Asked    Caffeine Concern No    Occupational Exposure Not Asked    Hobby Hazards Not Asked    Sleep Concern Not Asked    Stress Concern Not Asked    Weight Concern Not Asked    Special Diet Not Asked    Back Care Not Asked    Exercise No     Comment: biking    Bike Helmet Not Asked    Seat Belt Yes    Self-Exams Not Asked   Social History Narrative    Not on file     Social Determinants of Health     Financial Resource Strain: Not on file   Food Insecurity: Not on file   Transportation Needs: Not on file   Physical Activity: Not on file   Stress: Not on file   Social Connections: Not on file   Housing Stability: Not on file       Family History:   Family History   Problem Relation Age of Onset    Cancer Father     Colon Cancer Father     Cancer Mother     Breast Cancer Mother 70        early to mid 70's    Cancer Paternal Grandmother     Other (heart  problem) Maternal Grandmother        Allergies:  Allergies   Allergen Reactions    Latex HIVES     Morphine Sulfate In Dextrose RASH     States skin turned orange.       Medications:   Current Outpatient Medications on File Prior to Visit   Medication Sig Dispense Refill    LOSARTAN 50 MG Oral Tab TAKE 1 TABLET EVERY DAY 90 tablet 3    PANTOPRAZOLE 40 MG Oral Tab EC TAKE 1 TABLET BEFORE BREAKFAST 90 tablet 3    Cholecalciferol (VITAMIN D) 1000 units Oral Tab Take 1,000 Units by mouth daily.         No current facility-administered medications on file prior to visit.     No current facility-administered medications on file as of 3/6/2024.         Objective:      Vital Signs:  Pulse 84   Temp 97.1 °F (36.2 °C) (Temporal)   Resp 16   Ht 1.6 m (5' 2.99\")   Wt 69.9 kg (154 lb)   SpO2 95%   BMI 27.29 kg/m²     Physical Exam:  General: well appearing female in no acute distress  HEENT: Normocephalic, PERRL, EOMI, no scleral icterus  Neck: Supple, trachea midline, no JVD, no masses. Thyroid not grossly enlarged  Nodes: no cervical or supraclavicular lymphadenopathy appreciated  Heart: regular rate and rhythm. No murmurs, rubs or gallops. No lower extremity edema.  Lungs: Normal respiratory effort. Clear to ascultation bilaterally.   Abdomen: Soft, Non-tender, non-distended. No hepatosplenomegaly noted.  Extremities: No clubbing or cyanosis. No lateralizing weakness  Neuro: No gross cranial nerve defects, no loss of sensation  Psych:  oriented to person place and time, normal mood and affect      Labs:   Lab Results   Component Value Date/Time    WBC 6.3 02/26/2024 08:43 AM    HGB 14.3 02/26/2024 08:43 AM    HCT 43.9 02/26/2024 08:43 AM    .0 02/26/2024 08:43 AM    MCV 93.2 02/26/2024 08:43 AM     Lab Results   Component Value Date/Time     02/26/2024 08:43 AM    K 4.6 02/26/2024 08:43 AM     02/26/2024 08:43 AM    CO2 26.0 02/26/2024 08:43 AM    BUN 11 02/26/2024 08:43 AM     (H) 02/26/2024 08:43 AM    CA 9.3 02/26/2024 08:43 AM     No results found for: \"INR\", \"PT\", \"PTT\"  No components  found for: \"TROPI\"  Lab Results   Component Value Date/Time    ALB 4.0 02/26/2024 08:43 AM    TP 7.3 02/26/2024 08:43 AM    ALT 11 (L) 02/26/2024 08:43 AM    AST 17 02/26/2024 08:43 AM         Review of Data:   UGI 12/6/23:   FINDINGS:  Air-contrast esophagram and upper GI was performed.     Deglutition appears within normal limits.  The air pharyngogram also appears within normal limits.  No tracheal aspiration is identified.  Esophageal peristalsis appears within the limits of normal.  No esophageal mass is appreciated.  No esophageal  stricture is seen.     There is a large type 4 paraesophageal hiatal hernia noted.  The GE junction is well above the diaphragm.  The greater curvature is located cephalad and the lesser curvature is caudal.  The stomach returns to the peritoneal cavity through the hiatus at  the level of the distal body/antrum.  There is is not appear to be obstruction to flow of barium into the distal stomach or duodenum.     There is mild mucosal fold thickening seen in the stomach.  Correlate for possibility of gastritis.  A focal mass is not appreciated.  No ulcerations are identified.     There is a large duodenal diverticulum seen in the 2nd portion of the duodenum measuring approximately 3 cm in diameter.  The duodenal bulb and sweep otherwise appear unremarkable.  The duodenal jejunal junction is normally located.                   Impression   CONCLUSION:    1. Large type 4 paraesophageal hiatal hernia noted.  No obstruction.  2. Mild mucosal fold thickening in the stomach could reflect gastritis.  3. 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  4. No esophageal stricture or mass appreciated.       EGD 10/25/23:   FINDINGS  1. Esophagus: Normal.  2. EGJ: Large paraesophageal hernia  3. Stomach: Normal  4. Duodenum: Normal      Assessment/Plan:     Ms. Becerra is a 73 year old female never smoker presenting with a hiatal hernia.     Patient has a several year  history of reflux and a year history of dysphagia. EGD from 10/25/23 showed a large paraesophageal hernia. Biopsies negative for malignancy. UGI from 12/6/23 showed large type 4 paraesophageal hernia. Patient was referred by Dr. Beaver to discuss surgical repair.     Discussed options including surgical repair and fundoplication. Given the large size of the hernia, repair is recommended. Discussed the risks and benefits of surgery. Patient expressed understanding and would like to proceed with surgery. Patient will need a CT chest prior.     -CT chest   -Scheduled for robotic hiatal hernia repair and fundoplication on 4/8/24 at [place] with Dr. Powell.   -Pre op labs and EKG ordered     Domenica Mayorga PA-C  Thoracic Surgery    I have seen and examined that patient and agree with the above assessment and plan.  I have personally reviewed all the pertinent imaging, discussed the case with gastroenterology and with the consulting physician.    Ms. Becerra is a 73 year old female seen today in consult for a large paraesophageal hernia. This was found on work-up for reflux. Her symptoms consist of heartburn, dysphagia, gain pain and shortness of breath.  Despite treatment with a PPI, these symptoms have persisted (except for heartburn) for several years and surgical consult was requested.  I have review Ms. Becerra's fluorsoscopy and endoscopy results.  Based on these results, I feel that she is a good candidate for a robotic reduction of paraesophageal hernia, repair of hiatus and fundoplication. Prior to committing to this I would like for her to also get a CT chest to get a better sense of the anatomy and what is herniating thorugh.  I went over the details of the operation as well as the risks (enteric, liver or nerve injury, dysphagia, failure to improve symptoms and recurrence) as well as the alternatives (continued medical treatment and behavioral modifications, endoscopic management).  Ms. Becerra  understands these risks and wishes to proceed.  We will plan on surgery April 8th at [facility] once her work-up is complete.      Gregorio Powell MD  Thoracic Surgery  Pager: [phone]    I spent 60 minutes on this visit which included: review of tests, independently interpreting results, obtaining history, counseling on additional tests and procedures, communicating with the consulting physician, care coordination and surgery, its risks and alternatives as described in the above assessment and plan.

## 2024-03-08 ENCOUNTER — MED REC SCAN ONLY (OUTPATIENT)
Dept: INTERNAL MEDICINE CLINIC | Facility: CLINIC | Age: 74
End: 2024-03-08

## 2024-03-08 ENCOUNTER — ORDER TRANSCRIPTION (OUTPATIENT)
Dept: ADMINISTRATIVE | Facility: HOSPITAL | Age: 74
End: 2024-03-08

## 2024-03-08 ENCOUNTER — TELEPHONE (OUTPATIENT)
Dept: INTERNAL MEDICINE CLINIC | Facility: CLINIC | Age: 74
End: 2024-03-08

## 2024-03-08 DIAGNOSIS — Z13.6 SCREENING FOR HEART DISEASE: Primary | ICD-10-CM

## 2024-03-08 NOTE — TELEPHONE ENCOUNTER
Pt stated CS told she could call back to get Individual Heart Scan.  Pt looking for this and requesting a call back/ or [employer] messager pt, when order is placed.

## 2024-03-08 NOTE — TELEPHONE ENCOUNTER
Pt returned call. Spoke w/ pt. Notified I did also send a MCM. Explained heart scan test and pricing and how to schedule. Pt verbalizes understanding and is agreeable to plan. Pt is appreciative of call.

## 2024-03-08 NOTE — TELEPHONE ENCOUNTER
LOV 3/5/24 pasted note below    3. Other hyperlipidemia  Reviewed lifestyle changes   Recheck in 6 months   Will need statin if LDL remains elevated  Also discussed cardiac calcium score - info given   - Lipid Panel [E]; Future      Attempted to call pt, line kept ringing. Unable to leave .     Sent pt info via [facility]

## 2024-03-09 ENCOUNTER — HOSPITAL ENCOUNTER (OUTPATIENT)
Dept: MAMMOGRAPHY | Age: 74
Discharge: HOME | End: 2024-03-09
Attending: PHYSICIAN ASSISTANT
Payer: MEDICARE

## 2024-03-09 ENCOUNTER — HOSPITAL ENCOUNTER (OUTPATIENT)
Dept: BONE DENSITY | Age: 74
Discharge: HOME | End: 2024-03-09
Attending: PHYSICIAN ASSISTANT
Payer: MEDICARE

## 2024-03-09 DIAGNOSIS — M85.80 OSTEOPENIA, UNSPECIFIED LOCATION: ICD-10-CM

## 2024-03-09 DIAGNOSIS — Z78.0 POST-MENOPAUSAL: ICD-10-CM

## 2024-03-09 DIAGNOSIS — Z12.31 ENCOUNTER FOR SCREENING MAMMOGRAM FOR MALIGNANT NEOPLASM OF BREAST: ICD-10-CM

## 2024-03-09 PROCEDURE — 77080 DXA BONE DENSITY AXIAL: CPT | Performed by: PHYSICIAN ASSISTANT

## 2024-03-09 PROCEDURE — 77067 SCR MAMMO BI INCL CAD: CPT | Performed by: PHYSICIAN ASSISTANT

## 2024-03-09 PROCEDURE — 77063 BREAST TOMOSYNTHESIS BI: CPT | Performed by: PHYSICIAN ASSISTANT

## 2024-03-10 ENCOUNTER — HOSPITAL ENCOUNTER (OUTPATIENT)
Dept: CT IMAGING | Age: 74
Discharge: HOME | End: 2024-03-10
Attending: INTERNAL MEDICINE

## 2024-03-10 DIAGNOSIS — Z13.9 ENCOUNTER FOR SCREENING: ICD-10-CM

## 2024-03-10 DIAGNOSIS — Z13.6 SCREENING FOR HEART DISEASE: ICD-10-CM

## 2024-03-11 ENCOUNTER — TELEPHONE (OUTPATIENT)
Dept: INTERNAL MEDICINE CLINIC | Facility: CLINIC | Age: 74
End: 2024-03-11

## 2024-03-11 ENCOUNTER — EKG ENCOUNTER (OUTPATIENT)
Dept: LAB | Facility: HOSPITAL | Age: 74
End: 2024-03-11
Attending: STUDENT IN AN ORGANIZED HEALTH CARE EDUCATION/TRAINING PROGRAM
Payer: MEDICARE

## 2024-03-11 DIAGNOSIS — K44.9 HIATAL HERNIA: ICD-10-CM

## 2024-03-11 LAB
ANION GAP SERPL CALC-SCNC: 4 MMOL/L (ref 0–18)
ANTIBODY SCREEN: NEGATIVE
BASOPHILS # BLD AUTO: 0.06 X10(3) UL (ref 0–0.2)
BASOPHILS NFR BLD AUTO: 1 %
BUN BLD-MCNC: 10 MG/DL (ref 9–23)
CALCIUM BLD-MCNC: 9.3 MG/DL (ref 8.5–10.1)
CHLORIDE SERPL-SCNC: 107 MMOL/L (ref 98–112)
CO2 SERPL-SCNC: 27 MMOL/L (ref 21–32)
CREAT BLD-MCNC: 0.78 MG/DL
EGFRCR SERPLBLD CKD-EPI 2021: 80 ML/MIN/1.73M2 (ref 60–?)
EOSINOPHIL # BLD AUTO: 0.16 X10(3) UL (ref 0–0.7)
EOSINOPHIL NFR BLD AUTO: 2.5 %
ERYTHROCYTE [DISTWIDTH] IN BLOOD BY AUTOMATED COUNT: 13.2 %
FASTING STATUS PATIENT QL REPORTED: NO
GLUCOSE BLD-MCNC: 100 MG/DL (ref 70–99)
HCT VFR BLD AUTO: 43.8 %
HGB BLD-MCNC: 14.4 G/DL
IMM GRANULOCYTES # BLD AUTO: 0.01 X10(3) UL (ref 0–1)
IMM GRANULOCYTES NFR BLD: 0.2 %
LYMPHOCYTES # BLD AUTO: 1.67 X10(3) UL (ref 1–4)
LYMPHOCYTES NFR BLD AUTO: 26.5 %
MCH RBC QN AUTO: 30.3 PG (ref 26–34)
MCHC RBC AUTO-ENTMCNC: 32.9 G/DL (ref 31–37)
MCV RBC AUTO: 92 FL
MONOCYTES # BLD AUTO: 0.59 X10(3) UL (ref 0.1–1)
MONOCYTES NFR BLD AUTO: 9.4 %
NEUTROPHILS # BLD AUTO: 3.82 X10 (3) UL (ref 1.5–7.7)
NEUTROPHILS # BLD AUTO: 3.82 X10(3) UL (ref 1.5–7.7)
NEUTROPHILS NFR BLD AUTO: 60.4 %
OSMOLALITY SERPL CALC.SUM OF ELEC: 285 MOSM/KG (ref 275–295)
PLATELET # BLD AUTO: 272 10(3)UL (ref 150–450)
POTASSIUM SERPL-SCNC: 4.1 MMOL/L (ref 3.5–5.1)
RBC # BLD AUTO: 4.76 X10(6)UL
RH BLOOD TYPE: NEGATIVE
SODIUM SERPL-SCNC: 138 MMOL/L (ref 136–145)
WBC # BLD AUTO: 6.3 X10(3) UL (ref 4–11)

## 2024-03-11 PROCEDURE — 86850 RBC ANTIBODY SCREEN: CPT

## 2024-03-11 PROCEDURE — 80048 BASIC METABOLIC PNL TOTAL CA: CPT

## 2024-03-11 PROCEDURE — 86900 BLOOD TYPING SEROLOGIC ABO: CPT

## 2024-03-11 PROCEDURE — 36415 COLL VENOUS BLD VENIPUNCTURE: CPT

## 2024-03-11 PROCEDURE — 86901 BLOOD TYPING SEROLOGIC RH(D): CPT

## 2024-03-11 PROCEDURE — 85025 COMPLETE CBC W/AUTO DIFF WBC: CPT

## 2024-03-12 LAB
ATRIAL RATE: 64 BPM
P AXIS: 34 DEGREES
P-R INTERVAL: 154 MS
Q-T INTERVAL: 416 MS
QRS DURATION: 76 MS
QTC CALCULATION (BEZET): 429 MS
R AXIS: -10 DEGREES
T AXIS: 61 DEGREES
VENTRICULAR RATE: 64 BPM

## 2024-03-22 ENCOUNTER — HOSPITAL ENCOUNTER (OUTPATIENT)
Dept: CT IMAGING | Facility: HOSPITAL | Age: 74
Discharge: HOME | End: 2024-03-22
Attending: STUDENT IN AN ORGANIZED HEALTH CARE EDUCATION/TRAINING PROGRAM
Payer: MEDICARE

## 2024-03-22 DIAGNOSIS — K44.9 HIATAL HERNIA: ICD-10-CM

## 2024-03-22 PROCEDURE — 71260 CT THORAX DX C+: CPT | Performed by: STUDENT IN AN ORGANIZED HEALTH CARE EDUCATION/TRAINING PROGRAM

## 2024-03-22 RX ORDER — IOHEXOL 350 MG/ML
80 INJECTION, SOLUTION INTRAVENOUS
Status: COMPLETED | OUTPATIENT
Start: 2024-03-22 | End: 2024-03-22

## 2024-03-22 RX ADMIN — IOHEXOL 80 ML: 350 INJECTION, SOLUTION INTRAVENOUS at 14:53:00

## 2024-04-07 ENCOUNTER — ANESTHESIA EVENT (OUTPATIENT)
Dept: SURGERY | Facility: HOSPITAL | Age: 74
End: 2024-04-07
Payer: MEDICARE

## 2024-04-08 ENCOUNTER — ANESTHESIA (OUTPATIENT)
Dept: SURGERY | Facility: HOSPITAL | Age: 74
End: 2024-04-08
Payer: MEDICARE

## 2024-04-08 ENCOUNTER — HOSPITAL ENCOUNTER (INPATIENT)
Facility: HOSPITAL | Age: 74
LOS: 1 days | Discharge: HOME | End: 2024-04-09
Attending: THORACIC SURGERY (CARDIOTHORACIC VASCULAR SURGERY) | Admitting: THORACIC SURGERY (CARDIOTHORACIC VASCULAR SURGERY)
Payer: MEDICARE

## 2024-04-08 DIAGNOSIS — K44.9 HIATAL HERNIA: Primary | ICD-10-CM

## 2024-04-08 DIAGNOSIS — G89.18 ACUTE POST-OPERATIVE PAIN: ICD-10-CM

## 2024-04-08 PROBLEM — I10 PRIMARY HYPERTENSION: Status: ACTIVE | Noted: 2024-04-08

## 2024-04-08 PROBLEM — E78.5 HYPERLIPIDEMIA: Status: ACTIVE | Noted: 2024-04-08

## 2024-04-08 PROCEDURE — 76942 ECHO GUIDE FOR BIOPSY: CPT | Performed by: STUDENT IN AN ORGANIZED HEALTH CARE EDUCATION/TRAINING PROGRAM

## 2024-04-08 PROCEDURE — 8E0W4CZ ROBOTIC ASSISTED PROCEDURE OF TRUNK REGION, PERCUTANEOUS ENDOSCOPIC APPROACH: ICD-10-PCS | Performed by: THORACIC SURGERY (CARDIOTHORACIC VASCULAR SURGERY)

## 2024-04-08 PROCEDURE — 99222 1ST HOSP IP/OBS MODERATE 55: CPT | Performed by: INTERNAL MEDICINE

## 2024-04-08 PROCEDURE — 3E0T3BZ INTRODUCTION OF ANESTHETIC AGENT INTO PERIPHERAL NERVES AND PLEXI, PERCUTANEOUS APPROACH: ICD-10-PCS | Performed by: THORACIC SURGERY (CARDIOTHORACIC VASCULAR SURGERY)

## 2024-04-08 PROCEDURE — 0DV44ZZ RESTRICTION OF ESOPHAGOGASTRIC JUNCTION, PERCUTANEOUS ENDOSCOPIC APPROACH: ICD-10-PCS | Performed by: THORACIC SURGERY (CARDIOTHORACIC VASCULAR SURGERY)

## 2024-04-08 PROCEDURE — 0BQT4ZZ REPAIR DIAPHRAGM, PERCUTANEOUS ENDOSCOPIC APPROACH: ICD-10-PCS | Performed by: THORACIC SURGERY (CARDIOTHORACIC VASCULAR SURGERY)

## 2024-04-08 RX ORDER — ONDANSETRON 2 MG/ML
4 INJECTION INTRAMUSCULAR; INTRAVENOUS EVERY 6 HOURS PRN
Status: DISCONTINUED | OUTPATIENT
Start: 2024-04-08 | End: 2024-04-09

## 2024-04-08 RX ORDER — ONDANSETRON 2 MG/ML
INJECTION INTRAMUSCULAR; INTRAVENOUS AS NEEDED
Status: DISCONTINUED | OUTPATIENT
Start: 2024-04-08 | End: 2024-04-08 | Stop reason: SURG

## 2024-04-08 RX ORDER — METOCLOPRAMIDE HYDROCHLORIDE 5 MG/ML
INJECTION INTRAMUSCULAR; INTRAVENOUS AS NEEDED
Status: DISCONTINUED | OUTPATIENT
Start: 2024-04-08 | End: 2024-04-08 | Stop reason: SURG

## 2024-04-08 RX ORDER — MIDAZOLAM HYDROCHLORIDE 1 MG/ML
1 INJECTION INTRAMUSCULAR; INTRAVENOUS EVERY 5 MIN PRN
Status: DISCONTINUED | OUTPATIENT
Start: 2024-04-08 | End: 2024-04-08 | Stop reason: HOSPADM

## 2024-04-08 RX ORDER — KETOROLAC TROMETHAMINE 30 MG/ML
INJECTION, SOLUTION INTRAMUSCULAR; INTRAVENOUS AS NEEDED
Status: DISCONTINUED | OUTPATIENT
Start: 2024-04-08 | End: 2024-04-08 | Stop reason: SURG

## 2024-04-08 RX ORDER — ONDANSETRON 2 MG/ML
4 INJECTION INTRAMUSCULAR; INTRAVENOUS EVERY 6 HOURS PRN
Status: DISCONTINUED | OUTPATIENT
Start: 2024-04-08 | End: 2024-04-08 | Stop reason: HOSPADM

## 2024-04-08 RX ORDER — METOCLOPRAMIDE HYDROCHLORIDE 5 MG/ML
10 INJECTION INTRAMUSCULAR; INTRAVENOUS EVERY 8 HOURS PRN
Status: DISCONTINUED | OUTPATIENT
Start: 2024-04-08 | End: 2024-04-08 | Stop reason: HOSPADM

## 2024-04-08 RX ORDER — ACETAMINOPHEN 500 MG
1000 TABLET ORAL ONCE AS NEEDED
Status: DISCONTINUED | OUTPATIENT
Start: 2024-04-08 | End: 2024-04-08 | Stop reason: HOSPADM

## 2024-04-08 RX ORDER — CEFAZOLIN SODIUM/WATER 2 G/20 ML
2 SYRINGE (ML) INTRAVENOUS EVERY 8 HOURS
Status: COMPLETED | OUTPATIENT
Start: 2024-04-08 | End: 2024-04-09

## 2024-04-08 RX ORDER — MIDAZOLAM HYDROCHLORIDE 1 MG/ML
INJECTION INTRAMUSCULAR; INTRAVENOUS AS NEEDED
Status: DISCONTINUED | OUTPATIENT
Start: 2024-04-08 | End: 2024-04-08 | Stop reason: SURG

## 2024-04-08 RX ORDER — KETOROLAC TROMETHAMINE 15 MG/ML
15 INJECTION, SOLUTION INTRAMUSCULAR; INTRAVENOUS EVERY 6 HOURS
Status: DISCONTINUED | OUTPATIENT
Start: 2024-04-08 | End: 2024-04-09

## 2024-04-08 RX ORDER — HYDROMORPHONE HYDROCHLORIDE 1 MG/ML
0.4 INJECTION, SOLUTION INTRAMUSCULAR; INTRAVENOUS; SUBCUTANEOUS EVERY 2 HOUR PRN
Status: DISCONTINUED | OUTPATIENT
Start: 2024-04-08 | End: 2024-04-09

## 2024-04-08 RX ORDER — SODIUM CHLORIDE, SODIUM LACTATE, POTASSIUM CHLORIDE, CALCIUM CHLORIDE 600; 310; 30; 20 MG/100ML; MG/100ML; MG/100ML; MG/100ML
INJECTION, SOLUTION INTRAVENOUS CONTINUOUS
Status: DISCONTINUED | OUTPATIENT
Start: 2024-04-08 | End: 2024-04-09

## 2024-04-08 RX ORDER — SODIUM CHLORIDE 9 MG/ML
INJECTION, SOLUTION INTRAVENOUS CONTINUOUS
Status: DISCONTINUED | OUTPATIENT
Start: 2024-04-08 | End: 2024-04-09

## 2024-04-08 RX ORDER — LIDOCAINE HYDROCHLORIDE 10 MG/ML
INJECTION, SOLUTION EPIDURAL; INFILTRATION; INTRACAUDAL; PERINEURAL AS NEEDED
Status: DISCONTINUED | OUTPATIENT
Start: 2024-04-08 | End: 2024-04-08 | Stop reason: SURG

## 2024-04-08 RX ORDER — CEFAZOLIN SODIUM/WATER 2 G/20 ML
2 SYRINGE (ML) INTRAVENOUS ONCE
Status: COMPLETED | OUTPATIENT
Start: 2024-04-08 | End: 2024-04-08

## 2024-04-08 RX ORDER — ACETAMINOPHEN 500 MG
1000 TABLET ORAL ONCE
Status: DISCONTINUED | OUTPATIENT
Start: 2024-04-08 | End: 2024-04-08 | Stop reason: HOSPADM

## 2024-04-08 RX ORDER — HYDROCODONE BITARTRATE AND ACETAMINOPHEN 5; 325 MG/1; MG/1
1 TABLET ORAL ONCE AS NEEDED
Status: DISCONTINUED | OUTPATIENT
Start: 2024-04-08 | End: 2024-04-08 | Stop reason: HOSPADM

## 2024-04-08 RX ORDER — NALOXONE HYDROCHLORIDE 0.4 MG/ML
80 INJECTION, SOLUTION INTRAMUSCULAR; INTRAVENOUS; SUBCUTANEOUS AS NEEDED
Status: DISCONTINUED | OUTPATIENT
Start: 2024-04-08 | End: 2024-04-08 | Stop reason: HOSPADM

## 2024-04-08 RX ORDER — DEXAMETHASONE SODIUM PHOSPHATE 4 MG/ML
VIAL (ML) INJECTION AS NEEDED
Status: DISCONTINUED | OUTPATIENT
Start: 2024-04-08 | End: 2024-04-08 | Stop reason: SURG

## 2024-04-08 RX ORDER — HEPARIN SODIUM 5000 [USP'U]/ML
5000 INJECTION, SOLUTION INTRAVENOUS; SUBCUTANEOUS ONCE
Status: COMPLETED | OUTPATIENT
Start: 2024-04-08 | End: 2024-04-08

## 2024-04-08 RX ORDER — ROCURONIUM BROMIDE 10 MG/ML
INJECTION, SOLUTION INTRAVENOUS AS NEEDED
Status: DISCONTINUED | OUTPATIENT
Start: 2024-04-08 | End: 2024-04-08 | Stop reason: SURG

## 2024-04-08 RX ORDER — PHENYLEPHRINE HCL 10 MG/ML
VIAL (ML) INJECTION AS NEEDED
Status: DISCONTINUED | OUTPATIENT
Start: 2024-04-08 | End: 2024-04-08 | Stop reason: SURG

## 2024-04-08 RX ORDER — HYDROMORPHONE HYDROCHLORIDE 1 MG/ML
0.4 INJECTION, SOLUTION INTRAMUSCULAR; INTRAVENOUS; SUBCUTANEOUS EVERY 5 MIN PRN
Status: DISCONTINUED | OUTPATIENT
Start: 2024-04-08 | End: 2024-04-08 | Stop reason: HOSPADM

## 2024-04-08 RX ORDER — HYDROCODONE BITARTRATE AND ACETAMINOPHEN 5; 325 MG/1; MG/1
2 TABLET ORAL ONCE AS NEEDED
Status: DISCONTINUED | OUTPATIENT
Start: 2024-04-08 | End: 2024-04-08 | Stop reason: HOSPADM

## 2024-04-08 RX ORDER — HYDROMORPHONE HYDROCHLORIDE 1 MG/ML
0.6 INJECTION, SOLUTION INTRAMUSCULAR; INTRAVENOUS; SUBCUTANEOUS EVERY 5 MIN PRN
Status: DISCONTINUED | OUTPATIENT
Start: 2024-04-08 | End: 2024-04-08 | Stop reason: HOSPADM

## 2024-04-08 RX ORDER — ALBUTEROL SULFATE 2.5 MG/3ML
2.5 SOLUTION RESPIRATORY (INHALATION) AS NEEDED
Status: DISCONTINUED | OUTPATIENT
Start: 2024-04-08 | End: 2024-04-08 | Stop reason: HOSPADM

## 2024-04-08 RX ORDER — METOCLOPRAMIDE HYDROCHLORIDE 5 MG/ML
10 INJECTION INTRAMUSCULAR; INTRAVENOUS EVERY 8 HOURS PRN
Status: DISCONTINUED | OUTPATIENT
Start: 2024-04-08 | End: 2024-04-09

## 2024-04-08 RX ORDER — SODIUM CHLORIDE, SODIUM LACTATE, POTASSIUM CHLORIDE, CALCIUM CHLORIDE 600; 310; 30; 20 MG/100ML; MG/100ML; MG/100ML; MG/100ML
INJECTION, SOLUTION INTRAVENOUS CONTINUOUS
Status: DISCONTINUED | OUTPATIENT
Start: 2024-04-08 | End: 2024-04-08 | Stop reason: HOSPADM

## 2024-04-08 RX ORDER — BUPIVACAINE HYDROCHLORIDE 2.5 MG/ML
INJECTION, SOLUTION EPIDURAL; INFILTRATION; INTRACAUDAL AS NEEDED
Status: DISCONTINUED | OUTPATIENT
Start: 2024-04-08 | End: 2024-04-08 | Stop reason: HOSPADM

## 2024-04-08 RX ORDER — LABETALOL HYDROCHLORIDE 5 MG/ML
5 INJECTION, SOLUTION INTRAVENOUS EVERY 5 MIN PRN
Status: DISCONTINUED | OUTPATIENT
Start: 2024-04-08 | End: 2024-04-08 | Stop reason: HOSPADM

## 2024-04-08 RX ORDER — HYDROMORPHONE HYDROCHLORIDE 1 MG/ML
0.2 INJECTION, SOLUTION INTRAMUSCULAR; INTRAVENOUS; SUBCUTANEOUS EVERY 5 MIN PRN
Status: DISCONTINUED | OUTPATIENT
Start: 2024-04-08 | End: 2024-04-08 | Stop reason: HOSPADM

## 2024-04-08 RX ORDER — BISACODYL 10 MG
10 SUPPOSITORY, RECTAL RECTAL
Status: DISCONTINUED | OUTPATIENT
Start: 2024-04-08 | End: 2024-04-09

## 2024-04-08 RX ORDER — ENEMA 19; 7 G/133ML; G/133ML
1 ENEMA RECTAL ONCE AS NEEDED
Status: DISCONTINUED | OUTPATIENT
Start: 2024-04-08 | End: 2024-04-09

## 2024-04-08 RX ORDER — ACETAMINOPHEN 10 MG/ML
1000 INJECTION, SOLUTION INTRAVENOUS EVERY 6 HOURS
Status: DISCONTINUED | OUTPATIENT
Start: 2024-04-08 | End: 2024-04-09

## 2024-04-08 RX ORDER — HYDROMORPHONE HYDROCHLORIDE 1 MG/ML
0.8 INJECTION, SOLUTION INTRAMUSCULAR; INTRAVENOUS; SUBCUTANEOUS EVERY 2 HOUR PRN
Status: DISCONTINUED | OUTPATIENT
Start: 2024-04-08 | End: 2024-04-09

## 2024-04-08 RX ORDER — HEPARIN SODIUM 5000 [USP'U]/ML
5000 INJECTION, SOLUTION INTRAVENOUS; SUBCUTANEOUS EVERY 12 HOURS SCHEDULED
Status: DISCONTINUED | OUTPATIENT
Start: 2024-04-08 | End: 2024-04-09

## 2024-04-08 RX ADMIN — PHENYLEPHRINE HCL 100 MCG: 10 MG/ML VIAL (ML) INJECTION at 07:35:00

## 2024-04-08 RX ADMIN — ONDANSETRON 4 MG: 2 INJECTION INTRAMUSCULAR; INTRAVENOUS at 10:15:00

## 2024-04-08 RX ADMIN — MIDAZOLAM HYDROCHLORIDE 1 MG: 1 INJECTION INTRAMUSCULAR; INTRAVENOUS at 07:30:00

## 2024-04-08 RX ADMIN — ROCURONIUM BROMIDE 10 MG: 10 INJECTION, SOLUTION INTRAVENOUS at 10:00:00

## 2024-04-08 RX ADMIN — ROCURONIUM BROMIDE 10 MG: 10 INJECTION, SOLUTION INTRAVENOUS at 09:00:00

## 2024-04-08 RX ADMIN — ROCURONIUM BROMIDE 10 MG: 10 INJECTION, SOLUTION INTRAVENOUS at 07:35:00

## 2024-04-08 RX ADMIN — LIDOCAINE HYDROCHLORIDE 50 MG: 10 INJECTION, SOLUTION EPIDURAL; INFILTRATION; INTRACAUDAL; PERINEURAL at 07:35:00

## 2024-04-08 RX ADMIN — METOCLOPRAMIDE HYDROCHLORIDE 10 MG: 5 INJECTION INTRAMUSCULAR; INTRAVENOUS at 07:40:00

## 2024-04-08 RX ADMIN — ROCURONIUM BROMIDE 10 MG: 10 INJECTION, SOLUTION INTRAVENOUS at 08:00:00

## 2024-04-08 RX ADMIN — MIDAZOLAM HYDROCHLORIDE 1 MG: 1 INJECTION INTRAMUSCULAR; INTRAVENOUS at 07:25:00

## 2024-04-08 RX ADMIN — SODIUM CHLORIDE, SODIUM LACTATE, POTASSIUM CHLORIDE, CALCIUM CHLORIDE: 600; 310; 30; 20 INJECTION, SOLUTION INTRAVENOUS at 07:25:00

## 2024-04-08 RX ADMIN — PHENYLEPHRINE HCL 100 MCG: 10 MG/ML VIAL (ML) INJECTION at 07:45:00

## 2024-04-08 RX ADMIN — PHENYLEPHRINE HCL 100 MCG: 10 MG/ML VIAL (ML) INJECTION at 07:55:00

## 2024-04-08 RX ADMIN — DEXAMETHASONE SODIUM PHOSPHATE 4 MG: 4 MG/ML VIAL (ML) INJECTION at 07:40:00

## 2024-04-08 RX ADMIN — ROCURONIUM BROMIDE 30 MG: 10 INJECTION, SOLUTION INTRAVENOUS at 07:45:00

## 2024-04-08 RX ADMIN — CEFAZOLIN SODIUM/WATER 2 G: 2 G/20 ML SYRINGE (ML) INTRAVENOUS at 07:45:00

## 2024-04-08 RX ADMIN — KETOROLAC TROMETHAMINE 15 MG: 30 INJECTION, SOLUTION INTRAMUSCULAR; INTRAVENOUS at 10:15:00

## 2024-04-08 NOTE — BRIEF OP NOTE
Pre-Operative Diagnosis: HIATEL HERNIA     Post-Operative Diagnosis: HIATEL HERNIA      Procedure Performed:   ROBOTIC ASSISTED HIATAL HERNIA REPAIR AND  FUNDOPLICATION    Surgeon(s) and Role:     * Gregorio Powell Jr., MD - Primary    Assistant(s):  PA: Domenica Mayorga PA-C     Surgical Findings: large hiatal hernia      Specimen:   ID Type Source Tests Collected by Time Destination   1 : hernia sac Tissue Hernia SURGICAL PATHOLOGY TISSUE Gregorio Powell Jr., MD 4/8/2024 0920         Estimated Blood Loss: Blood Output: 20 mL (4/8/2024 10:03 AM)      Disposition: PACU to floor     Domenica Mayorga PA-C  4/8/2024  10:34 AM

## 2024-04-08 NOTE — ANESTHESIA PROCEDURE NOTES
Airway  Date/Time: 4/8/2024 7:36 AM  Urgency: elective    Airway not difficult    General Information and Staff    Patient location during procedure: OR  Anesthesiologist: Javier Hines MD  Performed: anesthesiologist   Performed by: Javier Hines MD  Authorized by: Javier Hines MD      Indications and Patient Condition  Indications for airway management: anesthesia  Sedation level: deep  Preoxygenated: yes  Patient position: sniffing  Mask difficulty assessment: 0 - not attempted    Final Airway Details  Final airway type: endotracheal airway      Successful airway: ETT  Cuffed: yes   Successful intubation technique: direct laryngoscopy  Facilitating devices/methods: intubating stylet  Endotracheal tube insertion site: oral  Blade: Concetta  Blade size: #3  ETT size (mm): 7.0    Cormack-Lehane Classification: grade I - full view of glottis  Placement verified by: capnometry   Measured from: lips  ETT to lips (cm): 21  Number of attempts at approach: 1  Number of other approaches attempted: 0

## 2024-04-08 NOTE — CONSULTS
[facility]  Report of Consultation    iLbertad Becerra Patient Status:  Inpatient    1950 MRN [id number]   Location [facility] 8NE-A Attending Gregorio Powell Jr., MD   Hosp Day # 0 PCP Tash Egan MD     Reason for Consultation:  Medical management    REFERRING PHYSICIAN: Dr. Andre Jimenez, Gregorio CHAUHAN MD    Chief Complaint: Status post robot-assisted reduction of herniated stomach and closure of esophageal hiatus and Tal fundoplication by CT surgery Dr. Gregorio Powell    History of Present Illness:  Libertad Becerra is a  73 year old female admitted Status post robot-assisted reduction of herniated stomach and closure of esophageal hiatus and Tal fundoplication by CT surgery Dr. Gregorio Powell.  We are consulted for medical management of chronic medical problems including GERD, hyperlipidemia, hypertension patient currently controlled on medications.  Patient complains of expected incisional pain controlled with current pain medication.  Denies any abdominal pain.  No nausea vomiting.  Denies any focal weakness or numbness.      History:  Past Medical History:   Diagnosis Date    Abdominal hernia     Abdominal pain     Arthritis     Back pain     Back problem     Belching     Body piercing     CANCER     cervical cancer-partial hysterectomy    cataract s/p surgery     Chest pain     Fatigue     Flatulence/gas pain/belching     Frequent urination     Heartburn     Hemorrhoids     High blood pressure     High cholesterol     History of blood transfusion     FEW YEARS AGO    Indigestion     Leaking of urine     Night sweats     Pain in joints     Problems with swallowing     Shortness of breath     Sleep disturbance     Uncomfortable fullness after meals     Wears glasses     Weight loss      Past Surgical History:   Procedure Laterality Date    CATARACT      COLONOSCOPY  2014    COLONOSCOPY      EGD      HYSTERECTOMY      partial hysterec    SIGMOIDOSCOPY,DIAGNOSTIC       Family  History   Problem Relation Age of Onset    Cancer Father     Colon Cancer Father     Cancer Mother     Breast Cancer Mother 70        early to mid 70's    Cancer Paternal Grandmother     Other (heart  problem) Maternal Grandmother      Social History:   reports that she has never smoked. She has never used smokeless tobacco. She reports that she does not drink alcohol and does not use drugs.    Allergies:  Allergies   Allergen Reactions    Latex HIVES    Morphine Sulfate In Dextrose RASH     States skin turned orange.    Latex ITCHING       Medications:    Current Facility-Administered Medications:     lactated ringers infusion, , Intravenous, Continuous    sodium chloride 0.9% infusion, , Intravenous, Continuous    acetaminophen (Ofirmev) 10 mg/mL infusion premix 1,000 mg, 1,000 mg, Intravenous, Q6H    HYDROmorphone (Dilaudid) 1 MG/ML injection 0.4 mg, 0.4 mg, Intravenous, Q2H PRN **OR** HYDROmorphone (Dilaudid) 1 MG/ML injection 0.8 mg, 0.8 mg, Intravenous, Q2H PRN    bisacodyl (Dulcolax) 10 MG rectal suppository 10 mg, 10 mg, Rectal, Daily PRN    fleet enema (Fleet) 7-19 GM/118ML rectal enema 133 mL, 1 enema, Rectal, Once PRN    ondansetron (Zofran) 4 MG/2ML injection 4 mg, 4 mg, Intravenous, Q6H PRN    metoclopramide (Reglan) 5 mg/mL injection 10 mg, 10 mg, Intravenous, Q8H PRN    ceFAZolin (Ancef) 2 g in 20mL IV syringe premix, 2 g, Intravenous, Q8H    heparin (Porcine) 5000 UNIT/ML injection 5,000 Units, 5,000 Units, Subcutaneous, 2 times per day    ketorolac (Toradol) 15 MG/ML injection 15 mg, 15 mg, Intravenous, Q6H    [START ON 4/9/2024] pantoprazole (Protonix) 40 mg in sodium chloride 0.9% PF 10 mL IV push, 40 mg, Intravenous, QAM AC    Review of Systems:  A comprehensive 14 point review of systems was completed.  Pertinent positives and negatives noted in the the HPI.    Physical Exam:  Vital signs: Blood pressure 130/66, pulse 81, temperature 97.9 °F (36.6 °C), temperature source Oral, resp. rate 20,  height 5' 2.5\" (1.588 m), weight 152 lb 9.6 oz (69.2 kg), SpO2 93%, not currently breastfeeding.  General: No acute distress. Alert and oriented x 3.  HEENT: Moist mucous membranes. EOM-I. PERRL  Neck: No lymphadenopathy.  No JVD. No carotid bruits.  Respiratory: Clear to auscultation bilaterally.  No wheezes. No rhonchi.  Cardiovascular: S1, S2.  Regular rate and rhythm.  No murmurs. Equal pulses   Abdomen: Soft, nontender, nondistended.  Positive bowel sounds. No rebound tenderness  Neurologic: No focal neurological deficits.  Musculoskeletal: Full range of motion of all extremities.  No swelling noted.  Integument: No lesions. No erythema.  Psychiatric: Appropriate mood and affect.    Laboratory Data:     CBC and BMP ordered for morning    ASSESSMENT / PLAN:   Status post robot-assisted reduction of herniated stomach and closure of esophageal hiatus and Tal fundoplication by CT surgery Dr. Gregorio Powell-managed by CT surgeon    Hypertension  Will hold home Diovan   Follow blood pressure  IV hydralazine as needed systolic blood pressure more than 160 after pain control  GERD  On IV PPI  Hyperlipidemia  Not on any home medication for this  Follow-up with regular outpatient primary care physician regarding follow-up of this      Quality:  DVT Prophylaxis: SCD, subcutaneous heparin  CODE status: full  Craig: no    Plan of care discussed with patient, RN        Thank you for allowing me to participate in the care of your patient. Will continue to follow while in hospital.     Ada Noriega MD  4/8/2024  5:50 PM

## 2024-04-08 NOTE — ANESTHESIA PREPROCEDURE EVALUATION
PRE-OP EVALUATION    Patient Name: Libertad Becerra    Admit Diagnosis: HIATEL HERNIA    Pre-op Diagnosis: HIATEL HERNIA    ESOPHAGOGASTROSCOPY, ROBOTIC ASSISTED HIATAL HERNIA REPAIR AND  FUNDOPLICATION    Anesthesia Procedure: ESOPHAGOGASTROSCOPY, ROBOTIC ASSISTED HIATAL HERNIA REPAIR AND  FUNDOPLICATION (Abdomen)    Surgeon(s) and Role:     * Andre Jimenez, Gregorio CHAUHAN MD - Primary    Pre-op vitals reviewed.  Temp: 98.3 °F (36.8 °C)  Pulse: 66  Resp: 17  BP: 164/94  SpO2: 96 %  Body mass index is 27.47 kg/m².    Current medications reviewed.  Hospital Medications:   acetaminophen (Tylenol Extra Strength) tab 1,000 mg  1,000 mg Oral Once    lactated ringers infusion   Intravenous Continuous    [COMPLETED] heparin (Porcine) 5000 UNIT/ML injection 5,000 Units  5,000 Units Subcutaneous Once    ceFAZolin (Ancef) 2 g in 20mL IV syringe premix  2 g Intravenous Once       Outpatient Medications:     Medications Prior to Admission   Medication Sig Dispense Refill Last Dose    LOSARTAN 50 MG Oral Tab TAKE 1 TABLET EVERY DAY 90 tablet 3 4/7/2024 at 0800    PANTOPRAZOLE 40 MG Oral Tab EC TAKE 1 TABLET BEFORE BREAKFAST 90 tablet 3 4/7/2024 at 0800    Cholecalciferol (VITAMIN D) 1000 units Oral Tab Take 2,000 Units by mouth daily.   4/7/2024 at 0800       Allergies: Latex and Morphine sulfate in dextrose      Anesthesia Evaluation    Patient summary reviewed.    Anesthetic Complications  (-) history of anesthetic complications         GI/Hepatic/Renal      (+) GERD                           Cardiovascular        Exercise tolerance: good     MET: >4      (+) hypertension   (+) hyperlipidemia                                  Endo/Other    Negative endo/other ROS.                              Pulmonary               (+) shortness of breath            Neuro/Psych    Negative neuro/psych ROS.                                  Past Surgical History:   Procedure Laterality Date    CATARACT      COLONOSCOPY  11/13/2014    COLONOSCOPY       EGD      HYSTERECTOMY      partial hysterec    SIGMOIDOSCOPY,DIAGNOSTIC       Social History     Socioeconomic History    Marital status:    Tobacco Use    Smoking status: Never    Smokeless tobacco: Never   Vaping Use    Vaping Use: Never used   Substance and Sexual Activity    Alcohol use: No     Alcohol/week: 0.0 standard drinks of alcohol    Drug use: No    Sexual activity: Never   Other Topics Concern    Caffeine Concern No    Exercise No     Comment: biking    Seat Belt Yes     History   Drug Use No     Available pre-op labs reviewed.  Lab Results   Component Value Date    WBC 6.3 03/11/2024    RBC 4.76 03/11/2024    HGB 14.4 03/11/2024    HCT 43.8 03/11/2024    MCV 92.0 03/11/2024    MCH 30.3 03/11/2024    MCHC 32.9 03/11/2024    RDW 13.2 03/11/2024    .0 03/11/2024     Lab Results   Component Value Date     03/11/2024    K 4.1 03/11/2024     03/11/2024    CO2 27.0 03/11/2024    BUN 10 03/11/2024    CREATSERUM 0.78 03/11/2024     (H) 03/11/2024    CA 9.3 03/11/2024            Airway      Mallampati: II  Mouth opening: >3 FB  TM distance: > 6 cm  Neck ROM: full Cardiovascular    Cardiovascular exam normal.  Rhythm: regular  Rate: normal     Dental    Dentition appears grossly intact         Pulmonary    Pulmonary exam normal.  Breath sounds clear to auscultation bilaterally.               Other findings              ASA: 2   Plan: general  NPO status verified and patient meets guidelines.  Patient has not taken beta blockers in last 24 hours.  Post-procedure pain management plan discussed with surgeon and patient.  Surgeon requests: regional block  Comment: I spoke with the patient and discussed the risks of general anesthesia, which include nausea and vomiting; sore throat; injury to the lips, gums, teeth, and eyes; cardiac, pulmonary, and neurologic events; aspiration; and allergic reactions. The patient understands these risks and consents to receiving general anesthesia  for this procedure.  Plan/risks discussed with: spouse and patient  Use of blood product(s) discussed with: patient    Consented to blood products.          Present on Admission:  **None**

## 2024-04-08 NOTE — PLAN OF CARE
NURSING ADMISSION NOTE      Patient admitted via Cart  Oriented to room.  Safety precautions initiated.  Bed in low position.  Call light in reach.      Problem: Patient/Family Goals  Goal: Patient/Family Long Term Goal  Description: Patient's Long Term Goal: DC to home     Interventions:  - Follow plan of care       - See additional Care Plan goals for specific interventions  Outcome: Progressing  Goal: Patient/Family Short Term Goal  Description: Patient's Short Term Goal:   4/8:  control pain , get better     Interventions:   - IV fluid, pain med , IV ABX       - See additional Care Plan goals for specific interventions  Outcome: Progressing     Problem: PAIN - ADULT  Goal: Verbalizes/displays adequate comfort level or patient's stated pain goal  Description: INTERVENTIONS:  - Encourage pt to monitor pain and request assistance  - Assess pain using appropriate pain scale  - Administer analgesics based on type and severity of pain and evaluate response  - Implement non-pharmacological measures as appropriate and evaluate response  - Consider cultural and social influences on pain and pain management  - Manage/alleviate anxiety  - Utilize distraction and/or relaxation techniques  - Monitor for opioid side effects  - Notify MD/LIP if interventions unsuccessful or patient reports new pain  - Anticipate increased pain with activity and pre-medicate as appropriate  Outcome: Progressing     Problem: SAFETY ADULT - FALL  Goal: Free from fall injury  Description: INTERVENTIONS:  - Assess pt frequently for physical needs  - Identify cognitive and physical deficits and behaviors that affect risk of falls.  - Cornish fall precautions as indicated by assessment.  - Educate pt/family on patient safety including physical limitations  - Instruct pt to call for assistance with activity based on assessment  - Modify environment to reduce risk of injury  - Provide assistive devices as appropriate  - Consider OT/PT consult to  assist with strengthening/mobility  - Encourage toileting schedule  Outcome: Progressing     Problem: DISCHARGE PLANNING  Goal: Discharge to home or other facility with appropriate resources  Description: INTERVENTIONS:  - Identify barriers to discharge w/pt and caregiver  - Include patient/family/discharge partner in discharge planning  - Arrange for needed discharge resources and transportation as appropriate  - Identify discharge learning needs (meds, wound care, etc)  - Arrange for interpreters to assist at discharge as needed  - Consider post-discharge preferences of patient/family/discharge partner  - Complete POLST form as appropriate  - Assess patient's ability to be responsible for managing their own health  - Refer to Case Management Department for coordinating discharge planning if the patient needs post-hospital services based on physician/LIP order or complex needs related to functional status, cognitive ability or social support system  Outcome: Progressing     Problem: GASTROINTESTINAL - ADULT  Goal: Maintains or returns to baseline bowel function  Description: INTERVENTIONS:  - Assess bowel function  - Maintain adequate hydration with IV or PO as ordered and tolerated  - Evaluate effectiveness of GI medications  - Encourage mobilization and activity  - Obtain nutritional consult as needed  - Establish a toileting routine/schedule  - Consider collaborating with pharmacy to review patient's medication profile  Outcome: Progressing

## 2024-04-08 NOTE — OPERATIVE REPORT
[facility]    PATIENT'S NAME: JERSEY TEJADA   ATTENDING PHYSICIAN: Gregorio Powell Jr, MD   OPERATING PHYSICIAN: Gregorio Powell Jr, MD   PATIENT ACCOUNT#:   [id number]    LOCATION:  [address] [place] [address]  MEDICAL RECORD #:   [id number]       YOB: 1950  ADMISSION DATE:       04/08/2024      OPERATION DATE:  04/08/2024    OPERATIVE REPORT      PREOPERATIVE DIAGNOSIS:  Large symptomatic hiatal hernia.  POSTOPERATIVE DIAGNOSIS:  Large symptomatic hiatal hernia.  PROCEDURE:    1.   Robot-assisted reduction of herniated stomach.  2.   Closure of esophageal hiatus.  3.   Tal fundoplication.    ASSISTANT:  Domenica Mayorga PA-C.    ANESTHESIA:  General endotracheal anesthesia.    ANESTHESIOLOGIST:  Javier Hines MD.    SPECIMENS:  Hernia sac.    DRAINS:  None.      IMPLANTS:  None.    ESTIMATED BLOOD LOSS:  10 mL.      COMPLICATIONS:  None.    CONDITION:  Stable, to PACU.    INDICATIONS:  The patient is a 73-year-old woman with a large hiatal hernia.  This has been noted to enlarge over time and is also symptomatic with the patient experiencing frequent dysphagia and shortness of breath.  After a long discussion of the risks and benefits of surgery, she elected to proceed and she was brought to the operating room on April 8.    FINDINGS:  A large portion of the stomach and omentum was found in the chest and reduced easily.  After removal of the hernia sac, the esophagus was mobilized and, once appropriate amount of intraabdominal esophagus was seen, the hiatus was closed and a Tal fundoplication performed.    OPERATIVE TECHNIQUE:  Patient was brought to the operating room, laid supine, underwent general endotracheal anesthesia.  She underwent a TAP block with Anesthesia.  She was then prepped and draped in a sterile fashion after all pressure points were padded.  Time-out was performed to confirm patient and site of surgery.  I made a 5 mm incision at Alas point and entered the chest using the  Optiview trocar and a 0-degree scope.  After seeing myself pass through the anterior abdominal fascia, the muscle, as well as the posterior fascia, I could see that I was safely within the abdomen and I insufflated to a pressure of 15 mmHg using CO2 insufflation.  I then placed 6 additional trocars.  Five of these were 8 mm robotic trocars, 4 of them were arrayed across the abdomen just cephalad to the umbilicus, 1 was inferior and to the right of the umbilicus and this was an assistant port.  Finally, an additional 5 mm port was placed all the way to the right side through which a liver retractor was placed.  With all my trocars safely placed under direct visualization, I placed the patient in reverse Trendelenburg to 18 degrees.  I then placed the liver retractor to get the left lobe of the liver out of the way of the hiatus and secured it.  I then docked the da Maribell XI robot.  Once this was done, I placed instruments under direct visualization.  I placed tip-up double fenestrated in my leftmost arm.  Moving toward the right, I placed a robotic vessel sealer, then my camera, and finally a Cadiere forceps.  I then moved to the robotic console.  There, I began to reduce the hernia contents out of the chest.  It came easily with no significant adhesions of the abdominal contents into the chest.  With the contents reduced down, I entered the lesser sac and took the lesser omentum up toward the right crura.  Once I did this, I began to separate the peritoneal coverings of the right crura away from the hernia sac.  I continued this around, taking down the hernia sac throughout the left and right chest.  As I moved around to the left side, I also began to separate the hernia sac from the peritoneum around the left crura.  At this point, I turned my attention away from the hiatus and over to the fundus.  I began to separate the omentum off of the greater curvature at a level after the termination of the epiploic vessels.   I took this up and  the omentum off the stomach as it came up and around near the spleen.  Eventually, I did reach the left crura.  With the entirety of the fundus mobilized, I continued to takedown hernia sac by the left crura and from beneath the stomach.  With the entirety of the hernia sac mobilized, I transected it and it was removed from the abdomen and sent for pathology.  I then dissected posterior to the esophagus.  I was able to get from the right to the left and then passed a Penrose around the esophagus.  I used this Penrose as a retractor.  This allowed me greater ability to dissect up in the mediastinum.  I could clearly see the esophagus and I began dissection circumferentially around the esophagus up into the mediastinum.  The vagus nerves were identified and protected coming from both the right and the left chest.  I worked up and mobilized the esophagus above the level that the hernia contents had reached.  Once I did this, I assessed for intraabdominal esophageal length.  I could see that I had about 4 cm of intraabdominal length.  Satisfied that I had mobilized enough, I turned my attention to closing the hiatus.  To do this, I placed 3 sutures of 0 Ethibond; these were done in a horizontal mattress fashion, reinforced with Anselmo pledgets.  I began near the bottom of the hiatus and worked my way anteriorly.  After 3 sutures, I could see that the hiatus was closed sufficiently with a little bit of extra space to allow for future food boluses.  With the hiatus closed, I began my fundoplication.  I elected to do a Tal fundoplication.  Just prior to this, I could see 1 small injury on the anterior surface of the stomach.  This did not seem to enter the lumen of the stomach.  However, I did place a 3-0 silk suture in a figure-of-eight fashion along this site.  I then began my fundoplication.  I first re-created the angle of His by placing a 2-0 Ethibond suture at the base of the esophagus  to the beginning of the fundus just beyond the cardia.  My next stitch was again the left lateral esophagus to the fundus and then my third stitch was esophagus to left crura and then to fundus.  All my fundoplication sutures were 2-0 Ethibond.  With these 3 sutures placed, I then folded the fundus over the top of the esophagus.  I held this in place with my double fenestrated grasper and then placed sutures at approximately 10 and 2 o'clock at the anterior aspect of the hiatus along the diaphragm, suturing to the stomach.  After these 2 sutures, I placed 2 more sutures on the right side.  One of these was esophagus to crura to stomach and the next was fundus to right crura.  This created a nice fundoplication without any tension.  I then inspected for bleeding.  None was seen.  I removed the liver retractor.  I then began to remove my trocars under direct visualization, looking for any significant bleeding as they came out.  None was seen.  With all the trocars out and the robot undocked, I finally closed my incisions with 4-0 Monocryl.  Patient tolerated the procedure well.  I was present for the entirety of the procedure.    Dictated By Gregorio Powell Jr, MD  d: 04/08/2024 10:45:22  t: 04/08/2024 16:25:24  Job [id number]  RIWZANA/

## 2024-04-08 NOTE — ANESTHESIA POSTPROCEDURE EVALUATION
[facility]    Libertad Becerra Patient Status:  Inpatient   Age/Gender 73 year old female MRN [id number]   Location [facility] POST ANESTHESIA CARE UNIT Attending Gregorio Powell Jr., MD   Hosp Day # 0 PCP Tash Egan MD       Anesthesia Post-op Note    ROBOTIC ASSISTED HIATAL HERNIA REPAIR AND  FUNDOPLICATION    Procedure Summary       Date: 04/08/24 Room / Location:  MAIN OR 09 / [facility] MAIN OR    Anesthesia Start: 0725 Anesthesia Stop: 1047    Procedure: ROBOTIC ASSISTED HIATAL HERNIA REPAIR AND  FUNDOPLICATION (Abdomen) Diagnosis: (HIATEL HERNIA)    Surgeons: Gregorio Powell Jr., MD Anesthesiologist: Javier Hines MD    Anesthesia Type: general ASA Status: 2            Anesthesia Type: general    Vitals Value Taken Time   /76 04/08/24 1100   Temp 97.2 °F (36.2 °C) 04/08/24 1100   Pulse 67 04/08/24 1100   Resp 20 04/08/24 1100   SpO2 92 % 04/08/24 1100   Vitals shown include unfiled device data.    Patient Location: PACU    Anesthesia Type: general    Airway Patency: patent and extubated    Postop Pain Control: adequate    Mental Status: mildly sedated but able to meaningfully participate in the post-anesthesia evaluation    Nausea/Vomiting: none    Cardiopulmonary/Hydration status: stable euvolemic    Complications: no apparent anesthesia related complications    Postop vital signs: stable    Dental Exam: Unchanged from Preop    Patient to be discharged from PACU when criteria met.

## 2024-04-08 NOTE — DISCHARGE INSTRUCTIONS
Thoracic Surgery Post-Operative Appointment     Follow up appointment scheduled: with Dr. Powell on April 17th at 12:30pm located at the [facility].  Thank you.      Thoracic Surgery Discharge Instructions     Diet  Okay to advance to a full liquid diet on 4/10/24. Continue a full liquid diet until you see us for your follow up appointment.     Pain Management  You will be sent home with a prescription for pain medicine. This will be a liquid form of acetaminophen and hydrocodone. Ice packs can be helpful as well for surface level, skin incision pain.     You may also take liquid tylenol (acetaminophen) and ibuprofen.   -Note that your prescription pain medication contains 325mg of acetaminophen and the max dose for acetaminophen (Tylenol) is 4000 mg per day.  -Unless you have kidney problems, ibuprofen 600 mg every 6 hours can be used along with Tylenol for additional pain control.    Restrictions  Upon discharge home, do not soak in a tub/pool until after your first follow up to see me in the office. You may shower, washing incisions gently with soap and water.    Do not take any large, non-essential pills. For any essential pills larger than an \"Advil\", please crush.     If taking prescription pain medications you may become constipated. Fluids, fiber and over the counter stool softeners are helpful for this.    No heavy lifting, pushing, or pulling. Do not lift anything greater than the weight of a gallon of milk (about 5lbs).     Other than that, no activity restrictions. You should attempt to be as active as possible. What ever you feel up to doing, you can do. Walking is strongly encouraged. You may drive (not within 4 hours of taking prescription pain medications).     Exercises  You will be sent home with your breathing \"toys\" -- like your incentive spirometer. Use this frequently at home. 10 times per hour while awake, if possible. If watching TV, use it at every commercial break.    Follow-up  Appointment  My office will reach out to you regarding a follow up appointment, if not already scheduled. Appointment will be approximately 1-2 weeks after discharge.     If you are experiencing any of these symptoms, please call our office at [phone]. Office hours are Monday through Friday, 8 am to 4:30 pm.  If you are calling the office after hours, please call the Thoracic Surgery On-Call number [phone], and state that you are a patient from [facility] or [facility].      - Persistent fevers of 101.5 or greater  - Food getting \"stuck\"   - Worsening pain  - Signs of infection in your wound such as drainage, worsening of redness, swelling or     pain.  - Anything else that concerns you.

## 2024-04-08 NOTE — H&P
Thoracic Surgery H&P Note     Name: Libertad Becerra   Age: 73 year old   Sex: female.   MRN: [id number]    Reason for Consultation: hiatal hernia      Consulting Physician: Dr. Beaver     Subjective:     Chief Complaint: \"I'm ready for surgery\"     History of Present Illness:   Ms. Becerra is a 73 year old female never smoker presenting for surgical repair of her hiatal hernia.      Patient has a several year history of reflux and a year history of dysphagia. EGD from 10/25/23 showed a large paraesophageal hernia. Biopsies negative for malignancy. UGI from 12/6/23 showed large type 4 paraesophageal hernia. Patient was referred by Dr. Beaver to discuss surgical repair. Pre operative CT chest showed large hiatal hernia containing most of the stomach and omentum.      Patient reports improvement in her reflux with daily pantoprazole but she still has to watch what she eats. She avoids spicy food and tries to eat slower. For the past year, she reports about once daily episodes of dysphagia in which food gets caught in her throat. This most often occurs with foods like bread. She reports occasional gas pain. She also reports shortness of breath with going up one flight of stairs. Patient denies any cough, chest pain, fevers, chills, weight loss, vomiting or regurgitation. No change in symptoms since her last visit.      PMH includes cervical cancer 1976 s/p partial hysterectomy. No other thoracic or abdominal surgeries. No blood thinners. Patient has a family history of colon and breast cancer.     Review Of Systems:   10 point review of systems was conducted and was negative except for the pertinent positives listed in the above HPI.    Past Medical History:   Past Medical History:   Diagnosis Date    Abdominal hernia     Abdominal pain     Arthritis     Back pain     Back problem     Belching     Body piercing     CANCER     cervical cancer-partial hysterectomy    cataract s/p surgery     Chest pain      Fatigue     Flatulence/gas pain/belching     Frequent urination     Heartburn     Hemorrhoids     High blood pressure     High cholesterol     History of blood transfusion     FEW YEARS AGO    Indigestion     Leaking of urine     Night sweats     Pain in joints     Problems with swallowing     Shortness of breath     Sleep disturbance     Uncomfortable fullness after meals     Wears glasses     Weight loss        Past Surgical History:   Past Surgical History:   Procedure Laterality Date    CATARACT      COLONOSCOPY  11/13/2014    COLONOSCOPY      EGD      HYSTERECTOMY      partial hysterec    SIGMOIDOSCOPY,DIAGNOSTIC         Social History:   Social History     Socioeconomic History    Marital status:      Spouse name: Not on file    Number of children: Not on file    Years of education: Not on file    Highest education level: Not on file   Occupational History    Not on file   Tobacco Use    Smoking status: Never    Smokeless tobacco: Never   Vaping Use    Vaping Use: Never used   Substance and Sexual Activity    Alcohol use: No     Alcohol/week: 0.0 standard drinks of alcohol    Drug use: No    Sexual activity: Never   Other Topics Concern     Service Not Asked    Blood Transfusions Not Asked    Caffeine Concern No    Occupational Exposure Not Asked    Hobby Hazards Not Asked    Sleep Concern Not Asked    Stress Concern Not Asked    Weight Concern Not Asked    Special Diet Not Asked    Back Care Not Asked    Exercise No     Comment: biking    Bike Helmet Not Asked    Seat Belt Yes    Self-Exams Not Asked   Social History Narrative    Not on file     Social Determinants of Health     Financial Resource Strain: Not on file   Food Insecurity: Not on file   Transportation Needs: Not on file   Physical Activity: Not on file   Stress: Not on file   Social Connections: Not on file   Housing Stability: Not on file       Family History:   Family History   Problem Relation Age of Onset    Cancer Father      Colon Cancer Father     Cancer Mother     Breast Cancer Mother 70        early to mid 70's    Cancer Paternal Grandmother     Other (heart  problem) Maternal Grandmother        Allergies:  Allergies   Allergen Reactions    Latex HIVES    Morphine Sulfate In Dextrose RASH     States skin turned orange.       Medications:   No current facility-administered medications on file prior to encounter.     Current Outpatient Medications on File Prior to Encounter   Medication Sig Dispense Refill    LOSARTAN 50 MG Oral Tab TAKE 1 TABLET EVERY DAY 90 tablet 3    PANTOPRAZOLE 40 MG Oral Tab EC TAKE 1 TABLET BEFORE BREAKFAST 90 tablet 3    Cholecalciferol (VITAMIN D) 1000 units Oral Tab Take 1,000 Units by mouth daily.          acetaminophen (Tylenol Extra Strength) tab 1,000 mg  1,000 mg Oral Once    lactated ringers infusion   Intravenous Continuous    [COMPLETED] heparin (Porcine) 5000 UNIT/ML injection 5,000 Units  5,000 Units Subcutaneous Once    ceFAZolin (Ancef) 2 g in 20mL IV syringe premix  2 g Intravenous Once         Objective:      Vital Signs:  BP (!) 164/94 (BP Location: Right arm)   Pulse 66   Temp 98.3 °F (36.8 °C) (Temporal)   Resp 17   Ht 158.8 cm (5' 2.5\")   Wt 152 lb 9.6 oz (69.2 kg)   SpO2 96%   BMI 27.47 kg/m²     Physical Exam:  General: well appearing female in no acute distress  HEENT: Normocephalic, PERRL, EOMI, no scleral icterus  Neck: Supple, trachea midline, no JVD, no masses. Thyroid not grossly enlarged  Nodes: no cervical or supraclavicular lymphadenopathy appreciated  Heart: regular rate and rhythm. No murmurs, rubs or gallops. No lower extremity edema.  Lungs: Normal respiratory effort. Clear to ascultation bilaterally.   Abdomen: Soft, Non-tender, non-distended. No hepatosplenomegaly noted.  Extremities: No clubbing or cyanosis. No lateralizing weakness  Neuro: No gross cranial nerve defects, no loss of sensation  Psych:  oriented to person place and time, normal mood and  affect      Labs:   Lab Results   Component Value Date/Time    WBC 6.3 03/11/2024 08:35 AM    HGB 14.4 03/11/2024 08:35 AM    HCT 43.8 03/11/2024 08:35 AM    .0 03/11/2024 08:35 AM    MCV 92.0 03/11/2024 08:35 AM     Lab Results   Component Value Date/Time     03/11/2024 08:35 AM    K 4.1 03/11/2024 08:35 AM     03/11/2024 08:35 AM    CO2 27.0 03/11/2024 08:35 AM    BUN 10 03/11/2024 08:35 AM     (H) 03/11/2024 08:35 AM    CA 9.3 03/11/2024 08:35 AM     No results found for: \"INR\", \"PT\", \"PTT\"  No components found for: \"TROPI\"  Lab Results   Component Value Date/Time    ALB 4.0 02/26/2024 08:43 AM    TP 7.3 02/26/2024 08:43 AM    ALT 11 (L) 02/26/2024 08:43 AM    AST 17 02/26/2024 08:43 AM         Review of Data:   CT chest 3/22/24  FINDINGS:    LUNGS:  Scattered bilateral subsegmental atelectasis.  More compressive atelectasis in the lung bases.  VASCULATURE:  No visible pulmonary arterial thrombus or attenuation.    RASHIDA:  No enlarged adenopathy.    MEDIASTINUM:  There is a large hiatal hernia containing the majority of the stomach.  There is an air fluid level with barium to the level of the mid to upper esophagus.  No enlarged adenopathy.    CARDIAC:  No enlargement, pericardial thickening, or significant coronary artery calcification.  PLEURA:  No pneumothorax or effusion.    THORACIC AORTA:  No aneurysm.    CHEST WALL:  No enlarged axillary adenopathy.    LIMITED ABDOMEN:  Duodenal diverticulum.  Normal caliber mesenteric lymph nodes with minimal mesenteric ground-glass opacity.    BONES:  Hypertrophic endplate changes thoracic spine with endplate sclerosis mid to lower thoracic levels and upper lumbar levels.                   Impression   CONCLUSION:  Large hiatal hernia containing the majority of the stomach and omentum.     There is barium in the esophagus to the level of the upper esophagus.     Duodenal diverticulum.     Scattered bilateral subsegmental atelectasis with more  compressive atelectasis in the lung bases at the site of hiatal hernia.        UGI 12/6/23:   FINDINGS:  Air-contrast esophagram and upper GI was performed.     Deglutition appears within normal limits.  The air pharyngogram also appears within normal limits.  No tracheal aspiration is identified.  Esophageal peristalsis appears within the limits of normal.  No esophageal mass is appreciated.  No esophageal  stricture is seen.     There is a large type 4 paraesophageal hiatal hernia noted.  The GE junction is well above the diaphragm.  The greater curvature is located cephalad and the lesser curvature is caudal.  The stomach returns to the peritoneal cavity through the hiatus at  the level of the distal body/antrum.  There is is not appear to be obstruction to flow of barium into the distal stomach or duodenum.     There is mild mucosal fold thickening seen in the stomach.  Correlate for possibility of gastritis.  A focal mass is not appreciated.  No ulcerations are identified.     There is a large duodenal diverticulum seen in the 2nd portion of the duodenum measuring approximately 3 cm in diameter.  The duodenal bulb and sweep otherwise appear unremarkable.  The duodenal jejunal junction is normally located.                   Impression   CONCLUSION:    1. Large type 4 paraesophageal hiatal hernia noted.  No obstruction.  2. Mild mucosal fold thickening in the stomach could reflect gastritis.  3. 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  4. No esophageal stricture or mass appreciated.        EGD 10/25/23:   FINDINGS  1. Esophagus: Normal.  2. EGJ: Large paraesophageal hernia  3. Stomach: Normal  4. Duodenum: Normal      Assessment/Plan:     Ms. Becerra is a 73 year old female never smoker presenting for surgical repair of her hiatal hernia.      Patient has a several year history of reflux and a year history of dysphagia. EGD from 10/25/23 showed a large paraesophageal hernia. Biopsies  negative for malignancy. UGI from 12/6/23 showed large type 4 paraesophageal hernia. Patient was referred by Dr. Beaver to discuss surgical repair. Pre operative CT chest showed large hiatal hernia containing most of the stomach and omentum.     Discussed options including surgical repair and fundoplication. Given the large size of the hernia, repair is recommended. Discussed the risks and benefits of surgery. Patient expressed understanding and would like to proceed with surgery.     -Plan to proceed with robotic hiatal hernia repair and fundoplication today with Dr. Powell.     MORRIS ZaidiC  Thoracic Surgery

## 2024-04-08 NOTE — PROGRESS NOTES
Pt is admitted for hernia repair surgery.  Pt is AOX4.  VSS, afebrile and c/o left shoulder pain. Gave scheduled Toradol.  SpO2 maintained on RA. . Denies any SOB, cough. Tele-NSR. Heparin. SCD is on . NPO. Denies any n/v/d. Pt voided once since removed [place].  Up with SBA.  IV fluid is infusing . Abd incision looks intact, clean IV ABX. XR ordered tomorrow morning. WCTM. Pt is updated with plan of care.

## 2024-04-08 NOTE — ANESTHESIA PROCEDURE NOTES
Regional Block    Date/Time: 4/8/2024 7:37 AM    Performed by: Javier Hines MD  Authorized by: Javier Hines MD      General Information and Staff    Start Time:  4/8/2024 7:37 AM  End Time:  4/8/2024 7:40 AM  Anesthesiologist:  Javier Hines MD  Performed by:  Anesthesiologist  Patient Location:  OR    Block Placement: Post Induction  Site Identification: real time ultrasound guided and image stored and retrievable    Block site/laterality marked before start: site marked  Reason for Block: at surgeon's request and post-op pain management    Preanesthetic Checklist: 2 patient identifers, IV checked, risks and benefits discussed, monitors and equipment checked, pre-op evaluation, timeout performed, anesthesia consent, sterile technique used, no prohibitive neurological deficits and no local skin infection at insertion site      Procedure Details    Patient Position:  Supine  Prep: ChloraPrep    Monitoring:  Cardiac monitor, continuous pulse ox and blood pressure cuff  Block Type:  TAP  Laterality:  Bilateral  Injection Technique:  Single-shot    Needle    Needle Type:  Short-bevel and echogenic  Needle Gauge:  21 G  Needle Length:  100 mm  Needle Localization:  Ultrasound guidance  Reason for Ultrasound Use: appropriate spread of the medication was noted in real time and no ultrasound evidence of intravascular and/or intraneural injection            Assessment    Injection Assessment:  Good spread noted, negative resistance, negative aspiration for heme, incremental injection, low pressure and local visualized surrounding nerve on ultrasound  Paresthesia Pain:  None  Heart Rate Change: No    - Patient tolerated block procedure well without evidence of immediate block related complications.     Medications  4/8/2024 7:37 AM      Additional Comments    Medication:  Ropivacaine 0.50% 25 mL on each side

## 2024-04-09 ENCOUNTER — APPOINTMENT (OUTPATIENT)
Dept: GENERAL RADIOLOGY | Facility: HOSPITAL | Age: 74
End: 2024-04-09
Attending: STUDENT IN AN ORGANIZED HEALTH CARE EDUCATION/TRAINING PROGRAM
Payer: MEDICARE

## 2024-04-09 VITALS
RESPIRATION RATE: 20 BRPM | DIASTOLIC BLOOD PRESSURE: 63 MMHG | BODY MASS INDEX: 27.38 KG/M2 | HEIGHT: 62.5 IN | WEIGHT: 152.63 LBS | OXYGEN SATURATION: 91 % | TEMPERATURE: 98 F | HEART RATE: 52 BPM | SYSTOLIC BLOOD PRESSURE: 128 MMHG

## 2024-04-09 PROBLEM — K44.9 HIATAL HERNIA: Status: ACTIVE | Noted: 2024-04-09

## 2024-04-09 LAB
ANION GAP SERPL CALC-SCNC: 4 MMOL/L (ref 0–18)
BASOPHILS # BLD AUTO: 0.02 X10(3) UL (ref 0–0.2)
BASOPHILS NFR BLD AUTO: 0.2 %
BUN BLD-MCNC: 11 MG/DL (ref 9–23)
CALCIUM BLD-MCNC: 7.7 MG/DL (ref 8.5–10.1)
CHLORIDE SERPL-SCNC: 112 MMOL/L (ref 98–112)
CO2 SERPL-SCNC: 25 MMOL/L (ref 21–32)
CREAT BLD-MCNC: 0.72 MG/DL
EGFRCR SERPLBLD CKD-EPI 2021: 88 ML/MIN/1.73M2 (ref 60–?)
EOSINOPHIL # BLD AUTO: 0 X10(3) UL (ref 0–0.7)
EOSINOPHIL NFR BLD AUTO: 0 %
ERYTHROCYTE [DISTWIDTH] IN BLOOD BY AUTOMATED COUNT: 13.2 %
GLUCOSE BLD-MCNC: 84 MG/DL (ref 70–99)
HCT VFR BLD AUTO: 32.9 %
HGB BLD-MCNC: 11.3 G/DL
IMM GRANULOCYTES # BLD AUTO: 0.05 X10(3) UL (ref 0–1)
IMM GRANULOCYTES NFR BLD: 0.5 %
LYMPHOCYTES # BLD AUTO: 1.52 X10(3) UL (ref 1–4)
LYMPHOCYTES NFR BLD AUTO: 13.9 %
MCH RBC QN AUTO: 31 PG (ref 26–34)
MCHC RBC AUTO-ENTMCNC: 34.3 G/DL (ref 31–37)
MCV RBC AUTO: 90.1 FL
MONOCYTES # BLD AUTO: 0.79 X10(3) UL (ref 0.1–1)
MONOCYTES NFR BLD AUTO: 7.2 %
NEUTROPHILS # BLD AUTO: 8.58 X10 (3) UL (ref 1.5–7.7)
NEUTROPHILS # BLD AUTO: 8.58 X10(3) UL (ref 1.5–7.7)
NEUTROPHILS NFR BLD AUTO: 78.2 %
OSMOLALITY SERPL CALC.SUM OF ELEC: 291 MOSM/KG (ref 275–295)
PLATELET # BLD AUTO: 223 10(3)UL (ref 150–450)
POTASSIUM SERPL-SCNC: 4.1 MMOL/L (ref 3.5–5.1)
RBC # BLD AUTO: 3.65 X10(6)UL
SODIUM SERPL-SCNC: 141 MMOL/L (ref 136–145)
WBC # BLD AUTO: 11 X10(3) UL (ref 4–11)

## 2024-04-09 PROCEDURE — 74240 X-RAY XM UPR GI TRC 1CNTRST: CPT | Performed by: STUDENT IN AN ORGANIZED HEALTH CARE EDUCATION/TRAINING PROGRAM

## 2024-04-09 PROCEDURE — 99232 SBSQ HOSP IP/OBS MODERATE 35: CPT | Performed by: INTERNAL MEDICINE

## 2024-04-09 RX ORDER — NALOXONE HYDROCHLORIDE 4 MG/.1ML
4 SPRAY NASAL AS NEEDED
Qty: 1 KIT | Refills: 0 | Status: SHIPPED | OUTPATIENT
Start: 2024-04-09

## 2024-04-09 NOTE — PLAN OF CARE
Pt is admitted for hernia repair surgery.  Pt is AOX4.  VSS, afebrile and c/o left shoulder pain. Gave scheduled Toradol.  SpO2 maintained on RA. . Denies any SOB, cough. Tele-SB. Heparin. SCD is on . Clear liquid diet. Pt is tolerating clear liquid diet well. Denies any abd discomfort. Denies any n/v/d. Up with SBA. Pt walkedmultiple times in the hallway.   IV fluid DC -ed.  Abd incision looks intact and clean. XR done . Surgery and Hospitalist signed off. Pt is DC ing with clear liquid diet and starting full liquid diet tomorrow. OP follow up next week.   WCTM. Pt is updated with plan of care.            Problem: Patient/Family Goals  Goal: Patient/Family Long Term Goal  Description: Patient's Long Term Goal: DC to home     Interventions:  - Follow plan of care       - See additional Care Plan goals for specific interventions  Outcome: Progressing  Goal: Patient/Family Short Term Goal  Description: Patient's Short Term Goal:   4/8:  control pain , get better   4/9: control pain, tolerate food, bowel movement     Interventions:   - clear liquid diet , walk , IS   - IV fluid, pain med , IV ABX       - See additional Care Plan goals for specific interventions  Outcome: Progressing     Problem: PAIN - ADULT  Goal: Verbalizes/displays adequate comfort level or patient's stated pain goal  Description: INTERVENTIONS:  - Encourage pt to monitor pain and request assistance  - Assess pain using appropriate pain scale  - Administer analgesics based on type and severity of pain and evaluate response  - Implement non-pharmacological measures as appropriate and evaluate response  - Consider cultural and social influences on pain and pain management  - Manage/alleviate anxiety  - Utilize distraction and/or relaxation techniques  - Monitor for opioid side effects  - Notify MD/LIP if interventions unsuccessful or patient reports new pain  - Anticipate increased pain with activity and pre-medicate as appropriate  Outcome:  Progressing     Problem: SAFETY ADULT - FALL  Goal: Free from fall injury  Description: INTERVENTIONS:  - Assess pt frequently for physical needs  - Identify cognitive and physical deficits and behaviors that affect risk of falls.  - [place] fall precautions as indicated by assessment.  - Educate pt/family on patient safety including physical limitations  - Instruct pt to call for assistance with activity based on assessment  - Modify environment to reduce risk of injury  - Provide assistive devices as appropriate  - Consider OT/PT consult to assist with strengthening/mobility  - Encourage toileting schedule  Outcome: Progressing     Problem: DISCHARGE PLANNING  Goal: Discharge to home or other facility with appropriate resources  Description: INTERVENTIONS:  - Identify barriers to discharge w/pt and caregiver  - Include patient/family/discharge partner in discharge planning  - Arrange for needed discharge resources and transportation as appropriate  - Identify discharge learning needs (meds, wound care, etc)  - Arrange for interpreters to assist at discharge as needed  - Consider post-discharge preferences of patient/family/discharge partner  - Complete POLST form as appropriate  - Assess patient's ability to be responsible for managing their own health  - Refer to Case Management Department for coordinating discharge planning if the patient needs post-hospital services based on physician/LIP order or complex needs related to functional status, cognitive ability or social support system  Outcome: Progressing     Problem: GASTROINTESTINAL - ADULT  Goal: Maintains or returns to baseline bowel function  Description: INTERVENTIONS:  - Assess bowel function  - Maintain adequate hydration with IV or PO as ordered and tolerated  - Evaluate effectiveness of GI medications  - Encourage mobilization and activity  - Obtain nutritional consult as needed  - Establish a toileting routine/schedule  - Consider collaborating with  pharmacy to review patient's medication profile  Outcome: Progressing

## 2024-04-09 NOTE — PLAN OF CARE
Assumed care for pt. At 1930. Pt. Resting in bed on and off, c/o left upper abdominal/to left shoulder discomfort. Some relief noted with scheduled pain medications. A&ox4. Lungs clear on room air. Voiding freely. Up with standby assist to the bathroom. Lap sites x6 to abdomen with steri-strips and skin glue. All sites are C/D/I with no drainage. NPO. IVF infusing. Plan upper GI/esophagus xray in the AM. Will continue to monitor.     Problem: Patient/Family Goals  Goal: Patient/Family Long Term Goal  Description: Patient's Long Term Goal: DC to home     Interventions:  - Follow plan of care       - See additional Care Plan goals for specific interventions  Outcome: Progressing  Goal: Patient/Family Short Term Goal  Description: Patient's Short Term Goal:   4/8:  control pain , get better     Interventions:   - IV fluid, pain med , IV ABX       - See additional Care Plan goals for specific interventions  Outcome: Progressing     Problem: PAIN - ADULT  Goal: Verbalizes/displays adequate comfort level or patient's stated pain goal  Description: INTERVENTIONS:  - Encourage pt to monitor pain and request assistance  - Assess pain using appropriate pain scale  - Administer analgesics based on type and severity of pain and evaluate response  - Implement non-pharmacological measures as appropriate and evaluate response  - Consider cultural and social influences on pain and pain management  - Manage/alleviate anxiety  - Utilize distraction and/or relaxation techniques  - Monitor for opioid side effects  - Notify MD/LIP if interventions unsuccessful or patient reports new pain  - Anticipate increased pain with activity and pre-medicate as appropriate  Outcome: Progressing     Problem: SAFETY ADULT - FALL  Goal: Free from fall injury  Description: INTERVENTIONS:  - Assess pt frequently for physical needs  - Identify cognitive and physical deficits and behaviors that affect risk of falls.  - [place] fall precautions as  indicated by assessment.  - Educate pt/family on patient safety including physical limitations  - Instruct pt to call for assistance with activity based on assessment  - Modify environment to reduce risk of injury  - Provide assistive devices as appropriate  - Consider OT/PT consult to assist with strengthening/mobility  - Encourage toileting schedule  Outcome: Progressing     Problem: DISCHARGE PLANNING  Goal: Discharge to home or other facility with appropriate resources  Description: INTERVENTIONS:  - Identify barriers to discharge w/pt and caregiver  - Include patient/family/discharge partner in discharge planning  - Arrange for needed discharge resources and transportation as appropriate  - Identify discharge learning needs (meds, wound care, etc)  - Arrange for interpreters to assist at discharge as needed  - Consider post-discharge preferences of patient/family/discharge partner  - Complete POLST form as appropriate  - Assess patient's ability to be responsible for managing their own health  - Refer to Case Management Department for coordinating discharge planning if the patient needs post-hospital services based on physician/LIP order or complex needs related to functional status, cognitive ability or social support system  Outcome: Progressing     Problem: GASTROINTESTINAL - ADULT  Goal: Maintains or returns to baseline bowel function  Description: INTERVENTIONS:  - Assess bowel function  - Maintain adequate hydration with IV or PO as ordered and tolerated  - Evaluate effectiveness of GI medications  - Encourage mobilization and activity  - Obtain nutritional consult as needed  - Establish a toileting routine/schedule  - Consider collaborating with pharmacy to review patient's medication profile  Outcome: Progressing

## 2024-04-09 NOTE — PROGRESS NOTES
Thoracic Surgery Progress Note     Libertad Becerra is a 73 year old female. MRN [id number]. Admitted 2024    SUBJECTIVE/24H EVENTS:     No acute events overnight. Feels well. Denies pain or nausea.     Objective:     VITALS:     Temp (24hrs), Av.8 °F (36.6 °C), Min:96.7 °F (35.9 °C), Max:98.4 °F (36.9 °C)   /55 (BP Location: Right arm)   Pulse 60   Temp 97.8 °F (36.6 °C) (Oral)   Resp 20   Ht 158.8 cm (5' 2.5\")   Wt 152 lb 9.6 oz (69.2 kg)   SpO2 93%   BMI 27.47 kg/m²     EXAM:   General: well appearing female in no acute distress  Heart: regular rate and rhythm.   Lungs: Normal respiratory effort. Equal chest rise bilaterally  Incisions: c/d/i    Abdomen: Soft, minimal tenderness, non-distended.  Extremities: No clubbing or cyanosis. No lateralizing weakness  Neuro: no focal defects  Psych:  oriented to person place and time, normal mood and affect      Intake/Output Summary (Last 24 hours) at 2024 0903  Last data filed at 2024 0556  Gross per 24 hour   Intake 3376 ml   Output 720 ml   Net 2656 ml         MEDS:   acetaminophen  1,000 mg Intravenous Q6H    heparin  5,000 Units Subcutaneous 2 times per day    ketorolac  15 mg Intravenous Q6H    pantoprazole  40 mg Intravenous QAM AC       LABS:  Lab Results   Component Value Date    WBC 11.0 2024    HGB 11.3 2024    HCT 32.9 2024    .0 2024    CREATSERUM 0.72 2024    BUN 11 2024     2024    K 4.1 2024     2024    CO2 25.0 2024    GLU 84 2024    CA 7.7 2024         Assessment/Plan:     73 year old female 1 day post op from robotic assisted hiatal hernia repair and fundoplication.     -UGI this morning.   -NPO until UGI results. If okay, will advance to a clear liquid diet.   -Continue IVF until tolerating fluids.   -Nutrition consult for education regarding a full liquid and soft diet.   -Pain control.   -Maintain saturations above 90%.    -Ambulate 3-4 times per day in the halls. Spend majority of day out of bed, in chair. Encouraged cough and IS use 10x hourly.       Domenica Mayorga PA-C  Thoracic Surgery  Pager: [phone]

## 2024-04-09 NOTE — SLP NOTE
ADULT SWALLOWING EVALUATION    ASSESSMENT    ASSESSMENT/OVERALL IMPRESSION:  Order received for bedside swallow evaluation to r/o aspiration. Pt admitted to [facility] for scheduled surgery to repair large symptomatic hiatal hernia; s/p reduction of herniated stomach, closure of esophageal hiatus and fundoplication. Pmhx includes GERD, dysphagia, large paraesophageal hernia, cervical CA, HTN and HLD. Pt is currently restricted to clear liquid diet by GI.     Pt found lying semi-upright in bed; alert & participatory; able to follow all commands and participate in conversation appropriately; good historian. Pt reported significant difficulties swallowing prior to surgery described as sensation of food (especially bread) getting caught in throat indicating lower pharyngeal/esophageal region. She denied any current difficulties swallowing liquids post surgery. Oral mech/motor exam revealed pt with her own dentition which appeared intact & functional. Oral cavity appeared clean & moist. No significant oral motor deficits discovered. Clear vocal quality, volitional swallow and productive cough elicited. Pt independently positioned herself to side of bed and observed eating thin and pureed trials (ginger ale, broth and jello) from noon tray. Adequate retrieval & containment of bolus noted with timely oral transit no observed residue. Pharyngeal response appeared timely with adequate hyolaryngeal excursion when palplated and clear vocal quality following all po trials. No overt clinical s/s of aspiration noted or reported.     Swallow mechanism appears to be WFL with clear liquid diet - no overt clinical s/s of aspiration and much improved swallow function reported by patient at this time    Discussed results of exam, diet recommendations, aspiration precautions and plan with patient who reported good understanding. Answered questions to her apparent satisfaction with good understanding reported. Will continue to follow for  on-going evaluation as diet advanced.         RECOMMENDATIONS   Diet Recommendations - Solids: NPO (clear liquids only per GI at this time)  Diet Recommendations - Liquids: Thin Liquids (clear liquids only at this time)                        Compensatory Strategies Recommended: Slow rate;Alternate consistencies;Small bites and sips  Aspiration Precautions: Upright position;Slow rate;Small bites and sips  Medication Administration Recommendations: No restrictions  Treatment Plan/Recommendations: SLP to reassess (as diet advanced by GI)    HISTORY   MEDICAL HISTORY  Reason for Referral: R/O aspiration    Problem List  Principal Problem:    Hiatal hernia  Active Problems:    Hyperlipidemia    Primary hypertension      Past Medical History  Past Medical History:   Diagnosis Date    Abdominal hernia     Abdominal pain     Arthritis     Back pain     Back problem     Belching     Body piercing     CANCER     cervical cancer-partial hysterectomy    cataract s/p surgery     Chest pain     Fatigue     Flatulence/gas pain/belching     Frequent urination     Heartburn     Hemorrhoids     High blood pressure     High cholesterol     History of blood transfusion     FEW YEARS AGO    Indigestion     Leaking of urine     Night sweats     Pain in joints     Problems with swallowing     Shortness of breath     Sleep disturbance     Uncomfortable fullness after meals     Wears glasses     Weight loss        Prior Living Situation:  (Home)  Diet Prior to Admission: Regular;Thin liquids;Soft/ Easy to Chew       Patient/Family Goals: pt hoping to return home today     SWALLOWING HISTORY  Current Diet Consistency:  (restricted to clear liquid diet per GI)  Dysphagia History: see above  Imaging Results: no recent CXR     SUBJECTIVE       OBJECTIVE   ORAL MOTOR EXAMINATION  Dentition: Natural;Functional  Symmetry: Within Functional Limits  Strength: Within Functional Limits  Tone: Within Functional Limits  Range of Motion: Within  Functional Limits  Rate of Motion: Within Functional Limits    Voice Quality: Clear  Respiratory Status: Unlabored  Consistencies Trialed:  (clear liquid po trials of broth, jello and ginger ale)  Method of Presentation: Self presentation;Spoon;Cup;Straw;Single sips;Consecutive swallows  Patient Positioning: Upright;Midline (pt sitting up on side of bed independently)    Oral Phase of Swallow: Within Functional Limits                      Pharyngeal Phase of Swallow:  (appears to be WFL - no overt clinical s/s of aspiration noted)           (Please note: Silent aspiration cannot be evaluated clinically. Videofluoroscopic Swallow Study is required to rule-out silent aspiration.)    Esophageal Phase of Swallow:  (no complaints at this time)  Comments:               GOALS  Goal #1 On-going reassessment of swallow function with advance in diet from clear liquids  New goal                                        FOLLOW UP  Treatment Plan/Recommendations: SLP to reassess (as diet advanced by GI)  Number of Visits to Meet Established Goals: 1  Follow Up Needed (Documentation Required): Yes (as diet advanced by GI)  SLP Follow-up Date: 04/10/24    Thank you for your referral.   If you have any questions, please contact HEIDI Lemus MA, CCC-SLP  Pager [phone]

## 2024-04-09 NOTE — SLP NOTE
Order received for bedside swallow evaluation per modified diet protocol. Pt is currently NPO following hiatal hernia repair and fundoplication. Plan for GI/esophagus xray this am. Pt is unfamiliar to this department. Will continue to follow and re-attempt exam when appropriate.    Gerda Perez MA, CCC-SLP  Pager [phone]

## 2024-04-09 NOTE — DISCHARGE SUMMARY
[facility]   part of [facility]    Discharge Summary    Libertad Becerra Patient Status:  Inpatient    1950 MRN [id number]   Location [facility] 8NE-A Attending Andre Jimenez, Gregorio CHAUHAN MD   Hosp Day # 1 PCP Tash Egan MD     Date of Admission: 2024 Disposition: Home or Self Care     Date of Discharge: 2024    Admitting Diagnosis: HIATEL HERNIA    Discharge Diagnosis:   Patient Active Problem List   Diagnosis    Other hyperlipidemia    Environmental allergies    History of cervical cancer    Benign essential HTN    DDD (degenerative disc disease), lumbar    Cervical radiculopathy    Gastroesophageal reflux disease    Dense breast tissue on mammogram    Dysphagia, unspecified    History of adenomatous polyp of colon    Family history of colon cancer    Hyperlipidemia    Primary hypertension    Hiatal hernia       Reason for Admission: HIATEL HERNIA    Physical Exam:  General: well appearing female in no acute distress  HEENT: Normocephalic, PERRL, EOMI, no scleral icterus  Heart: regular rate and rhythm. No lower extremity edema.  Lungs: Normal respiratory effort. Equal chest rise bilaterally.    Abdomen: Soft, non-distended. Wounds clean, dry and intact.  No erythema, swelling, drainage or other signs of infection  Extremities: No clubbing or cyanosis. No lateralizing weakness  Neuro: No gross cranial nerve defects, no loss of sensation  Psych: oriented to person place and time, normal mood and affect     History of Present Illness: Patient is a 73 year old female admitted for surgery for hiatal hernia on 2024    Hospital Course: Patient underwent surgery on 2024. She did well post-operatively. After an initial stay in the PACU, she went to the general floor. There she was able to get up and move around on their own. On POD1, UGI was negative and patient was advanced to a clear liquid diet. Prior to discharge, patient was tolerating a clear liquid diet, pain was  controlled, and they were sent home in good condition.    Final Pathology: pending    Consultations: Internal Medicine    Procedures: ROBOTIC ASSISTED HIATAL HERNIA REPAIR AND  FUNDOPLICATION    Complications: none    Discharge Condition: Good    Discharge Medications:      Discharge Medications        START taking these medications        Instructions Prescription details   HYDROcodone-acetaminophen 7.5-325 MG/15ML Soln      Take 10 mL by mouth every 4 (four) hours as needed.   Quantity: 300 mL  Refills: 0     Naloxone HCl 4 MG/0.1ML Liqd      4 mg by Nasal route as needed. If patient remains unresponsive, repeat dose in other nostril 2-5 minutes after first dose.   Quantity: 1 kit  Refills: 0            CONTINUE taking these medications        Instructions Prescription details   losartan 50 MG Tabs  Commonly known as: Cozaar      TAKE 1 TABLET EVERY DAY   Quantity: 90 tablet  Refills: 3     pantoprazole 40 MG Tbec  Commonly known as: Protonix      TAKE 1 TABLET BEFORE BREAKFAST   Quantity: 90 tablet  Refills: 3     Vitamin D 1000 units Tabs      Take 2,000 Units by mouth daily.   Refills: 0               Where to Get Your Medications        These medications were sent to [id number] - [place], [place] - [address] [phone], [phone] [address] [facility] [address]      Phone: [phone]   HYDROcodone-acetaminophen 7.5-325 MG/15ML Soln  Naloxone HCl 4 MG/0.1ML Liqd         Follow up Visits: Follow-up with Dr. Powell in 1-2 weeks.     Other Discharge Instructions: see separate d/c instructions    Time Spent: 35 minutes    Domenica Mayorga PA-C  4/9/2024

## 2024-04-09 NOTE — PROGRESS NOTES
[facility]   part of [facility]     Hospitalist Progress Note     Libertad Becerra Patient Status:  Inpatient    1950 MRN [id number]   [facility] 8NE-A Attending Andre Jimenez, Gregorio CHAUHAN MD   Hosp Day # 1 PCP Tash Egan MD     Chief Complaint: Medical management    Subjective:     Patient feeling good.  Minimal 1 out of 10 pain.  Patient seen with patient's family including patient's  and son at bedside.  Tolerating diet.  No nausea vomiting.    Objective:    Review of Systems:   A comprehensive review of systems was completed; pertinent positive and negatives stated in subjective.    Vital signs:  Temp:  [97.8 °F (36.6 °C)-98.4 °F (36.9 °C)] 97.8 °F (36.6 °C)  Pulse:  [52-81] 52  Resp:  [17-20] 20  BP: (102-130)/(39-67) 128/63  SpO2:  [90 %-93 %] 91 %    Physical Exam:    /63   Pulse 52   Temp 97.8 °F (36.6 °C) (Oral)   Resp 20   Ht 5' 2.5\" (1.588 m)   Wt 152 lb 9.6 oz (69.2 kg)   SpO2 91%   BMI 27.47 kg/m²     General: No acute distress  Respiratory: Clear to auscultation bilateral, no wheezes, no rhonchi  Cardiovascular: S1, S2, regular rate and rhythm  Abdomen: Soft, laparoscopic abdominal wall incision and Steri-Strips and healing well.  Non-tender, non-distended, positive bowel sounds  Neuro: Awake alert, no new focal deficits.   Extremities: No pedal edema  No calf tenderness    Diagnostic Data:    Labs:  Recent Labs   Lab 24  0437   WBC 11.0   HGB 11.3*   MCV 90.1   .0       Recent Labs   Lab 24  0437   GLU 84   BUN 11   CREATSERUM 0.72   CA 7.7*      K 4.1      CO2 25.0       Estimated Creatinine Clearance: 55.5 mL/min (based on SCr of 0.72 mg/dL).    No results for input(s): \"TROP\", \"TROPHS\", \"CK\" in the last 168 hours.    No results for input(s): \"PTP\", \"INR\" in the last 168 hours.               Microbiology    No results found for this visit on 24.      Imaging: Reviewed in Epic.    Medications:    heparin  5,000  Units Subcutaneous 2 times per day    ketorolac  15 mg Intravenous Q6H    pantoprazole  40 mg Intravenous QAM AC       Assessment & Plan:    Status post robot-assisted reduction of herniated stomach and closure of esophageal hiatus and Tal fundoplication by CT surgery Dr. Gregorio Powell-managed by CT surgeon     Hypertension  Continue home Diovan at the time of discharge  IV hydralazine as needed systolic blood pressure more than 160 after pain control  GERD  Continue home PPI  Hyperlipidemia  Not on any home medication for this  Follow-up with regular outpatient primary care physician regarding follow-up of this    Discharge planning per CT surgeon.  Final discharge order from primary admitting/admitting service CT surgery.  Wound care and activity per CT surgeon.  Medically okay to discharge when okay with CV surgeon.  Discussed with RN.  Discussed with patient and patient's family at bedside.        Ada Noriega MD    Supplementary Documentation:     Quality:  DVT Mechanical Prophylaxis:   SCDs, Early ambuation  DVT Pharmacologic Prophylaxis   Medication    heparin (Porcine) 5000 UNIT/ML injection 5,000 Units                Code Status: Full Code  Craig: No urinary catheter in place  Craig Duration (in days):   Central line:    TALON: 4/9/2024    Discharge is dependent on: Per CT surgeon  At this point Ms. Becerra is expected to be discharge to: Home    The 21st Century Cures Act makes medical notes like these available to patients in the interest of transparency. Please be advised this is a medical document. Medical documents are intended to carry relevant information, facts as evident, and the clinical opinion of the practitioner. The medical note is intended as peer to peer communication and may appear blunt or direct. It is written in medical language and may contain abbreviations or verbiage that are unfamiliar.

## 2024-04-09 NOTE — PLAN OF CARE
Surgery and hospitalist signed off.     Discharged  home via wheelchair  Accompanied by Support staff   Belongings taken by patient/family  PIV removed, no bleeding noted at the  site.   Tele box removed & placed in the drawer  Discharge Navigator completed  Discharge instructions reviewed with patient a bedside  All questions & concerns addressed at his time.           Problem: Patient/Family Goals  Goal: Patient/Family Long Term Goal  Description: Patient's Long Term Goal: DC to home     Interventions:  - Follow plan of care       - See additional Care Plan goals for specific interventions  Outcome: Progressing  Goal: Patient/Family Short Term Goal  Description: Patient's Short Term Goal:   4/8:  control pain , get better   4/9: control pain, tolerate food, bowel movement     Interventions:   - clear liquid diet , walk , IS   - IV fluid, pain med , IV ABX       - See additional Care Plan goals for specific interventions  Outcome: Progressing     Problem: PAIN - ADULT  Goal: Verbalizes/displays adequate comfort level or patient's stated pain goal  Description: INTERVENTIONS:  - Encourage pt to monitor pain and request assistance  - Assess pain using appropriate pain scale  - Administer analgesics based on type and severity of pain and evaluate response  - Implement non-pharmacological measures as appropriate and evaluate response  - Consider cultural and social influences on pain and pain management  - Manage/alleviate anxiety  - Utilize distraction and/or relaxation techniques  - Monitor for opioid side effects  - Notify MD/LIP if interventions unsuccessful or patient reports new pain  - Anticipate increased pain with activity and pre-medicate as appropriate  Outcome: Progressing     Problem: SAFETY ADULT - FALL  Goal: Free from fall injury  Description: INTERVENTIONS:  - Assess pt frequently for physical needs  - Identify cognitive and physical deficits and behaviors that affect risk of falls.  - [place] fall  precautions as indicated by assessment.  - Educate pt/family on patient safety including physical limitations  - Instruct pt to call for assistance with activity based on assessment  - Modify environment to reduce risk of injury  - Provide assistive devices as appropriate  - Consider OT/PT consult to assist with strengthening/mobility  - Encourage toileting schedule  Outcome: Progressing     Problem: DISCHARGE PLANNING  Goal: Discharge to home or other facility with appropriate resources  Description: INTERVENTIONS:  - Identify barriers to discharge w/pt and caregiver  - Include patient/family/discharge partner in discharge planning  - Arrange for needed discharge resources and transportation as appropriate  - Identify discharge learning needs (meds, wound care, etc)  - Arrange for interpreters to assist at discharge as needed  - Consider post-discharge preferences of patient/family/discharge partner  - Complete POLST form as appropriate  - Assess patient's ability to be responsible for managing their own health  - Refer to Case Management Department for coordinating discharge planning if the patient needs post-hospital services based on physician/LIP order or complex needs related to functional status, cognitive ability or social support system  Outcome: Progressing     Problem: GASTROINTESTINAL - ADULT  Goal: Maintains or returns to baseline bowel function  Description: INTERVENTIONS:  - Assess bowel function  - Maintain adequate hydration with IV or PO as ordered and tolerated  - Evaluate effectiveness of GI medications  - Encourage mobilization and activity  - Obtain nutritional consult as needed  - Establish a toileting routine/schedule  - Consider collaborating with pharmacy to review patient's medication profile  Outcome: Progressing

## 2024-04-09 NOTE — DIETARY NOTE
[facility]   part of [facility]   CLINICAL NUTRITION    Libertad Becerra     Admitting diagnosis:  HIATEL HERNIA    Ht: 158.8 cm (5' 2.5\")  Wt: 69.2 kg (152 lb 9.6 oz).   Body mass index is 27.47 kg/m².    Wt Readings from Last 6 Encounters:   04/08/24 69.2 kg (152 lb 9.6 oz)   03/06/24 69.9 kg (154 lb)   03/05/24 69.8 kg (153 lb 12.8 oz)   10/25/23 70.8 kg (156 lb)   07/13/23 70.8 kg (156 lb)   06/13/23 70.6 kg (155 lb 9.6 oz)      Labs/Meds reviewed    Diet:       Procedures    Clear liquid diet Is Patient on Accuchecks? No     Percent Meals Eaten (last 3 days)       Date/Time Percent Meals Eaten (%)    04/08/24 1257 0 %    04/08/24 1756 0 %    04/09/24 0500 0 %          Pt chart reviewed d/t consult for full liquids and soft/ low fiber. RD provided education and handout for full liquid and soft/ low fiber diet. Discussed food groups; what to include and avoid. Pt receptive and expect compliance. All questions answered and contact provided. RD available prn.     Patient reports fair appetite at this time. Diet just advanced to CLD.   Nursing notes reports Percent Meals Eaten (%): 0 % intake for last meal.  Tolerating po diet without diarrhea, emesis, or constipation.   No significant weight changes noted.     Patient is at low nutrition risk at this time.    Please consult if patient status changes or nutrition issues arise.    Ebonie Pretty RD, LDN  Clinical Dietitian

## 2024-04-10 ENCOUNTER — TELEPHONE (OUTPATIENT)
Dept: INTERNAL MEDICINE CLINIC | Facility: CLINIC | Age: 74
End: 2024-04-10

## 2024-04-10 ENCOUNTER — PATIENT OUTREACH (OUTPATIENT)
Dept: CASE MANAGEMENT | Age: 74
End: 2024-04-10

## 2024-04-10 DIAGNOSIS — K44.9 HIATAL HERNIA: Primary | ICD-10-CM

## 2024-04-10 DIAGNOSIS — Z02.9 ENCOUNTERS FOR ADMINISTRATIVE PURPOSE: ICD-10-CM

## 2024-04-10 PROCEDURE — 1111F DSCHRG MED/CURRENT MED MERGE: CPT

## 2024-04-10 PROCEDURE — 1159F MED LIST DOCD IN RCRD: CPT

## 2024-04-10 RX ORDER — ACETAMINOPHEN 325 MG/1
325 TABLET ORAL EVERY 6 HOURS PRN
COMMUNITY

## 2024-04-10 NOTE — PROGRESS NOTES
Initial Post Discharge Follow Up   Discharge Date: 4/9/24  Contact Date: 4/10/2024    Consent Verification:  Assessment Completed With: Patient  HIPAA Verified?  Yes    Discharge Dx:   Hiatal hernia     General:   How have you been since your discharge from the hospital?  I'm doing really good. Pleasantly surprised I haven't had to take any of the strong pain medication they prescribed me.   Do you have any pain since discharge?  Yes  Where: chest-only when pt coughed or burped yesterday but none today   Rating on pain scale 1-10, 10 being the worst pain you have ever experienced, what is current pain: 1  Alleviating factors: none  Aggravating factors: cough or burp  Is the pain manageable at home? Yes  How well was your pain managed while in the hospital?   On a scale of 1-5   1- Very Poor and 5- Very well   Very Well  When you were leaving the hospital were your discharge instructions reviewed with you? Yes  How well were your discharge instructions explained to you?   On a scale of 1-5   1- Very Poor and 5- Very well   Very Well  Do you have any questions about your discharge instructions?  No  Before leaving the hospital was your diagnoses explained to you? Yes  Do you have any questions about your diagnoses? No  Are you able to perform normal daily activities of living as you have prior to your hospital stay (dressing, bathing, ambulating to the bathroom, etc)? yes  (NCM) Was patient given a different diet per AVS? yes  If so, which diet?  Full liquid  Are there any barriers to following this diet? no    Medications:   Current Outpatient Medications   Medication Sig Dispense Refill    HYDROcodone-acetaminophen 7.5-325 MG/15ML Oral Solution Take 10 mL by mouth every 4 (four) hours as needed. 300 mL 0    Naloxone HCl 4 MG/0.1ML Nasal Liquid 4 mg by Nasal route as needed. If patient remains unresponsive, repeat dose in other nostril 2-5 minutes after first dose. 1 kit 0    LOSARTAN 50 MG Oral Tab TAKE 1 TABLET  EVERY DAY 90 tablet 3    PANTOPRAZOLE 40 MG Oral Tab EC TAKE 1 TABLET BEFORE BREAKFAST 90 tablet 3    Cholecalciferol (VITAMIN D) 1000 units Oral Tab Take 2,000 Units by mouth daily.       Were there any changes to your current medication(s) noted on the AVS? Yes  If so, were these medication changes discussed with you prior to leaving the hospital? Yes  If a new medication was prescribed:    Was the new medication's purpose & side effects reviewed? Yes  Do you have any questions about your new medication? No  Did you  your discharge medications when you left the hospital?  Pt states that she doesn't need the Norco.   Let's go over your medications together to make sure we are not missing anything. Medications Reviewed  Are there any reasons that keep you from taking your medication as prescribed? No  Are you having any concerns with constipation? No      Discharge medications reviewed/discussed/and reconciled against outpatient medications with patient.  Any changes or updates to medications sent to PCP.  Patient Acknowledged     Referrals/orders at D/C:  Referrals/orders placed at D/C? no    DME ordered at D/C? No      Discharge orders, AVS reviewed and discussed with patient. Any changes or updates to orders sent to PCP.  Patient Acknowledged      SDOH:   Transportation Needs: No Transportation Needs (4/8/2024)    Transportation Needs     Lack of Transportation: No     Financial Resource Strain: Low Risk  (4/10/2024)    Financial Resource Strain     Difficulty of Paying Living Expenses: Not hard at all     Med Affordability: No         Follow up appointments:      Your appointments       Date & Time Appointment Department (Center)    Apr 17, 2024 12:30 PM CDT HEMATOLOGY ONCOLOGY FOLLOW UP with CHAPO SANCHEZ [facility] in [place] ([facility])              [facility] in [place]  [address]  [facility] [address]  [phone]            TCC  Was TCC ordered: No      PCP (If no TCC appointment)  Does patient already have a PCP appointment scheduled? No  NCM Attempted to schedule PCP office TCM appointment with patient   If no appointment scheduled: Explain-pt states that she is following up with the surgeon; NCM sent TE to PCP Office.     Specialist    Does the patient have any other follow up appointment(s) needing to be scheduled? Yes  If yes: NCM reviewed upcoming specialist appointment with patient: Yes  Does the patient need assistance scheduling appointment(s): No, pt already has post op appt in 4/17/24    Is there any reason as to why you cannot make your appointment(s)?  No     Needs post D/C:   Now that you are home, are there any needs or concerns you need addressed before your next visit with your PCP?  (DME, meds, questions, etc.): No    Interventions by NCM:   NCM reviewed discharge instructions and when to seek medical attention with the patient. She states that she is doing surpisingly well. She had minimal pain yesterday but only when she coughed or burped and she has not had any today. NCM instructed on s/s of infection; she v/u. She denied having any fever, n/v/c/d, sob, lightheadedness, HA or any new or worsening symptoms. Med review completed. She denied having any questions or concerns at this time.         CCM referral placed:    No    BOOK BY DATE: 4/23/24

## 2024-04-10 NOTE — TELEPHONE ENCOUNTER
TAMIKA, Spoke to patient for TCM today.  Patient states that she is doing well and does not need a follow up with PCP at this time as she is seeing surgeon on 4/17/23.  TCM appointment recommended by 4/23/24 as patient is a Moderate risk for readmission.      BOOK BY DATE (last date for TCM): 4/23/24         Melolabial Interpolation Flap Text: A decision was made to reconstruct the defect utilizing an interpolation axial flap and a staged reconstruction.  A telfa template was made of the defect.  This telfa template was then used to outline the melolabial interpolation flap.  The donor area for the pedicle flap was then injected with anesthesia.  The flap was excised through the skin and subcutaneous tissue down to the layer of the underlying musculature.  The pedicle flap was carefully excised within this deep plane to maintain its blood supply.  The edges of the donor site were undermined.   The donor site was closed in a primary fashion.  The pedicle was then rotated into position and sutured.  Once the tube was sutured into place, adequate blood supply was confirmed with blanching and refill.  The pedicle was then wrapped with xeroform gauze and dressed appropriately with a telfa and gauze bandage to ensure continued blood supply and protect the attached pedicle.

## 2024-04-10 NOTE — PAYOR COMM NOTE
--------------  DISCHARGE REVIEW    Payor: ALONDRA YAN [facility]  Subscriber #:  [id number]  Authorization Number: [id number]    Admit date: 24  Admit time:   5:30 AM  Discharge Date: 2024  6:30 PM     Admitting Physician: Gregorio Powell Jr., MD  Attending Physician:  No att. providers found  Primary Care Physician: Tash Egan MD          Discharge Summary Notes        Discharge Summary signed by Domenica Mayorga PA-C at 2024  4:27 PM       Author: Domenica Mayorga PA-C Specialty: Physician Assistant Surgical, CARDIOLOGY Author Type: Physician Assistant    Filed: 2024  4:27 PM Date of Service: 2024  4:27 PM Status: Signed    : Domenica Mayorga PA-C (Physician Assistant)           [facility]   part of [facility]    Discharge Summary    Libertad Becerra Patient Status:  Inpatient    1950 MRN [id number]   Location [facility] 8NE-A Attending Gregorio Powell Jr., MD   Hosp Day # 1 PCP Tash Egan MD     Date of Admission: 2024 Disposition: Home or Self Care     Date of Discharge: 2024    Admitting Diagnosis: HIATEL HERNIA    Discharge Diagnosis:   Patient Active Problem List   Diagnosis    Other hyperlipidemia    Environmental allergies    History of cervical cancer    Benign essential HTN    DDD (degenerative disc disease), lumbar    Cervical radiculopathy    Gastroesophageal reflux disease    Dense breast tissue on mammogram    Dysphagia, unspecified    History of adenomatous polyp of colon    Family history of colon cancer    Hyperlipidemia    Primary hypertension    Hiatal hernia       Reason for Admission: HIATEL HERNIA    Physical Exam:  General: well appearing female in no acute distress  HEENT: Normocephalic, PERRL, EOMI, no scleral icterus  Heart: regular rate and rhythm. No lower extremity edema.  Lungs: Normal respiratory effort. Equal chest rise bilaterally.    Abdomen: Soft, non-distended. Wounds clean, dry and intact.  No erythema,  swelling, drainage or other signs of infection  Extremities: No clubbing or cyanosis. No lateralizing weakness  Neuro: No gross cranial nerve defects, no loss of sensation  Psych: oriented to person place and time, normal mood and affect     History of Present Illness: Patient is a 73 year old female admitted for surgery for hiatal hernia on 4/8/2024    Hospital Course: Patient underwent surgery on 4/8/2024. She did well post-operatively. After an initial stay in the PACU, she went to the general floor. There she was able to get up and move around on their own. On POD1, UGI was negative and patient was advanced to a clear liquid diet. Prior to discharge, patient was tolerating a clear liquid diet, pain was controlled, and they were sent home in good condition.    Final Pathology: pending    Consultations: Internal Medicine    Procedures: ROBOTIC ASSISTED HIATAL HERNIA REPAIR AND  FUNDOPLICATION    Complications: none    Discharge Condition: Good    Discharge Medications:      Discharge Medications        START taking these medications        Instructions Prescription details   HYDROcodone-acetaminophen 7.5-325 MG/15ML Soln      Take 10 mL by mouth every 4 (four) hours as needed.   Quantity: 300 mL  Refills: 0     Naloxone HCl 4 MG/0.1ML Liqd      4 mg by Nasal route as needed. If patient remains unresponsive, repeat dose in other nostril 2-5 minutes after first dose.   Quantity: 1 kit  Refills: 0            CONTINUE taking these medications        Instructions Prescription details   losartan 50 MG Tabs  Commonly known as: Cozaar      TAKE 1 TABLET EVERY DAY   Quantity: 90 tablet  Refills: 3     pantoprazole 40 MG Tbec  Commonly known as: Protonix      TAKE 1 TABLET BEFORE BREAKFAST   Quantity: 90 tablet  Refills: 3     Vitamin D 1000 units Tabs      Take 2,000 Units by mouth daily.   Refills: 0               Where to Get Your Medications        These medications were sent to [id number] - [place], [place] - [address] [phone], [phone]  [address], [facility] [address]      Phone: [phone]   HYDROcodone-acetaminophen 7.5-325 MG/15ML Soln  Naloxone HCl 4 MG/0.1ML Liqd         Follow up Visits: Follow-up with Dr. Powell in 1-2 weeks.     Other Discharge Instructions: see separate d/c instructions    Time Spent: 35 minutes    Domenica Mayorga PA-C  4/9/2024      Electronically signed by Domenica Mayorga PA-C on 4/9/2024  4:27 PM         REVIEWER COMMENTS

## 2024-04-17 ENCOUNTER — OFFICE VISIT (OUTPATIENT)
Dept: HEMATOLOGY/ONCOLOGY | Facility: HOSPITAL | Age: 74
End: 2024-04-17
Attending: THORACIC SURGERY (CARDIOTHORACIC VASCULAR SURGERY)
Payer: MEDICARE

## 2024-04-17 VITALS
DIASTOLIC BLOOD PRESSURE: 83 MMHG | WEIGHT: 149.63 LBS | SYSTOLIC BLOOD PRESSURE: 171 MMHG | RESPIRATION RATE: 16 BRPM | HEART RATE: 79 BPM | TEMPERATURE: 97 F | OXYGEN SATURATION: 96 % | BODY MASS INDEX: 27 KG/M2

## 2024-04-17 DIAGNOSIS — K44.9 HIATAL HERNIA: Primary | ICD-10-CM

## 2024-04-17 PROCEDURE — 99211 OFF/OP EST MAY X REQ PHY/QHP: CPT

## 2024-04-17 NOTE — PROGRESS NOTES
Thoracic Surgery Post-Op Progress Note    Ms. Becerra is a 73 year old female who presents today in follow up after a robotic assisted hiatal hernia repair and fundoplication performed on 4/8/24. She is doing well. She complains of nothing. She denies fevers or chills. No cough. No hemoptysis. No shortness of breath. No worsening pain, swelling, or drainage from wounds. She is tolerating a full liquid diet without dysphagia, nausea, or reflux symptoms. She occasionally notes dysphagia if she \"doesn't crush up a pill well enough\".  She has been staying active and using incentive spirometry at home.    PMH:  Past Medical History:    Abdominal hernia    Abdominal pain    Arthritis    Back pain    Back problem    Belching    Body piercing    CANCER    cervical cancer-partial hysterectomy    cataract s/p surgery    Chest pain    Fatigue    Flatulence/gas pain/belching    Frequent urination    Heartburn    Hemorrhoids    High blood pressure    High cholesterol    History of blood transfusion    FEW YEARS AGO    Indigestion    Leaking of urine    Night sweats    Pain in joints    Problems with swallowing    Shortness of breath    Sleep disturbance    Uncomfortable fullness after meals    Wears glasses    Weight loss       Meds:    Current Outpatient Medications:     acetaminophen 325 MG Oral Tab, Take 1 tablet (325 mg total) by mouth every 6 (six) hours as needed for Pain., Disp: , Rfl:     HYDROcodone-acetaminophen 7.5-325 MG/15ML Oral Solution, Take 10 mL by mouth every 4 (four) hours as needed. (Patient not taking: Reported on 4/10/2024), Disp: 300 mL, Rfl: 0    Naloxone HCl 4 MG/0.1ML Nasal Liquid, 4 mg by Nasal route as needed. If patient remains unresponsive, repeat dose in other nostril 2-5 minutes after first dose. (Patient not taking: Reported on 4/10/2024), Disp: 1 kit, Rfl: 0    LOSARTAN 50 MG Oral Tab, TAKE 1 TABLET EVERY DAY, Disp: 90 tablet, Rfl: 3    PANTOPRAZOLE 40 MG Oral Tab EC, TAKE 1 TABLET BEFORE  BREAKFAST, Disp: 90 tablet, Rfl: 3    Cholecalciferol (VITAMIN D) 1000 units Oral Tab, Take 2,000 Units by mouth daily., Disp: , Rfl:     Vital Signs:    BP (!) 171/83 (BP Location: Left arm, Patient Position: Sitting, Cuff Size: adult)   Pulse 79   Temp 96.9 °F (36.1 °C) (Temporal)   Resp 16   Wt 67.9 kg (149 lb 9.6 oz)   SpO2 96%   BMI 26.93 kg/m²     Physical Exam:    General: well appearing female in no acute distress  HEENT: Normocephalic, PERRL, EOMI  Heart: regular rate and rhythm. No murmurs, rubs or gallops. No lower extremity edema.  Lungs: Normal respiratory effort. Clear to ascultation bilaterally.  Abdomen: Soft, Non-tender, non-distended. incisions are clean, dry and intact. No signs of infection.   Extremities: No clubbing or cyanosis. No lateralizing weakness  Neuro: No gross cranial nerve defects, no loss of sensation  Psych: oriented to person place and time, normal mood and affect    Pathology:  Final Diagnosis:   \"Hernia sac\", excision:  -Fibrovascular and adipose tissue, partially lined by mesothelium, consistent with hernia sac.     Assesment:    Ms. Becerra is a 73 year old female who presents today in follow up after a robotic assisted hiatal hernia repair and fundoplication performed on April 8th, 2024 for a large, symptomatic hiatal hernia. She is doing quite well. Ms. Becerra has no apparent complications from the surgery. She is happy that her shortness of breath has improved. We will continue to follow her as needed in the postoperative period. I have advanced Ms. Becerra to a soft diet. She is to follow up again with me in one month.     Plan:  Encouraged continued exercise and incentive spirometer use.  No heavy lifting, pushing or pulling for one month after surgery.   Advance to a soft diet   Follow up again in one month    Gregorio Powell M.D.  Thoracic Surgery  Pager: [phone]

## 2024-05-12 ENCOUNTER — HOSPITAL ENCOUNTER (INPATIENT)
Facility: HOSPITAL | Age: 74
LOS: 3 days | Discharge: HOME | DRG: 920 | End: 2024-05-15
Attending: EMERGENCY MEDICINE | Admitting: HOSPITALIST

## 2024-05-12 ENCOUNTER — APPOINTMENT (OUTPATIENT)
Dept: CT IMAGING | Facility: HOSPITAL | Age: 74
DRG: 920 | End: 2024-05-12
Attending: EMERGENCY MEDICINE

## 2024-05-12 ENCOUNTER — APPOINTMENT (OUTPATIENT)
Dept: GENERAL RADIOLOGY | Facility: HOSPITAL | Age: 74
DRG: 920 | End: 2024-05-12
Attending: EMERGENCY MEDICINE

## 2024-05-12 DIAGNOSIS — A41.9: Primary | ICD-10-CM

## 2024-05-12 DIAGNOSIS — R10.9 ABDOMINAL PAIN OF UNKNOWN ETIOLOGY: ICD-10-CM

## 2024-05-12 PROBLEM — E87.6 HYPOKALEMIA: Status: ACTIVE | Noted: 2024-05-12

## 2024-05-12 PROBLEM — E87.0 HYPERNATREMIA: Status: ACTIVE | Noted: 2024-05-12

## 2024-05-12 PROBLEM — E87.20 LACTIC ACID ACIDOSIS: Status: ACTIVE | Noted: 2024-05-12

## 2024-05-12 PROBLEM — T68.XXXA HYPOTHERMIA: Status: ACTIVE | Noted: 2024-05-12

## 2024-05-12 PROBLEM — R73.9 HYPERGLYCEMIA: Status: ACTIVE | Noted: 2024-05-12

## 2024-05-12 LAB
ALBUMIN SERPL-MCNC: 3.9 G/DL (ref 3.4–5)
ALBUMIN/GLOB SERPL: 1.1 {RATIO} (ref 1–2)
ALP LIVER SERPL-CCNC: 79 U/L
ALT SERPL-CCNC: 15 U/L
ANION GAP SERPL CALC-SCNC: 6 MMOL/L (ref 0–18)
AST SERPL-CCNC: 19 U/L (ref 15–37)
ATRIAL RATE: 45 BPM
BASOPHILS # BLD AUTO: 0.04 X10(3) UL (ref 0–0.2)
BASOPHILS NFR BLD AUTO: 0.5 %
BILIRUB SERPL-MCNC: 0.5 MG/DL (ref 0.1–2)
BILIRUB UR QL STRIP.AUTO: NEGATIVE
BUN BLD-MCNC: 11 MG/DL (ref 9–23)
CALCIUM BLD-MCNC: 9.3 MG/DL (ref 8.5–10.1)
CHLORIDE SERPL-SCNC: 112 MMOL/L (ref 98–112)
CLARITY UR REFRACT.AUTO: CLEAR
CO2 SERPL-SCNC: 27 MMOL/L (ref 21–32)
COLOR UR AUTO: COLORLESS
CREAT BLD-MCNC: 0.72 MG/DL
EGFRCR SERPLBLD CKD-EPI 2021: 88 ML/MIN/1.73M2 (ref 60–?)
EOSINOPHIL # BLD AUTO: 0.24 X10(3) UL (ref 0–0.7)
EOSINOPHIL NFR BLD AUTO: 3.3 %
ERYTHROCYTE [DISTWIDTH] IN BLOOD BY AUTOMATED COUNT: 13.2 %
GLOBULIN PLAS-MCNC: 3.5 G/DL (ref 2.8–4.4)
GLUCOSE BLD-MCNC: 142 MG/DL (ref 70–99)
GLUCOSE UR STRIP.AUTO-MCNC: NORMAL MG/DL
HCT VFR BLD AUTO: 43.1 %
HGB BLD-MCNC: 14.5 G/DL
IMM GRANULOCYTES # BLD AUTO: 0.01 X10(3) UL (ref 0–1)
IMM GRANULOCYTES NFR BLD: 0.1 %
KETONES UR STRIP.AUTO-MCNC: NEGATIVE MG/DL
LACTATE SERPL-SCNC: 1.1 MMOL/L (ref 0.4–2)
LACTATE SERPL-SCNC: 1.2 MMOL/L (ref 0.4–2)
LACTATE SERPL-SCNC: 2.6 MMOL/L (ref 0.4–2)
LEUKOCYTE ESTERASE UR QL STRIP.AUTO: NEGATIVE
LIPASE SERPL-CCNC: 34 U/L (ref 13–75)
LYMPHOCYTES # BLD AUTO: 2.38 X10(3) UL (ref 1–4)
LYMPHOCYTES NFR BLD AUTO: 32.5 %
MCH RBC QN AUTO: 30.9 PG (ref 26–34)
MCHC RBC AUTO-ENTMCNC: 33.6 G/DL (ref 31–37)
MCV RBC AUTO: 91.7 FL
MONOCYTES # BLD AUTO: 0.55 X10(3) UL (ref 0.1–1)
MONOCYTES NFR BLD AUTO: 7.5 %
NEUTROPHILS # BLD AUTO: 4.11 X10 (3) UL (ref 1.5–7.7)
NEUTROPHILS # BLD AUTO: 4.11 X10(3) UL (ref 1.5–7.7)
NEUTROPHILS NFR BLD AUTO: 56.1 %
NITRITE UR QL STRIP.AUTO: NEGATIVE
OSMOLALITY SERPL CALC.SUM OF ELEC: 302 MOSM/KG (ref 275–295)
P AXIS: 40 DEGREES
P-R INTERVAL: 170 MS
PH UR STRIP.AUTO: 7 [PH] (ref 5–8)
PLATELET # BLD AUTO: 259 10(3)UL (ref 150–450)
POTASSIUM SERPL-SCNC: 3.4 MMOL/L (ref 3.5–5.1)
PROT SERPL-MCNC: 7.4 G/DL (ref 6.4–8.2)
PROT UR STRIP.AUTO-MCNC: NEGATIVE MG/DL
Q-T INTERVAL: 540 MS
QRS DURATION: 82 MS
QTC CALCULATION (BEZET): 467 MS
R AXIS: 5 DEGREES
RBC # BLD AUTO: 4.7 X10(6)UL
RBC UR QL AUTO: NEGATIVE
SODIUM SERPL-SCNC: 145 MMOL/L (ref 136–145)
SP GR UR STRIP.AUTO: >1.03 (ref 1–1.03)
T AXIS: 57 DEGREES
TROPONIN I SERPL HS-MCNC: <3 NG/L
UROBILINOGEN UR STRIP.AUTO-MCNC: NORMAL MG/DL
VENTRICULAR RATE: 45 BPM
WBC # BLD AUTO: 7.3 X10(3) UL (ref 4–11)

## 2024-05-12 PROCEDURE — 99223 1ST HOSP IP/OBS HIGH 75: CPT | Performed by: HOSPITALIST

## 2024-05-12 PROCEDURE — 71045 X-RAY EXAM CHEST 1 VIEW: CPT | Performed by: EMERGENCY MEDICINE

## 2024-05-12 PROCEDURE — 74177 CT ABD & PELVIS W/CONTRAST: CPT | Performed by: EMERGENCY MEDICINE

## 2024-05-12 RX ORDER — ACETAMINOPHEN 500 MG
500 TABLET ORAL EVERY 4 HOURS PRN
Status: DISCONTINUED | OUTPATIENT
Start: 2024-05-12 | End: 2024-05-13

## 2024-05-12 RX ORDER — ONDANSETRON 2 MG/ML
4 INJECTION INTRAMUSCULAR; INTRAVENOUS EVERY 4 HOURS PRN
Status: ACTIVE | OUTPATIENT
Start: 2024-05-12 | End: 2024-05-12

## 2024-05-12 RX ORDER — BISACODYL 10 MG
10 SUPPOSITORY, RECTAL RECTAL
Status: DISCONTINUED | OUTPATIENT
Start: 2024-05-12 | End: 2024-05-15

## 2024-05-12 RX ORDER — SODIUM CHLORIDE, SODIUM LACTATE, POTASSIUM CHLORIDE, CALCIUM CHLORIDE 600; 310; 30; 20 MG/100ML; MG/100ML; MG/100ML; MG/100ML
INJECTION, SOLUTION INTRAVENOUS CONTINUOUS
Status: DISCONTINUED | OUTPATIENT
Start: 2024-05-12 | End: 2024-05-15

## 2024-05-12 RX ORDER — ONDANSETRON 2 MG/ML
8 INJECTION INTRAMUSCULAR; INTRAVENOUS EVERY 6 HOURS PRN
Status: DISCONTINUED | OUTPATIENT
Start: 2024-05-12 | End: 2024-05-15

## 2024-05-12 RX ORDER — MELATONIN
3 NIGHTLY PRN
Status: DISCONTINUED | OUTPATIENT
Start: 2024-05-12 | End: 2024-05-15

## 2024-05-12 RX ORDER — HYDROMORPHONE HYDROCHLORIDE 1 MG/ML
0.5 INJECTION, SOLUTION INTRAMUSCULAR; INTRAVENOUS; SUBCUTANEOUS EVERY 30 MIN PRN
Status: ACTIVE | OUTPATIENT
Start: 2024-05-12 | End: 2024-05-12

## 2024-05-12 RX ORDER — HYDROMORPHONE HYDROCHLORIDE 1 MG/ML
0.5 INJECTION, SOLUTION INTRAMUSCULAR; INTRAVENOUS; SUBCUTANEOUS EVERY 30 MIN PRN
Status: DISCONTINUED | OUTPATIENT
Start: 2024-05-12 | End: 2024-05-15

## 2024-05-12 RX ORDER — BENZONATATE 100 MG/1
200 CAPSULE ORAL 3 TIMES DAILY PRN
Status: DISCONTINUED | OUTPATIENT
Start: 2024-05-12 | End: 2024-05-15

## 2024-05-12 RX ORDER — ENOXAPARIN SODIUM 100 MG/ML
40 INJECTION SUBCUTANEOUS NIGHTLY
Status: DISCONTINUED | OUTPATIENT
Start: 2024-05-12 | End: 2024-05-15

## 2024-05-12 RX ORDER — ECHINACEA PURPUREA EXTRACT 125 MG
1 TABLET ORAL
Status: DISCONTINUED | OUTPATIENT
Start: 2024-05-12 | End: 2024-05-15

## 2024-05-12 RX ORDER — ONDANSETRON 2 MG/ML
4 INJECTION INTRAMUSCULAR; INTRAVENOUS ONCE
Status: COMPLETED | OUTPATIENT
Start: 2024-05-12 | End: 2024-05-13

## 2024-05-12 RX ORDER — SENNOSIDES 8.6 MG
17.2 TABLET ORAL NIGHTLY PRN
Status: DISCONTINUED | OUTPATIENT
Start: 2024-05-12 | End: 2024-05-15

## 2024-05-12 RX ORDER — POLYETHYLENE GLYCOL 3350 17 G/17G
17 POWDER, FOR SOLUTION ORAL DAILY PRN
Status: DISCONTINUED | OUTPATIENT
Start: 2024-05-12 | End: 2024-05-15

## 2024-05-12 RX ORDER — SODIUM CHLORIDE 9 MG/ML
INJECTION, SOLUTION INTRAVENOUS CONTINUOUS
Status: ACTIVE | OUTPATIENT
Start: 2024-05-12 | End: 2024-05-12

## 2024-05-12 NOTE — ED QUICK NOTES
Orders for admission, patient is aware of plan and ready to go upstairs. Any questions, please call ED RN Cameron at extension [phone].     Patient Covid vaccination status: Unvaccinated     COVID Test Ordered in ED: None    COVID Suspicion at Admission: N/A    Running Infusions:  None    Mental Status/LOC at time of transport: Alert    Other pertinent information:   CIWA score: N/A   NIH score:  N/A

## 2024-05-12 NOTE — ED QUICK NOTES
Unable to collect both sets of blood cultures. We were only able to get the aerobic bottle. Three different RNs tried 6 times. Phlebotomy was paged and never responded. MD notified and gave the order to send what we have and start the antibiotic.

## 2024-05-12 NOTE — PROGRESS NOTES
NURSING ADMISSION NOTE      Patient admitted via Cart.   Oriented to room.  Safety precautions initiated.  Bed in low position.  Call light in reach.

## 2024-05-12 NOTE — ED PROVIDER NOTES
Patient Seen in: [facility] Emergency Department      History     Chief Complaint   Patient presents with    Pain     Stated Complaint: dx hiatal hernia 1 mo ago, fine until now, extreme pain, sweating, ABD pain and*    Subjective:   HPI    73-year-old female who presents to the emergency department with her  due to report of abdominal pain.  The pain began in her epigastric and right upper quadrant at approximately 2 AM.  Woke her from sleeping.  She has had rigors and shakes.  She has had chills.  She has had 2 episodes of emesis.  Reviewing her record she was diagnosed with a hiatal hernia on 4/8/2024 and had a robotic assisted hiatal hernia repair and fundoplication.  She has not taken any medication for pain control.  Pain came on suddenly.  She last ate 11 hours ago.    Objective:   Past Medical History:    Abdominal hernia    Abdominal pain    Arthritis    Back pain    Back problem    Belching    Body piercing    CANCER    cervical cancer-partial hysterectomy    cataract s/p surgery    Chest pain    Fatigue    Flatulence/gas pain/belching    Frequent urination    Heartburn    Hemorrhoids    Hiatal hernia    High blood pressure    High cholesterol    History of blood transfusion    FEW YEARS AGO    Indigestion    Leaking of urine    Night sweats    Pain in joints    Problems with swallowing    Shortness of breath    Sleep disturbance    Uncomfortable fullness after meals    Wears glasses    Weight loss              Past Surgical History:   Procedure Laterality Date    Cataract      Colonoscopy  11/13/2014    Colonoscopy      Egd      Hysterectomy      partial hysterec    Other surgical history  04/08/2024    robotic assisted hiatal hernia repair and fundoplication by Dr. Powell    Sigmoidoscopy,diagnostic                  Social History     Socioeconomic History    Marital status:    Tobacco Use    Smoking status: Never    Smokeless tobacco: Never   Vaping Use    Vaping status: Never  Used   Substance and Sexual Activity    Alcohol use: No     Alcohol/week: 0.0 standard drinks of alcohol    Drug use: No    Sexual activity: Never   Other Topics Concern    Caffeine Concern No    Exercise No     Comment: biking    Seat Belt Yes     Social Determinants of Health     Financial Resource Strain: Low Risk  (4/10/2024)    Financial Resource Strain     Difficulty of Paying Living Expenses: Not hard at all     Med Affordability: No   Food Insecurity: No Food Insecurity (4/8/2024)    Food Insecurity     Food Insecurity: Never true   Transportation Needs: No Transportation Needs (4/8/2024)    Transportation Needs     Lack of Transportation: No   Housing Stability: Low Risk  (4/8/2024)    Housing Stability     Housing Instability: No              Review of Systems    Positive for stated complaint: dx hiatal hernia 1 mo ago, fine until now, extreme pain, sweating, ABD pain and*  Other systems are as noted in HPI.  Constitutional and vital signs reviewed.      All other systems reviewed and negative except as noted above.    Physical Exam     ED Triage Vitals   BP 05/12/24 0441 (!) 163/66   Pulse 05/12/24 0441 (!) 45   Resp 05/12/24 0441 15   Temp 05/12/24 0459 (!) 94.4 °F (34.7 °C)   Temp src 05/12/24 0459 Oral   SpO2 05/12/24 0441 100 %   O2 Device 05/12/24 0441 None (Room air)       Current Vitals:   Vital Signs  BP: 130/65  Pulse: 57  Resp: 15  Temp: 97.6 °F (36.4 °C)  Temp src: Oral  MAP (mmHg): 81    Oxygen Therapy  SpO2: 97 %  O2 Device: None (Room air)            Physical Exam  General: 73-year-old female who appears ill at this time and is having rigors.  Complaining of discomfort in the epigastrium.  HEENT: Mucosa is dry tongue is midline.  Pupils are reactive to light.  No scleral icterus or conjunctival pallor.  Lungs: Equal breath sounds bilaterally no wheezes or rhonchi.  Cardiac: Heart rate of 50 normal S1 and S2 without murmurs or ectopy  Abdomen: There is some epigastric discomfort on palpation.   No distention of the abdomen.  Voluntary guarding is noted.  Extremities: Moving all 4 without difficulty no deformities.  ED Course     Labs Reviewed   COMP METABOLIC PANEL (14) - Abnormal; Notable for the following components:       Result Value    Glucose 142 (*)     Potassium 3.4 (*)     Calculated Osmolality 302 (*)     All other components within normal limits   LACTIC ACID, PLASMA - Abnormal; Notable for the following components:    Lactic Acid 2.6 (*)     All other components within normal limits   URINALYSIS WITH CULTURE REFLEX - Abnormal; Notable for the following components:    Urine Color Colorless (*)     Spec Gravity >1.030 (*)     All other components within normal limits   LIPASE - Normal   TROPONIN I HIGH SENSITIVITY - Normal   CBC WITH DIFFERENTIAL WITH PLATELET    Narrative:     The following orders were created for panel order CBC With Differential With Platelet.  Procedure                               Abnormality         Status                     ---------                               -----------         ------                     CBC W/ DIFFERENTIAL[[id number]]                              Final result                 Please view results for these tests on the individual orders.   LACTIC ACID REFLEX POST POSTIVE   RAINBOW DRAW BLUE   RAINBOW DRAW GOLD   BLOOD CULTURE   BLOOD CULTURE   CBC W/ DIFFERENTIAL     EKG    Rate, intervals and axes as noted on EKG Report.  Rate: 45  Rhythm: Sinus Rhythm  Reading: Sinus bradycardia otherwise normal EKG            Narrative  PROCEDURE:  XR CHEST AP PORTABLE  (CPT=71045)     TECHNIQUE:  AP chest radiograph was obtained.     COMPARISON:  VINICIUS Augustin, XR RIBS WITH CHEST (3 VIEWS), LEFT  (CPT=71101), 6/13/2023, 4:25 PM.     INDICATIONS:  dx hiatal hernia 1 mo ago, fine until now, extreme pain, sweating     PATIENT STATED HISTORY: (As transcribed by Technologist)  Patient complains of pain at hiatal hernia area that she had surgery on last month.                       Impression  CONCLUSION:    Borderline heart size.  Left lower lobe opacity retrocardiac medially may reflect component of the hiatal hernia which was present on prior CT appears less conspicuous currently, versus possible pneumonia or atelectasis.  The right chest  appears clear.  No pneumothorax or sizable effusion.     LOCATION:  [facility]                 Dictated by (CST): Adan Yepez MD on 5/12/2024 at 7:18 AM      Finalized by (CST): Adan Yeepz MD on 5/12/2024 at 7:19 AM     Narrative  PROCEDURE:  CT ABDOMEN+PELVIS (CONTRAST ONLY) (CPT=74177)     COMPARISON:  None.     INDICATIONS:  dx hiatal hernia 1 mo ago, fine until now, extreme pain, sweating, ABD pain and nausea     TECHNIQUE:  CT scanning was performed from the dome of the diaphragm to the pubic symphysis with non-ionic intravenous contrast material. Post contrast coronal MPR imaging was performed.  Dose reduction techniques were used. Dose information is  transmitted to the ACR (American College of Radiology) NRDR (National Radiology Data Registry) which includes the Dose Index Registry.     PATIENT STATED HISTORY:(As transcribed by Technologist)  Right upper quadrant pain , nausea, diaphoretic, recent Keagan fundoplication.      CONTRAST USED:  80cc of Isovue 370     FINDINGS:          LIVER:  Unremarkable.     BILIARY:  Unremarkable.     PANCREAS:  Unremarkable.     SPLEEN:  Unremarkable.     KIDNEYS:  No acute abnormality.     ADRENALS:  Unremarkable.     AORTA/VASCULAR:  No aortic aneurysm. There are atherosclerotic changes including calcification involving the aorta, but no evidence for aneurysm involving the aorta.     RETROPERITONEUM:  Unremarkable.     BOWEL/MESENTERY:  Nissen fundoplication.  Moderate gastric fluid and air.  Moderate fluid within small bowel especially noted in the proximal small bowel where the bowel is mildly dilated.  The distal bowel does not appear dilated but there is no  specific transition  identified.  No free air identified.  Moderate amount of stool and air in the cecum mildly dilated, milder stool throughout the rest the colon.  No rectal fecal impaction.  No features to indicate acute colitis or diverticulitis.    Nonspecific age indeterminate mesenteric fatty stranding and small mesenteric lymph nodes.  Probable small duodenal diverticulum.  Moderate fluid the pelvis with a pocket measuring about 8.7 x 5.7 cm transverse AP dimensions respectively midline and left   of midline.  Although a thick enhancing rim is not present to indicate a definite or defined abscess, there is some suggestion of early organization with membrane formation especially along the superior left aspect of this fluid.  No large or freely  distributed generalized ascites.     ABDOMINAL WALL:  Unremarkable.     URINARY BLADDER:  Unremarkable.     LYMPH NODES PELVIS:  Unremarkable.     PELVIC ORGANS:  No acute process.     LUNG BASES:  No acute process.     BONES:  No acute abnormality. Note is made of degenerative changes present in the spine.                        Impression  CONCLUSION:       1. No free air.  Moderate size fluid in the pelvis midline and left of midline.  There is suggestion of some early organization and membrane formation with subtle enhancement at the margins of the fluid collection, could reflect developing organization  and potential development into abscess.  No gas bubbles at this time.  Close clinical follow-up, consider imaging follow-up.     2. Fundoplication.  Moderate fluid in proximal small bowel without transition, may reflect ileus, clinical follow-up to exclude developing bowel obstruction.  No free air seen.  No large or generalized ascites.          LOCATION:  [facility]        Dictated by (CST): Adan Yepez MD on 5/12/2024 at 9:51 AM      Finalized by (CST): Adan Yepez MD on 5/12/2024 at 9:56 AM             MDM    Differential diagnosis includes but is not limited to bowel  obstruction, bowel perforation, abscess, acute cholecystitis, acute pancreatitis, sepsis from urinary tract infection, Boerhaave's syndrome, cardiac ischemia, pneumothorax, pneumonia.  Admission disposition: 5/12/2024 10:27 AM       Laboratories were sent.  Lipase was normal.  Lactic acid was 2.6.  Troponin was normal.  CBC was normal.  Glucose 142 with potassium 3.4 the rest metabolic and was normal.  She received 30 cc/kg bolus of fluids she received Zosyn intravenously and Zofran for nausea control.  Chest x-ray  I reviewed the x-rays and agree with the radiologist report that showed borderline heart size left lower lobe opacity retrocardiac medially may reflect component of the hiatal hernia which was present on prior CT appears less conspicuous currently versus possible pneumonia or atelectasis.  The right chest appears clear no pneumothorax or sizable effusion.  CT scan abdomen pelvis  I reviewed the x-rays and agree with the radiologist report that showed    No free air.  Moderate sized fluid in the pelvis midline and left of midline.  There is suggestion of some early organization and membrane formation with subtle enhancement at the margins of fluid collection could reflect developing organization potential development of the abscess.  No gas bubbles at this time close clinical follow-up consider imaging follow-up.  Fundoplication moderate fluid in proximal small bowel without transition may reflect ileus.  Clinical follow-up to exclude developing bowel obstruction.  No free air seen.  No large or generalized ascites.  The patient was admitted to the hospital under the hospitalist for continued care.  The patient was seen up ambulating throughout the ER without any difficulty.  Nontoxic and well.     A total of 35 minutes of critical care time (exclusive of billable procedures) was administered to manage the patient's critical lab values due to her elevated lactic acid concerning for sepsis.  This involved  direct patient intervention, complex decision making, and/or extensive discussions with the patient, family, and clinical staff.             [facility]   part of [facility]      Sepsis Reassessment Note    /83   Pulse 60   Temp (!) 94.4 °F (34.7 °C) (Oral)   Resp 25   Ht 157.5 cm (5' 2\")   Wt 65.3 kg   SpO2 100%   BMI 26.34 kg/m²      I completed the sepsis reassessment at 1020    Cardiac:  Regularity: Regular  Rate: Normal  Heart Sounds: S1,S2    Lungs:   Right: Clear  Left: Clear    Peripheral Pulses:  Radial: Right 2+ or Left 2+      Capillary Refill:  <3 Secs    Skin:  Temp/Moisture: Warm and Dry  Color: Normal      Omar De La Cruz MD  5/12/2024  5:54 AM            Medical Decision Making      Disposition and Plan     Clinical Impression:  1. Sepsis due to undetermined organism without resultant organ failure (HCC)    2. Abdominal pain of unknown etiology         Disposition:  Admit  5/12/2024 10:27 am    Follow-up:  No follow-up provider specified.        Medications Prescribed:  Current Discharge Medication List                            Hospital Problems       Present on Admission  Date Reviewed: 5/1/2024            ICD-10-CM Noted POA    * (Principal) Sepsis due to undetermined organism without resultant organ failure (HCC) A41.9 5/12/2024 Unknown    Hyperglycemia R73.9 5/12/2024 Yes    Hypernatremia E87.0 5/12/2024 Yes    Hypokalemia E87.6 5/12/2024 Yes    Hypothermia T68.XXXA 5/12/2024 Unknown    Lactic acid acidosis E87.20 5/12/2024 Unknown

## 2024-05-12 NOTE — SPIRITUAL CARE NOTE
Spiritual Care Visit Note    Patient Name: Libertad Becerra Date of Spiritual Care Visit: 24   : 1950 Primary Dx: Sepsis due to undetermined organism without resultant organ failure (HCC)       Referred By:      Spiritual Care Taxonomy:    Intended Effects: Demonstrate caring and concern    Methods: Collaborate with care team member;Offer spiritual/Protestant support    Interventions: Identify supportive relationship(s);Prayer for healing    Visit Type/Summary:     - Spiritual Care: Responded to a request for spiritual care and assessed the patient for spiritual care needs. Consulted with RN prior to visit. Patient and family expressed appreciation for  visit. Provided support for Patient's spiritual/Protestant requests. Offered prayer.  remains available for follow up.    Spiritual Care support can be requested via an Epic consult. For urgent/immediate needs, please contact the On Call  at: [facility]: [phone]

## 2024-05-12 NOTE — [facility]     Libertad Becerra Patient Status:  Emergency    1950 MRN [id number]   Location [facility] EMERGENCY DEPARTMENT Attending Omar De La Cruz MD   Hosp Day # 0 PCP Tash Egan MD     Chief Complaint: abd pain    Subjective:    History of Present Illness:     Libertad Becerra is a 73 year old female with onset acutely of abdominal pain around 2am in epigastric, central chest and just above umbilicus.  Was hypothermic in ER.  After fluids she feels much better.  Lactic acid elevated.  Denies cough or dysuria.  Felt chilled but denies fevers.    History/Other:    Past Medical History:  Past Medical History:    Abdominal hernia    Abdominal pain    Arthritis    Back pain    Back problem    Belching    Body piercing    CANCER    cervical cancer-partial hysterectomy    cataract s/p surgery    Chest pain    Fatigue    Flatulence/gas pain/belching    Frequent urination    Heartburn    Hemorrhoids    Hiatal hernia    High blood pressure    High cholesterol    History of blood transfusion    FEW YEARS AGO    Indigestion    Leaking of urine    Night sweats    Pain in joints    Problems with swallowing    Shortness of breath    Sleep disturbance    Uncomfortable fullness after meals    Wears glasses    Weight loss     Past Surgical History:   Past Surgical History:   Procedure Laterality Date    Cataract      Colonoscopy  2014    Colonoscopy      Egd      Hysterectomy      partial hysterec    Other surgical history  2024    robotic assisted hiatal hernia repair and fundoplication by Dr. Powell    Sigmoidoscopy,diagnostic        Family History:   Family History   Problem Relation Age of Onset    Cancer Father     Colon Cancer Father     Cancer Mother     Breast Cancer Mother 70        early to mid 70's    Cancer Paternal Grandmother     Other (heart  problem) Maternal Grandmother        hypertension Social History:    reports that she has never smoked.  She has never used smokeless tobacco. She reports that she does not drink alcohol and does not use drugs.     Allergies:   Allergies   Allergen Reactions    Latex HIVES    Morphine Sulfate In Dextrose RASH     States skin turned orange.    Latex ITCHING       Medications:    Current Facility-Administered Medications on File Prior to Encounter   Medication Dose Route Frequency Provider Last Rate Last Admin    [COMPLETED] heparin (Porcine) 5000 UNIT/ML injection 5,000 Units  5,000 Units Subcutaneous Once Gregorio Powell Jr., MD   5,000 Units at 04/08/24 0620    [COMPLETED] ceFAZolin (Ancef) 2 g in 20mL IV syringe premix  2 g Intravenous Once Gregorio Powell Jr., MD   2 g at 04/08/24 0745    [COMPLETED] ceFAZolin (Ancef) 2 g in 20mL IV syringe premix  2 g Intravenous Q8H Domenica Mayorga PA-C   2 g at 04/09/24 0023    [COMPLETED] iohexol (Omnipaque) 350 MG/ML injection via power injector 80 mL  80 mL Intravenous ONCE PRN Domenica Mayorga PA-C   80 mL at 03/22/24 1453     Current Outpatient Medications on File Prior to Encounter   Medication Sig Dispense Refill    LOSARTAN 50 MG Oral Tab TAKE 1 TABLET EVERY DAY 90 tablet 3    Cholecalciferol (VITAMIN D) 1000 units Oral Tab Take 2,000 Units by mouth daily.      acetaminophen 325 MG Oral Tab Take 1 tablet (325 mg total) by mouth every 6 (six) hours as needed for Pain.      HYDROcodone-acetaminophen 7.5-325 MG/15ML Oral Solution Take 10 mL by mouth every 4 (four) hours as needed. (Patient not taking: Reported on 4/10/2024) 300 mL 0    Naloxone HCl 4 MG/0.1ML Nasal Liquid 4 mg by Nasal route as needed. If patient remains unresponsive, repeat dose in other nostril 2-5 minutes after first dose. (Patient not taking: Reported on 4/10/2024) 1 kit 0       Review of Systems:   A comprehensive 12 point review of systems was completed.    Pertinent positives and negatives noted in the HPI.    Objective:   Physical Exam:    /56   Pulse 54   Temp 97.6 °F (36.4 °C)  (Oral)   Resp 15   Ht 5' 2\" (1.575 m)   Wt 144 lb (65.3 kg)   SpO2 100%   BMI 26.34 kg/m²   General: No acute distress, Alert  Respiratory: No rhonchi, no wheezes  Cardiovascular: S1, S2. Regular rate and rhythm  Abdomen: Soft, NT/ND, +BS  Neuro: No new focal deficits  Extremities: No edema  Peripheral Pulses:  Radial: Right 2+ or Left 2+  Capillary Refill:  <3 Secs  Skin:  Temp/Moisture: Warm and Dry  Color: Normal        Results:    Labs:      Labs Last 24 Hours:    Recent Labs   Lab 05/12/24 0447   RBC 4.70   HGB 14.5   HCT 43.1   MCV 91.7   MCH 30.9   MCHC 33.6   RDW 13.2   NEPRELIM 4.11   WBC 7.3   .0       Recent Labs   Lab 05/12/24 0447   *   BUN 11   CREATSERUM 0.72   EGFRCR 88   CA 9.3   ALB 3.9      K 3.4*      CO2 27.0   ALKPHO 79   AST 19   ALT 15   BILT 0.5   TP 7.4       No results found for: \"PT\", \"INR\"    Recent Labs   Lab 05/12/24 0447   TROPHS <3       No results for input(s): \"TROP\", \"PBNP\" in the last 168 hours.    No results for input(s): \"PCT\" in the last 168 hours.    Imaging: Imaging data reviewed in Epic.    Assessment & Plan:      #hypotheramia and lactic acidosis; suspect sepsis of unknown source; empiric zosyn; s/p sepsis fluid boluses in ER; monitor cultures.  CT pending    #s/p robotic assisted hiatal hernia repair and fundoplication performed on 4/8/24   #hypokalemia-replace per protocol  #HTN-hold bp meds  #hx cervical cancer    Addendum: possible early intra-abdominal abscess;  ileus vs possible SBO: NPO; iv fluids; IV abx.  Consult to dr. Powell.    Will do med rec once review complete.    Quality:  DVT prophylaxis:  SCDs, Lovenox  Code Status: see chart  Craig: NO  Craig Duration (in days): N/A  Central line: NO  Estimated discharge date: tbd    Plan of care discussed with patient and ER MD Magdy Salazar MD    Supplementary Documentation:

## 2024-05-13 ENCOUNTER — APPOINTMENT (OUTPATIENT)
Dept: CT IMAGING | Facility: HOSPITAL | Age: 74
DRG: 920 | End: 2024-05-13
Attending: HOSPITALIST

## 2024-05-13 PROBLEM — K65.1 INTRA-ABDOMINAL ABSCESS (HCC): Status: ACTIVE | Noted: 2024-05-13

## 2024-05-13 LAB
ALBUMIN SERPL-MCNC: 2.8 G/DL (ref 3.4–5)
ALBUMIN/GLOB SERPL: 1 {RATIO} (ref 1–2)
ALP LIVER SERPL-CCNC: 57 U/L
ALT SERPL-CCNC: 11 U/L
ANION GAP SERPL CALC-SCNC: 7 MMOL/L (ref 0–18)
AST SERPL-CCNC: 16 U/L (ref 15–37)
BILIRUB SERPL-MCNC: 0.8 MG/DL (ref 0.1–2)
BUN BLD-MCNC: 6 MG/DL (ref 9–23)
CALCIUM BLD-MCNC: 7.9 MG/DL (ref 8.5–10.1)
CHLORIDE SERPL-SCNC: 112 MMOL/L (ref 98–112)
CO2 SERPL-SCNC: 22 MMOL/L (ref 21–32)
CREAT BLD-MCNC: 0.54 MG/DL
EGFRCR SERPLBLD CKD-EPI 2021: 97 ML/MIN/1.73M2 (ref 60–?)
ERYTHROCYTE [DISTWIDTH] IN BLOOD BY AUTOMATED COUNT: 13.6 %
GLOBULIN PLAS-MCNC: 2.7 G/DL (ref 2.8–4.4)
GLUCOSE BLD-MCNC: 78 MG/DL (ref 70–99)
HCT VFR BLD AUTO: 34.3 %
HGB BLD-MCNC: 11.3 G/DL
INR BLD: 1.12 (ref 0.8–1.2)
MAGNESIUM SERPL-MCNC: 2.2 MG/DL (ref 1.6–2.6)
MCH RBC QN AUTO: 30.5 PG (ref 26–34)
MCHC RBC AUTO-ENTMCNC: 32.9 G/DL (ref 31–37)
MCV RBC AUTO: 92.7 FL
OSMOLALITY SERPL CALC.SUM OF ELEC: 288 MOSM/KG (ref 275–295)
PHOSPHATE SERPL-MCNC: 2.7 MG/DL (ref 2.5–4.9)
PLATELET # BLD AUTO: 215 10(3)UL (ref 150–450)
POTASSIUM SERPL-SCNC: 3.6 MMOL/L (ref 3.5–5.1)
POTASSIUM SERPL-SCNC: 4.2 MMOL/L (ref 3.5–5.1)
PROT SERPL-MCNC: 5.5 G/DL (ref 6.4–8.2)
PROTHROMBIN TIME: 14.4 SECONDS (ref 11.6–14.8)
RBC # BLD AUTO: 3.7 X10(6)UL
SODIUM SERPL-SCNC: 141 MMOL/L (ref 136–145)
WBC # BLD AUTO: 5.6 X10(3) UL (ref 4–11)

## 2024-05-13 PROCEDURE — 0W9J3ZZ DRAINAGE OF PELVIC CAVITY, PERCUTANEOUS APPROACH: ICD-10-PCS | Performed by: STUDENT IN AN ORGANIZED HEALTH CARE EDUCATION/TRAINING PROGRAM

## 2024-05-13 PROCEDURE — 99153 MOD SED SAME PHYS/QHP EA: CPT | Performed by: HOSPITALIST

## 2024-05-13 PROCEDURE — 99152 MOD SED SAME PHYS/QHP 5/>YRS: CPT | Performed by: HOSPITALIST

## 2024-05-13 PROCEDURE — 10009 FNA BX W/CT GDN 1ST LES: CPT | Performed by: HOSPITALIST

## 2024-05-13 PROCEDURE — 99233 SBSQ HOSP IP/OBS HIGH 50: CPT | Performed by: HOSPITALIST

## 2024-05-13 RX ORDER — MIDAZOLAM HYDROCHLORIDE 1 MG/ML
1 INJECTION INTRAMUSCULAR; INTRAVENOUS EVERY 5 MIN PRN
Status: DISCONTINUED | OUTPATIENT
Start: 2024-05-13 | End: 2024-05-13 | Stop reason: HOSPADM

## 2024-05-13 RX ORDER — NALOXONE HYDROCHLORIDE 0.4 MG/ML
80 INJECTION, SOLUTION INTRAMUSCULAR; INTRAVENOUS; SUBCUTANEOUS AS NEEDED
Status: DISCONTINUED | OUTPATIENT
Start: 2024-05-13 | End: 2024-05-13 | Stop reason: HOSPADM

## 2024-05-13 RX ORDER — POTASSIUM CHLORIDE 20 MEQ/1
40 TABLET, EXTENDED RELEASE ORAL EVERY 4 HOURS
Status: COMPLETED | OUTPATIENT
Start: 2024-05-13 | End: 2024-05-13

## 2024-05-13 RX ORDER — SIMETHICONE 80 MG
80 TABLET,CHEWABLE ORAL 2 TIMES DAILY
Qty: 60 TABLET | Refills: 0 | Status: SHIPPED | COMMUNITY
Start: 2024-05-13

## 2024-05-13 RX ORDER — NALOXONE HYDROCHLORIDE 0.4 MG/ML
INJECTION, SOLUTION INTRAMUSCULAR; INTRAVENOUS; SUBCUTANEOUS
Status: DISCONTINUED
Start: 2024-05-13 | End: 2024-05-13 | Stop reason: WASHOUT

## 2024-05-13 RX ORDER — TRAMADOL HYDROCHLORIDE 50 MG/1
50 TABLET ORAL EVERY 6 HOURS PRN
Status: DISCONTINUED | OUTPATIENT
Start: 2024-05-13 | End: 2024-05-15

## 2024-05-13 RX ORDER — ACETAMINOPHEN 500 MG
1000 TABLET ORAL EVERY 6 HOURS PRN
Status: DISCONTINUED | OUTPATIENT
Start: 2024-05-13 | End: 2024-05-14

## 2024-05-13 RX ORDER — SIMETHICONE 80 MG
80 TABLET,CHEWABLE ORAL 2 TIMES DAILY
Status: DISCONTINUED | OUTPATIENT
Start: 2024-05-13 | End: 2024-05-15

## 2024-05-13 RX ORDER — MIDAZOLAM HYDROCHLORIDE 1 MG/ML
INJECTION INTRAMUSCULAR; INTRAVENOUS
Status: COMPLETED
Start: 2024-05-13 | End: 2024-05-13

## 2024-05-13 RX ORDER — SODIUM CHLORIDE 9 MG/ML
INJECTION, SOLUTION INTRAVENOUS CONTINUOUS
Status: DISCONTINUED | OUTPATIENT
Start: 2024-05-13 | End: 2024-05-13 | Stop reason: HOSPADM

## 2024-05-13 RX ORDER — FLUMAZENIL 0.1 MG/ML
INJECTION INTRAVENOUS
Status: DISCONTINUED
Start: 2024-05-13 | End: 2024-05-13 | Stop reason: WASHOUT

## 2024-05-13 RX ORDER — FLUMAZENIL 0.1 MG/ML
0.2 INJECTION INTRAVENOUS AS NEEDED
Status: DISCONTINUED | OUTPATIENT
Start: 2024-05-13 | End: 2024-05-13 | Stop reason: HOSPADM

## 2024-05-13 NOTE — PROGRESS NOTES
[facility]   part of [facility]     Hospitalist Progress Note     Libertad Becerra Patient Status:  Inpatient    1950 MRN [id number]   [facility] 3NW-A Attending Magdy Paulino MD   Hosp Day # 1 PCP Tash Egan MD     Chief Complaint:   Chief Complaint   Patient presents with    Pain       Subjective:     Patient denies any complaints. Feels better today    Objective:    Review of Systems:   6  point ROS completed and was negative, except for pertinent positive and negatives stated in subjective.    Vital signs:  Temp:  [98 °F (36.7 °C)-98.6 °F (37 °C)] 98 °F (36.7 °C)  Pulse:  [46-62] 46  Resp:  [14-18] 18  BP: (114-137)/(51-65) 130/51  SpO2:  [95 %-99 %] 95 %    Physical Exam:    General: No acute distress.   Respiratory: Clear to auscultation bilaterally. No wheezes. No rhonchi.  Cardiovascular: S1, S2. Regular rate and rhythm. No murmurs.  Abdomen: Soft, nontender, nondistended.    Extremities: No edema.    Diagnostic Data:    Labs:  Recent Labs   Lab 24  0447 24  0607   WBC 7.3 5.6   HGB 14.5 11.3*   MCV 91.7 92.7   .0 215.0       Recent Labs   Lab 24  0447 24  0607   * 78   BUN 11 6*   CREATSERUM 0.72 0.54*   CA 9.3 7.9*   ALB 3.9 2.8*    141   K 3.4* 3.6    112   CO2 27.0 22.0   ALKPHO 79 57   AST 19 16   ALT 15 11*   BILT 0.5 0.8   TP 7.4 5.5*       Estimated Creatinine Clearance: 72.3 mL/min (A) (based on SCr of 0.54 mg/dL (L)).    No results for input(s): \"PTP\", \"INR\" in the last 168 hours.         COVID-19 Lab Results    COVID-19  No results found for: \"COVID19\"    Pro-Calcitonin  No results for input(s): \"PCT\" in the last 168 hours.    Cardiac  No results for input(s): \"TROP\", \"PBNP\" in the last 168 hours.    Creatinine Kinase  No results for input(s): \"CK\" in the last 168 hours.    Inflammatory Markers  No results for input(s): \"CRP\", \"GÉNESIS\", \"LDH\", \"DDIMER\" in the last 168 hours.    Recent Labs   Lab 24  1907    TROPHS <3       Imaging: Imaging data reviewed in Epic.    Medications:    ondansetron  4 mg Intravenous Once    piperacillin-tazobactam  3.375 g Intravenous Q8H    enoxaparin  40 mg Subcutaneous Nightly       Assessment & Plan:      #hypotheramia and lactic acidosis; suspect sepsis 2/2 early intra-abdominal infection, early abscess; empiric abx;  IR to attempt drainage today; fluids    #ileus-NPO; clinically do not suspect sbo  #s/p robotic assisted hiatal hernia repair and fundoplication performed on 4/8/24   #hypokalemia-replace per protocol prn  #HTN-hold bp meds  #hx cervical cancer     Plan of care discussed with patient     Magdy Salazar MD    Supplementary Documentation:     Quality:  DVT Prophylaxis: scds, lovenox  CODE status: see chart  Craig: no  Central line: no      Estimated date of discharge: tbd  At this point Ms. Becerra is expected to be discharge to: home            **Certification      PHYSICIAN Certification of Need for Inpatient Hospitalization - Initial Certification    Patient will require inpatient services that will reasonably be expected to span two midnight's based on the clinical documentation in H+P.   Based on patients current state of illness, I anticipate that, after discharge, patient will require TBD.

## 2024-05-13 NOTE — PROCEDURES
[facility]   part of [facility]  Procedure Note    Libertad Becerra Patient Status:  Inpatient    1950 MRN [id number]   Location [facility] 3NW-A Attending Magdy Paulino MD   Hosp Day # 1 PCP Tash Egan MD     Procedure: Pelvic aspiration possible drain placement    Pre-Procedure Diagnosis:  Pelvic fluid collection, possible abscess    Post-Procedure Diagnosis: Same    Anesthesia:  Local and Sedation    Findings:  Aspiration of 70ml return of clear serous fluid. No gross signs of infection, unable to coil wire in fluid suggesting lack of wall. No drain placed    Specimens: Fluid to micro    Blood Loss:  <5ml    Tourniquet Time: 0  Complications:  None  Drains:  None    Secondary Diagnosis:  None    Theron Burrows MD  2024

## 2024-05-13 NOTE — CONSULTS
Thoracic Surgery Consult Note     Name: Libertad Becerra   Age: 73 year old   Sex: female.   MRN: [id number]    Reason for Consultation: possible intraabdominal abscess     Consulting Physician: Dr. Paulino    Subjective:     Chief Complaint: \"I had severe abdominal pain\"     History of Present Illness:   Ms. Becerra is a 73 year old female s/p hiatal hernia repair and fundoplication surgery 4/8/24 presenting with abdominal pain.     Patient was doing well until Sunday at 2am when she woke up with sudden onset, severe, sharp abdominal pain. The pain wrapped across her abdomen and to her back. Nothing helped the pain. No fevers, chills, nausea, vomiting, or blood in the stool. She was having regular bowel movements with her last being Saturday. She was tolerating a soft diet at home but does not feel she drinks enough water. The pain persisted so she went to the ER for evaluation. CT showed possible ileus and moderate fluid in the abdomen with potential development into an abscess. Her pain resolved completely once IV fluids were started, and she feels back to normal now. Passing flatus.     Review Of Systems:   10 point review of systems was conducted and was negative except for the pertinent positives listed in the above HPI.    Past Medical History:   Past Medical History:    Abdominal hernia    Abdominal pain    Arthritis    Back pain    Back problem    Belching    Body piercing    CANCER    cervical cancer-partial hysterectomy    cataract s/p surgery    Chest pain    Fatigue    Flatulence/gas pain/belching    Frequent urination    Heartburn    Hemorrhoids    Hiatal hernia    High blood pressure    High cholesterol    History of blood transfusion    FEW YEARS AGO    Indigestion    Leaking of urine    Night sweats    Pain in joints    Problems with swallowing    Shortness of breath    Sleep disturbance    Uncomfortable fullness after meals    Wears glasses    Weight loss       Past Surgical History:   Past  Surgical History:   Procedure Laterality Date    Cataract      Colonoscopy  11/13/2014    Colonoscopy      Egd      Hysterectomy      partial hysterec    Other surgical history  04/08/2024    robotic assisted hiatal hernia repair and fundoplication by Dr. Powell    Sigmoidoscopy,diagnostic         Social History:   Social History     Socioeconomic History    Marital status:      Spouse name: Not on file    Number of children: Not on file    Years of education: Not on file    Highest education level: Not on file   Occupational History    Not on file   Tobacco Use    Smoking status: Never    Smokeless tobacco: Never   Vaping Use    Vaping status: Never Used   Substance and Sexual Activity    Alcohol use: No     Alcohol/week: 0.0 standard drinks of alcohol    Drug use: No    Sexual activity: Never   Other Topics Concern     Service Not Asked    Blood Transfusions Not Asked    Caffeine Concern No    Occupational Exposure Not Asked    Hobby Hazards Not Asked    Sleep Concern Not Asked    Stress Concern Not Asked    Weight Concern Not Asked    Special Diet Not Asked    Back Care Not Asked    Exercise No     Comment: biking    Bike Helmet Not Asked    Seat Belt Yes    Self-Exams Not Asked   Social History Narrative    Not on file     Social Determinants of Health     Financial Resource Strain: Low Risk  (4/10/2024)    Financial Resource Strain     Difficulty of Paying Living Expenses: Not hard at all     Med Affordability: No   Food Insecurity: No Food Insecurity (5/12/2024)    Food Insecurity     Food Insecurity: Never true   Transportation Needs: No Transportation Needs (5/12/2024)    Transportation Needs     Lack of Transportation: No     Car Seat: Not on file   Physical Activity: Not on file   Stress: Not on file   Social Connections: Not on file   Housing Stability: Low Risk  (5/12/2024)    Housing Stability     Housing Instability: No     Housing Instability Emergency: Not on file     Crib or  Doretha: Not on file       Family History:   Family History   Problem Relation Age of Onset    Cancer Father     Colon Cancer Father     Cancer Mother     Breast Cancer Mother 70        early to mid 70's    Cancer Paternal Grandmother     Other (heart  problem) Maternal Grandmother        Allergies:  Allergies   Allergen Reactions    Latex HIVES    Morphine Sulfate In Dextrose RASH     States skin turned orange.    Latex ITCHING       Medications:   Current Facility-Administered Medications on File Prior to Encounter   Medication Dose Route Frequency Provider Last Rate Last Admin    [COMPLETED] heparin (Porcine) 5000 UNIT/ML injection 5,000 Units  5,000 Units Subcutaneous Once Gregorio Powell Jr., MD   5,000 Units at 04/08/24 0620    [COMPLETED] ceFAZolin (Ancef) 2 g in 20mL IV syringe premix  2 g Intravenous Once Gregorio Powell Jr., MD   2 g at 04/08/24 0745    [COMPLETED] ceFAZolin (Ancef) 2 g in 20mL IV syringe premix  2 g Intravenous Q8H Domenica Mayorga PA-C   2 g at 04/09/24 0023    [COMPLETED] iohexol (Omnipaque) 350 MG/ML injection via power injector 80 mL  80 mL Intravenous ONCE PRN Domenica Mayorga PA-C   80 mL at 03/22/24 1453     Current Outpatient Medications on File Prior to Encounter   Medication Sig Dispense Refill    LOSARTAN 50 MG Oral Tab TAKE 1 TABLET EVERY DAY 90 tablet 3    Cholecalciferol (VITAMIN D) 1000 units Oral Tab Take 2,000 Units by mouth daily.      acetaminophen 325 MG Oral Tab Take 1 tablet (325 mg total) by mouth every 6 (six) hours as needed for Pain.      HYDROcodone-acetaminophen 7.5-325 MG/15ML Oral Solution Take 10 mL by mouth every 4 (four) hours as needed. (Patient not taking: Reported on 4/10/2024) 300 mL 0    Naloxone HCl 4 MG/0.1ML Nasal Liquid 4 mg by Nasal route as needed. If patient remains unresponsive, repeat dose in other nostril 2-5 minutes after first dose. (Patient not taking: Reported on 4/10/2024) 1 kit 0      ondansetron (Zofran) 4 MG/2ML  injection 4 mg  4 mg Intravenous Once    [COMPLETED] sodium chloride 0.9 % IV bolus 1,959 mL  30 mL/kg Intravenous Once    [COMPLETED] piperacillin-tazobactam (Zosyn) 4.5 g in dextrose 5% 100 mL IVPB-ADDV  4.5 g Intravenous Once    HYDROmorphone (Dilaudid) 1 MG/ML injection 0.5 mg  0.5 mg Intravenous Q30 Min PRN    [COMPLETED] iopamidol 76% (ISOVUE-370) injection for power injector  80 mL Intravenous ONCE PRN    piperacillin-tazobactam (Zosyn) 3.375 g in dextrose 5% 100 mL IVPB-ADDV  3.375 g Intravenous Q8H    acetaminophen (Tylenol Extra Strength) tab 500 mg  500 mg Oral Q4H PRN    melatonin tab 3 mg  3 mg Oral Nightly PRN    polyethylene glycol (PEG 3350) (Miralax) 17 g oral packet 17 g  17 g Oral Daily PRN    sennosides (Senokot) tab 17.2 mg  17.2 mg Oral Nightly PRN    bisacodyl (Dulcolax) 10 MG rectal suppository 10 mg  10 mg Rectal Daily PRN    ondansetron (Zofran) 4 MG/2ML injection 8 mg  8 mg Intravenous Q6H PRN    benzonatate (Tessalon) cap 200 mg  200 mg Oral TID PRN    glycerin-hypromellose- (Artificial Tears) 0.2-0.2-1 % ophthalmic solution 1 drop  1 drop Both Eyes QID PRN    sodium chloride (Saline Mist) 0.65 % nasal solution 1 spray  1 spray Each Nare Q3H PRN    lactated ringers infusion   Intravenous Continuous    enoxaparin (Lovenox) 40 MG/0.4ML SUBQ injection 40 mg  40 mg Subcutaneous Nightly    [] sodium chloride 0.9% infusion   Intravenous Continuous    [] HYDROmorphone (Dilaudid) 1 MG/ML injection 0.5 mg  0.5 mg Intravenous Q30 Min PRN    [] ondansetron (Zofran) 4 MG/2ML injection 4 mg  4 mg Intravenous Q4H PRN         Objective:      Vital Signs:  /46 (BP Location: Left arm)   Pulse 52   Temp 98.5 °F (36.9 °C) (Oral)   Resp 18   Ht 157.5 cm (5' 2\")   Wt 144 lb (65.3 kg)   SpO2 98%   BMI 26.34 kg/m²     Physical Exam:  General: well appearing female in no acute distress  HEENT: Normocephalic, PERRL, EOMI, no scleral icterus  Neck: Supple, trachea midline,  no JVD, no masses. Thyroid not grossly enlarged  Nodes: no cervical or supraclavicular lymphadenopathy appreciated  Heart: regular rate and rhythm.   Lungs: Normal respiratory effort.  Abdomen: Soft, Non-tender, non-distended. No hepatosplenomegaly noted.  Extremities: No clubbing or cyanosis. No lateralizing weakness  Neuro: No gross cranial nerve defects, no loss of sensation  Psych:  oriented to person place and time, normal mood and affect      Labs:   Lab Results   Component Value Date/Time    WBC 5.6 05/13/2024 06:07 AM    HGB 11.3 (L) 05/13/2024 06:07 AM    HCT 34.3 (L) 05/13/2024 06:07 AM    .0 05/13/2024 06:07 AM    MCV 92.7 05/13/2024 06:07 AM     Lab Results   Component Value Date/Time     05/13/2024 06:07 AM    K 3.6 05/13/2024 06:07 AM     05/13/2024 06:07 AM    CO2 22.0 05/13/2024 06:07 AM    BUN 6 (L) 05/13/2024 06:07 AM    GLU 78 05/13/2024 06:07 AM    CA 7.9 (L) 05/13/2024 06:07 AM    MG 2.2 05/13/2024 06:07 AM    PHOS 2.7 05/13/2024 06:07 AM     No results found for: \"INR\", \"PT\", \"PTT\"  No components found for: \"TROPI\"  Lab Results   Component Value Date/Time    ALB 2.8 (L) 05/13/2024 06:07 AM    TP 5.5 (L) 05/13/2024 06:07 AM    ALT 11 (L) 05/13/2024 06:07 AM    AST 16 05/13/2024 06:07 AM         Review of Data:   CT abdomen/pelvis 5/12/24    FINDINGS:          LIVER:  Unremarkable.     BILIARY:  Unremarkable.     PANCREAS:  Unremarkable.     SPLEEN:  Unremarkable.     KIDNEYS:  No acute abnormality.     ADRENALS:  Unremarkable.     AORTA/VASCULAR:  No aortic aneurysm. There are atherosclerotic changes including calcification involving the aorta, but no evidence for aneurysm involving the aorta.     RETROPERITONEUM:  Unremarkable.     BOWEL/MESENTERY:  Nissen fundoplication.  Moderate gastric fluid and air.  Moderate fluid within small bowel especially noted in the proximal small bowel where the bowel is mildly dilated.  The distal bowel does not appear dilated but there is  no  specific transition identified.  No free air identified.  Moderate amount of stool and air in the cecum mildly dilated, milder stool throughout the rest the colon.  No rectal fecal impaction.  No features to indicate acute colitis or diverticulitis.    Nonspecific age indeterminate mesenteric fatty stranding and small mesenteric lymph nodes.  Probable small duodenal diverticulum.  Moderate fluid the pelvis with a pocket measuring about 8.7 x 5.7 cm transverse AP dimensions respectively midline and left   of midline.  Although a thick enhancing rim is not present to indicate a definite or defined abscess, there is some suggestion of early organization with membrane formation especially along the superior left aspect of this fluid.  No large or freely  distributed generalized ascites.     ABDOMINAL WALL:  Unremarkable.     URINARY BLADDER:  Unremarkable.     LYMPH NODES PELVIS:  Unremarkable.     PELVIC ORGANS:  No acute process.     LUNG BASES:  No acute process.     BONES:  No acute abnormality. Note is made of degenerative changes present in the spine.                         Impression   CONCLUSION:       1. No free air.  Moderate size fluid in the pelvis midline and left of midline.  There is suggestion of some early organization and membrane formation with subtle enhancement at the margins of the fluid collection, could reflect developing organization  and potential development into abscess.  No gas bubbles at this time.  Close clinical follow-up, consider imaging follow-up.     2. Fundoplication.  Moderate fluid in proximal small bowel without transition, may reflect ileus, clinical follow-up to exclude developing bowel obstruction.  No free air seen.  No large or generalized ascites.           Assessment/Plan:     Ms. Becerra is a 73 year old female s/p hiatal hernia repair and fundoplication surgery 4/8/24 presenting with abdominal pain and finding of fluid collection on CT.     Patient was doing well  until Sunday morning when she woke up with abdominal pain that has since resolved with IV fluids. CT showed possible ileus and moderate fluid in the abdomen with potential development into an abscess. No leukocytosis. She underwent IR drainage in which clear fluid was drained.     Imaging reviewed and fluid does not appear infected. Feel that her pain is mostly likely due to gas / dehydration. Patient is having BM and passing flatus so unlikely to be an ileus. Recommend starting simethicone and increase fluid intake. Patient had a lot of questions about appropriate fluid intake so will consult nutrition. Otherwise, okay to discharge home from our standpoint once tolerating a soft diet.     -No acute surgical intervention needed  -Nutrition consult for education on appropriate fluid intake  -Start simethicone BID  -Okay to advance diet as tolerated to soft diet  -Okay to discharge from thoracic surgery standpoint once cleared with other services and tolerating a soft diet.     Patient seen and discussed with Dr Felix.     Domenica Mayorga PA-C  Thoracic Surgery    Patient seen and examined. I agree with above assessment and plan.    Doing well.  Had \"normal BM x2 on Saturday. Passing flatus now.  No N/V.  Pain resolved with fluid bolus in ER.  Pelvic fluid drained by IR was serous, no signs of infection.  No abdominal pain on exam today. Asking to eat.    I feel that this was likely dehydration or gas pain or some combination. Recommended that she hydrate.  We spoke of using Pedialyte.  I also think it would be smart for her to use gasX regularly for a couple of weeks and continue the colace which helped her have a bowel movement.    Ok to start CLD and then adv to soft diet when she tolerated clears.  Can go home when tolerates softs.  Nutrition consult to discuss home hydration strategy.    Gregorio Powell MD  Thoracic Surgery  Pager [phone]     I spent 60 minutes on this visit which included: review of  tests, independently interpreting results, obtaining history, counseling on additional tests and procedures, communicating with the consulting physician,  care coordination and surgery, its risks and alternatives as described in the above assessment and plan.

## 2024-05-13 NOTE — INTERVAL H&P NOTE
The above referenced H&P was reviewed by Theron Burrows MD on 5/13/2024, the patient was examined and no significant changes have occurred in the patient's condition since the H&P was performed.  Risks, benefits, alternative treatments and consequences of no treatment were discussed.  We will proceed with procedure as planned.      Theron Burrows MD  5/13/2024  11:05 AM

## 2024-05-13 NOTE — PROGRESS NOTES
[facility]   part of [facility]     Hospitalist Progress Note     Libertad Becerra Patient Status:  Inpatient    1950 MRN [id number]   [facility] 3NW-A Attending Magdy Paulino MD   Hosp Day # 2 PCP Tash Egan MD     Chief Complaint: Abdominal pain    Subjective:   Patient denies abdominal pain. No nausea or vomiting. Tolerating CLD.    Current medications:   losartan  50 mg Oral Daily    docusate sodium  100 mg Oral BID    simethicone  80 mg Oral BID    enoxaparin  40 mg Subcutaneous Nightly       Objective:    Review of Systems:   10 point ROS completed and was negative, except for pertinent positive and negatives stated in subjective.    Vital signs:  Temp:  [97.9 °F (36.6 °C)-98.5 °F (36.9 °C)] 97.9 °F (36.6 °C)  Pulse:  [52-63] 62  Resp:  [16-20] 16  BP: (123-152)/(47-82) 148/70  SpO2:  [94 %-98 %] 96 %  Patient Weight for the past 72 hrs:   Weight   24 0441 144 lb (65.3 kg)   24 1203 144 lb (65.3 kg)     Physical Exam:    General: No acute distress.   Respiratory: Clear to auscultation bilaterally.  Cardiovascular: S1, S2. Regular rate and rhythm.   Abdomen: Soft, nontender, nondistended.  Positive bowel sounds.   Extremities: No edema.  Neuro: AAOx3    Diagnostic Data:    Labs:  Recent Labs   Lab 24  0447 24  0607 24  0753   WBC 7.3 5.6  --    HGB 14.5 11.3*  --    MCV 91.7 92.7  --    .0 215.0  --    INR  --   --  1.12       Recent Labs   Lab 24  0447 24  0607 24  2116   * 78  --    BUN 11 6*  --    CREATSERUM 0.72 0.54*  --    CA 9.3 7.9*  --    ALB 3.9 2.8*  --     141  --    K 3.4* 3.6 4.2    112  --    CO2 27.0 22.0  --    ALKPHO 79 57  --    AST 19 16  --    ALT 15 11*  --    BILT 0.5 0.8  --    TP 7.4 5.5*  --        Estimated Creatinine Clearance: 72.3 mL/min (A) (based on SCr of 0.54 mg/dL (L)).    Recent Labs   Lab 24  0753   PTP 14.4   INR 1.12            COVID-19 Lab  Results    COVID-19  No results found for: \"COVID19\"    Pro-Calcitonin  No results for input(s): \"PCT\" in the last 168 hours.    Cardiac  No results for input(s): \"TROP\", \"PBNP\" in the last 168 hours.    Creatinine Kinase  No results for input(s): \"CK\" in the last 168 hours.    Inflammatory Markers  No results for input(s): \"CRP\", \"GÉNESIS\", \"LDH\", \"DDIMER\" in the last 168 hours.    Recent Labs   Lab 05/12/24  0447   TROPHS <3       Imaging: Imaging data reviewed in Epic.    Medications:    losartan  50 mg Oral Daily    docusate sodium  100 mg Oral BID    simethicone  80 mg Oral BID    enoxaparin  40 mg Subcutaneous Nightly       Assessment & Plan:    Sepsis d/t intraabdominal infection, ruled out. Early abscess? IR drain clear, serous fluid, no signs of infection.  IVF - decrease rate  Stop abx  Monitor   Ileus vs developing SBO  Bowel care  Robotic hiatal hernia repair and fundoplication 4/2024  CLD, advance to full liquids today  Simethicone  Essential hypertension  Resume Losartan  Dyslipidemia  History of cervical cancer sp partial hysterectomy    Supplementary Documentation:   Quality:  DVT Prophylaxis: Lovenox    At this point Ms. Becerra is expected to be discharge to: Home    Plan of care discussed with patient and RN.     Douglas Magdaleno MD

## 2024-05-13 NOTE — PROGRESS NOTES
Pt. Returned from CT.  Per RN report drained 70ml clear serous drainage. Small gauze and tegaderm dressing to left buttock intact.  No c/o pain.   Ok to start clear liquids per MD.  Cardiothoracic  RN came by this AM to see pt.  MD to round later.

## 2024-05-13 NOTE — IMAGING NOTE
Called yennifer Rodriguez RN and instructions given to have labs drawn and NPO status. Pt is consentable and has working IV. Will hold blood thinners per protocol.  Appt time given of 1300 today pending MD review.

## 2024-05-13 NOTE — DISCHARGE INSTRUCTIONS
Nutrition    It is recommended that you consume 8 - 8oz cups of fluid each day. (64oz total / 2L)  Fluids can include water, tea, coffee, milk, juice and more. Fluids can also include soups, and other foods that melt at room temperature (ice cream, jello, etc).    Feel free to use beverages such as Gatorade or Pedialyte, particularly if you are experiencing vomiting, diarrhea, or excessive sweating.    Pay attention to signs of dehydration, which can include but are not limited to:  less frequent urination  dark yellow urine  feeling dizzy or lightheaded  dry mouth and lips  feeling tired.      If you begin to exhibit these symptoms, increase your intake of beverages. Make sure to contact your MD if symptoms do not resolve with increased intake of fluids.     For questions or concerns regarding diet and nutrition  DERIK Archer RD,[facility]  [phone]

## 2024-05-13 NOTE — PLAN OF CARE
BS hypo, passing flatus, last BM 5/11 per pt.  Abd soft, old lap sites on abd from recent hernia repair beginning of April healed.  No c/o pain, no n/v.  PT/INR drawn fro drain placement at 1130 today.  Abx infusing.  K 3.6 to be replaced per protocol.  POC explained to pt.  Pt. verbalized understanding.  CBIR.

## 2024-05-13 NOTE — PROGRESS NOTES
Pt. c/o sinus pain HA and requesting meds more than what is ordered.  Md notified.  New orders received.  Pt. became nildly nauseous r/t ha pain.   Pt. given zofran at 1537.  Waiting for zofran effectiveness and pt. would like to try the tramadol.  Will -re-assess.

## 2024-05-13 NOTE — DIETARY NOTE
[facility]   part of [facility]   CLINICAL NUTRITION    Libertad Becerra     Admitting diagnosis:  Abdominal pain of unknown etiology [R10.9]  Sepsis due to undetermined organism without resultant organ failure (HCC) [A41.9]    Ht: 157.5 cm (5' 2\")  Wt: 65.3 kg (144 lb).   Body mass index is 26.34 kg/m².  IBW: 50kg    Wt Readings from Last 6 Encounters:   05/12/24 65.3 kg (144 lb)   04/17/24 67.9 kg (149 lb 9.6 oz)   04/08/24 69.2 kg (152 lb 9.6 oz)   03/06/24 69.9 kg (154 lb)   03/05/24 69.8 kg (153 lb 12.8 oz)   10/25/23 70.8 kg (156 lb)        Labs/Meds reviewed    Diet:       Procedures    Clear liquid diet Is Patient on Accuchecks? No     Percent Meals Eaten (last 3 days)       None          Pt chart reviewed d/t MD Consult.  Patient reports fair appetite at this time.  No intake yet documented - pt working on clear liquid tray at time of visit  Tolerating po diet without diarrhea, emesis, or constipation.     PMH includes CA, HTN, HLD. S/p hernia repair and fundoplication. RD received consult for \"education on hydration\".  Pt was previously tolerating diet, working on eating at time of visit. No n/v/d. Wt down 4# x 1 mo; 10# x 2 mo - but suspect dehydration playing a roll in wt change.  Recommend reweigh as able on standing scale.  Pt appears well nourished per visual exam.  Discussed drinking fluids - advised 2L daily.  All questions answered at time of visit.    Patient is at low nutrition risk at this time.    Please consult if patient status changes or nutrition issues arise.    Suzi Finney RD, LDN, [facility]  Clinical Dietitian  Phone [phone]

## 2024-05-13 NOTE — IMAGING NOTE
Received pt to CT 1.  Pt verified name and  as well as allergies.  Pt states NPO since yesterday.  Pt blood thinner lovenox last given last night.  Lab results of PT/INR and PLT reviewed.  Informed consent obtained by Dr. CHIDI Burrows.  Pt positioned prone on scanner.  CRM and pulse ox monitoring applied, alarms enabled. Sterile prep and 1% lido to area by Dr. Burrows. Fluid collection aspirated by Dr. Burrows approximately 70ml serous fluid removed via syringe.  Neosporin/tegaderm dressing applied.  Pt positioned supine on bed.  Pt awake and alert, conversing appropriately with this RN and in no apparent distress.  SBAR  to inpatient RN at .  Pt transported to room 312 with belongings. Pt accompanied by this RN and transporter.

## 2024-05-13 NOTE — PLAN OF CARE
Patient alert and oriented x4, vital signs stable on room air. No pain or nausea, up ad solis to void. Ivf infusing along with Iv abx. Plan is to wait for cultures to result. Safety measures in place, questions addressed, plan of care discussed with patient.

## 2024-05-14 PROCEDURE — 99233 SBSQ HOSP IP/OBS HIGH 50: CPT | Performed by: HOSPITALIST

## 2024-05-14 RX ORDER — ACETAMINOPHEN 160 MG/5ML
650 SOLUTION ORAL EVERY 6 HOURS PRN
Status: DISCONTINUED | OUTPATIENT
Start: 2024-05-14 | End: 2024-05-15

## 2024-05-14 RX ORDER — LOSARTAN POTASSIUM 50 MG/1
50 TABLET ORAL DAILY
Status: DISCONTINUED | OUTPATIENT
Start: 2024-05-14 | End: 2024-05-15

## 2024-05-14 RX ORDER — DOCUSATE SODIUM 100 MG/1
100 CAPSULE, LIQUID FILLED ORAL 2 TIMES DAILY
Status: DISCONTINUED | OUTPATIENT
Start: 2024-05-14 | End: 2024-05-15

## 2024-05-14 NOTE — PAYOR COMM NOTE
--------------  ADMISSION REVIEW     Payor: ALONDRA YAN [facility] [place]  Subscriber #:  [id number]  Authorization Number: [id number]    Admit date: 5/12/24  Admit time: 11:54 AM       REVIEW DOCUMENTATION:     ED Provider Notes        ED Provider Notes signed by Omar De La Cruz MD at 5/12/2024 10:27 AM       Author: Omar De La Cruz MD Service: -- Author Type: Physician    Filed: 5/12/2024 10:27 AM Date of Service: 5/12/2024  5:54 AM Status: Signed    : Omar De La Cruz MD (Physician)           Patient Seen in: [facility] Emergency Department      History     Chief Complaint   Patient presents with    Pain     Stated Complaint: dx hiatal hernia 1 mo ago, fine until now, extreme pain, sweating, ABD pain and*    Subjective:   HPI    73-year-old female who presents to the emergency department with her  due to report of abdominal pain.  The pain began in her epigastric and right upper quadrant at approximately 2 AM.  Woke her from sleeping.  She has had rigors and shakes.  She has had chills.  She has had 2 episodes of emesis.  Reviewing her record she was diagnosed with a hiatal hernia on 4/8/2024 and had a robotic assisted hiatal hernia repair and fundoplication.  She has not taken any medication for pain control.  Pain came on suddenly.  She last ate 11 hours ago.    Objective:   Past Medical History:    Abdominal hernia    Abdominal pain    Arthritis    Back pain    Back problem    Belching    Body piercing    CANCER    cervical cancer-partial hysterectomy    cataract s/p surgery    Chest pain    Fatigue    Flatulence/gas pain/belching    Frequent urination    Heartburn    Hemorrhoids    Hiatal hernia    High blood pressure    High cholesterol    History of blood transfusion    FEW YEARS AGO    Indigestion    Leaking of urine    Night sweats    Pain in joints    Problems with swallowing    Shortness of breath    Sleep disturbance    Uncomfortable fullness after meals    Wears glasses    Weight loss               Past Surgical History:   Procedure Laterality Date    Cataract      Colonoscopy  11/13/2014    Colonoscopy      Egd      Hysterectomy      partial hysterec    Other surgical history  04/08/2024    robotic assisted hiatal hernia repair and fundoplication by Dr. Powell    Sigmoidoscopy,diagnostic                  Social History     Socioeconomic History    Marital status:    Tobacco Use    Smoking status: Never    Smokeless tobacco: Never   Vaping Use    Vaping status: Never Used   Substance and Sexual Activity    Alcohol use: No     Alcohol/week: 0.0 standard drinks of alcohol    Drug use: No    Sexual activity: Never   Other Topics Concern    Caffeine Concern No    Exercise No     Comment: biking    Seat Belt Yes     Social Determinants of Health     Financial Resource Strain: Low Risk  (4/10/2024)    Financial Resource Strain     Difficulty of Paying Living Expenses: Not hard at all     Med Affordability: No   Food Insecurity: No Food Insecurity (4/8/2024)    Food Insecurity     Food Insecurity: Never true   Transportation Needs: No Transportation Needs (4/8/2024)    Transportation Needs     Lack of Transportation: No   Housing Stability: Low Risk  (4/8/2024)    Housing Stability     Housing Instability: No              Review of Systems    Positive for stated complaint: dx hiatal hernia 1 mo ago, fine until now, extreme pain, sweating, ABD pain and*  Other systems are as noted in HPI.  Constitutional and vital signs reviewed.      All other systems reviewed and negative except as noted above.    Physical Exam     ED Triage Vitals   BP 05/12/24 0441 (!) 163/66   Pulse 05/12/24 0441 (!) 45   Resp 05/12/24 0441 15   Temp 05/12/24 0459 (!) 94.4 °F (34.7 °C)   Temp src 05/12/24 0459 Oral   SpO2 05/12/24 0441 100 %   O2 Device 05/12/24 0441 None (Room air)       Current Vitals:   Vital Signs  BP: 130/65  Pulse: 57  Resp: 15  Temp: 97.6 °F (36.4 °C)  Temp src: Oral  MAP (mmHg): 81    Oxygen Therapy  SpO2:  97 %  O2 Device: None (Room air)            Physical Exam  General: 73-year-old female who appears ill at this time and is having rigors.  Complaining of discomfort in the epigastrium.  HEENT: Mucosa is dry tongue is midline.  Pupils are reactive to light.  No scleral icterus or conjunctival pallor.  Lungs: Equal breath sounds bilaterally no wheezes or rhonchi.  Cardiac: Heart rate of 50 normal S1 and S2 without murmurs or ectopy  Abdomen: There is some epigastric discomfort on palpation.  No distention of the abdomen.  Voluntary guarding is noted.  Extremities: Moving all 4 without difficulty no deformities.  ED Course     Labs Reviewed   COMP METABOLIC PANEL (14) - Abnormal; Notable for the following components:       Result Value    Glucose 142 (*)     Potassium 3.4 (*)     Calculated Osmolality 302 (*)     All other components within normal limits   LACTIC ACID, PLASMA - Abnormal; Notable for the following components:    Lactic Acid 2.6 (*)     All other components within normal limits   URINALYSIS WITH CULTURE REFLEX - Abnormal; Notable for the following components:    Urine Color Colorless (*)     Spec Gravity >1.030 (*)     All other components within normal limits   LIPASE - Normal   TROPONIN I HIGH SENSITIVITY - Normal   CBC WITH DIFFERENTIAL WITH PLATELET    Narrative:     The following orders were created for panel order CBC With Differential With Platelet.  Procedure                               Abnormality         Status                     ---------                               -----------         ------                     CBC W/ DIFFERENTIAL[[id number]]                              Final result                 Please view results for these tests on the individual orders.   LACTIC ACID REFLEX POST POSTIVE   RAINBOW DRAW BLUE   RAINBOW DRAW GOLD   BLOOD CULTURE   BLOOD CULTURE   CBC W/ DIFFERENTIAL     EKG    Rate, intervals and axes as noted on EKG Report.  Rate: 45  Rhythm: Sinus Rhythm  Reading:  Sinus bradycardia otherwise normal EKG            Narrative  PROCEDURE:  XR CHEST AP PORTABLE  (CPT=71045)     TECHNIQUE:  AP chest radiograph was obtained.     COMPARISON:  [place], XR, XR RIBS WITH CHEST (3 VIEWS), LEFT  (CPT=71101), 6/13/2023, 4:25 PM.     INDICATIONS:  dx hiatal hernia 1 mo ago, fine until now, extreme pain, sweating     PATIENT STATED HISTORY: (As transcribed by Technologist)  Patient complains of pain at hiatal hernia area that she had surgery on last month.                      Impression  CONCLUSION:    Borderline heart size.  Left lower lobe opacity retrocardiac medially may reflect component of the hiatal hernia which was present on prior CT appears less conspicuous currently, versus possible pneumonia or atelectasis.  The right chest  appears clear.  No pneumothorax or sizable effusion.     LOCATION:  [facility]                 Dictated by (CST): Adan Yepez MD on 5/12/2024 at 7:18 AM      Finalized by (CST): Adan Yepez MD on 5/12/2024 at 7:19 AM     Narrative  PROCEDURE:  CT ABDOMEN+PELVIS (CONTRAST ONLY) (CPT=74177)     COMPARISON:  None.     INDICATIONS:  dx hiatal hernia 1 mo ago, fine until now, extreme pain, sweating, ABD pain and nausea     TECHNIQUE:  CT scanning was performed from the dome of the diaphragm to the pubic symphysis with non-ionic intravenous contrast material. Post contrast coronal MPR imaging was performed.  Dose reduction techniques were used. Dose information is  transmitted to the ACR (American College of Radiology) NRDR (National Radiology Data Registry) which includes the Dose Index Registry.     PATIENT STATED HISTORY:(As transcribed by Technologist)  Right upper quadrant pain , nausea, diaphoretic, recent Keagan fundoplication.      CONTRAST USED:  80cc of Isovue 370     FINDINGS:          LIVER:  Unremarkable.     BILIARY:  Unremarkable.     PANCREAS:  Unremarkable.     SPLEEN:  Unremarkable.     KIDNEYS:  No acute abnormality.     ADRENALS:   Unremarkable.     AORTA/VASCULAR:  No aortic aneurysm. There are atherosclerotic changes including calcification involving the aorta, but no evidence for aneurysm involving the aorta.     RETROPERITONEUM:  Unremarkable.     BOWEL/MESENTERY:  Nissen fundoplication.  Moderate gastric fluid and air.  Moderate fluid within small bowel especially noted in the proximal small bowel where the bowel is mildly dilated.  The distal bowel does not appear dilated but there is no  specific transition identified.  No free air identified.  Moderate amount of stool and air in the cecum mildly dilated, milder stool throughout the rest the colon.  No rectal fecal impaction.  No features to indicate acute colitis or diverticulitis.    Nonspecific age indeterminate mesenteric fatty stranding and small mesenteric lymph nodes.  Probable small duodenal diverticulum.  Moderate fluid the pelvis with a pocket measuring about 8.7 x 5.7 cm transverse AP dimensions respectively midline and left   of midline.  Although a thick enhancing rim is not present to indicate a definite or defined abscess, there is some suggestion of early organization with membrane formation especially along the superior left aspect of this fluid.  No large or freely  distributed generalized ascites.     ABDOMINAL WALL:  Unremarkable.     URINARY BLADDER:  Unremarkable.     LYMPH NODES PELVIS:  Unremarkable.     PELVIC ORGANS:  No acute process.     LUNG BASES:  No acute process.     BONES:  No acute abnormality. Note is made of degenerative changes present in the spine.                        Impression  CONCLUSION:       1. No free air.  Moderate size fluid in the pelvis midline and left of midline.  There is suggestion of some early organization and membrane formation with subtle enhancement at the margins of the fluid collection, could reflect developing organization  and potential development into abscess.  No gas bubbles at this time.  Close clinical follow-up,  consider imaging follow-up.     2. Fundoplication.  Moderate fluid in proximal small bowel without transition, may reflect ileus, clinical follow-up to exclude developing bowel obstruction.  No free air seen.  No large or generalized ascites.          LOCATION:  [facility]        Dictated by (CST): Adan Yepez MD on 5/12/2024 at 9:51 AM      Finalized by (CST): Adan Yepez MD on 5/12/2024 at 9:56 AM            MDM    Differential diagnosis includes but is not limited to bowel obstruction, bowel perforation, abscess, acute cholecystitis, acute pancreatitis, sepsis from urinary tract infection, Boerhaave's syndrome, cardiac ischemia, pneumothorax, pneumonia.  Admission disposition: 5/12/2024 10:27 AM       Laboratories were sent.  Lipase was normal.  Lactic acid was 2.6.  Troponin was normal.  CBC was normal.  Glucose 142 with potassium 3.4 the rest metabolic and was normal.  She received 30 cc/kg bolus of fluids she received Zosyn intravenously and Zofran for nausea control.  Chest x-ray  I reviewed the x-rays and agree with the radiologist report that showed borderline heart size left lower lobe opacity retrocardiac medially may reflect component of the hiatal hernia which was present on prior CT appears less conspicuous currently versus possible pneumonia or atelectasis.  The right chest appears clear no pneumothorax or sizable effusion.  CT scan abdomen pelvis  I reviewed the x-rays and agree with the radiologist report that showed    No free air.  Moderate sized fluid in the pelvis midline and left of midline.  There is suggestion of some early organization and membrane formation with subtle enhancement at the margins of fluid collection could reflect developing organization potential development of the abscess.  No gas bubbles at this time close clinical follow-up consider imaging follow-up.  Fundoplication moderate fluid in proximal small bowel without transition may reflect ileus.  Clinical follow-up to  exclude developing bowel obstruction.  No free air seen.  No large or generalized ascites.  The patient was admitted to the hospital under the hospitalist for continued care.  The patient was seen up ambulating throughout the ER without any difficulty.  Nontoxic and well.     A total of 35 minutes of critical care time (exclusive of billable procedures) was administered to manage the patient's critical lab values due to her elevated lactic acid concerning for sepsis.  This involved direct patient intervention, complex decision making, and/or extensive discussions with the patient, family, and clinical staff.             [facility]   part of [facility]      Sepsis Reassessment Note    /83   Pulse 60   Temp (!) 94.4 °F (34.7 °C) (Oral)   Resp 25   Ht 157.5 cm (5' 2\")   Wt 65.3 kg   SpO2 100%   BMI 26.34 kg/m²      I completed the sepsis reassessment at 1020    Cardiac:  Regularity: Regular  Rate: Normal  Heart Sounds: S1,S2    Lungs:   Right: Clear  Left: Clear    Peripheral Pulses:  Radial: Right 2+ or Left 2+      Capillary Refill:  <3 Secs    Skin:  Temp/Moisture: Warm and Dry  Color: Normal      Omar De La Cruz MD  5/12/2024  5:54 AM            Medical Decision Making      Disposition and Plan     Clinical Impression:  1. Sepsis due to undetermined organism without resultant organ failure (HCC)    2. Abdominal pain of unknown etiology         Disposition:  Admit  5/12/2024 10:27 am    Follow-up:  No follow-up provider specified.        Medications Prescribed:  Current Discharge Medication List                            Hospital Problems       Present on Admission  Date Reviewed: 5/1/2024            ICD-10-CM Noted POA    * (Principal) Sepsis due to undetermined organism without resultant organ failure (HCC) A41.9 5/12/2024 Unknown    Hyperglycemia R73.9 5/12/2024 Yes    Hypernatremia E87.0 5/12/2024 Yes    Hypokalemia E87.6 5/12/2024 Yes    Hypothermia T68.XXXA 5/12/2024 Unknown    Lactic  acid acidosis E87.20 5/12/2024 Unknown                     Signed by Omar De La Cruz MD on 5/12/2024 10:27 AM         MEDICATIONS ADMINISTERED IN LAST 1 DAY:  docusate sodium (Colace) cap 100 mg       Date Action Dose Route User    5/14/2024 0849 Given 100 mg Oral Ramón Hinkle RN          enoxaparin (Lovenox) 40 MG/0.4ML SUBQ injection 40 mg       Date Action Dose Route User    5/13/2024 2017 Given 40 mg Subcutaneous (Left Lower Abdomen) Santos Hartman III, RN          lactated ringers infusion       Date Action Dose Route User    5/14/2024 1032 Rate/Dose Change (none) Intravenous Ramón Hinkle RN    5/14/2024 1006 New Bag (none) Intravenous Ramón Hinkle RN          losartan (Cozaar) tab 50 mg       Date Action Dose Route User    5/14/2024 0849 Given 50 mg Oral Ramón Hinkle RN          piperacillin-tazobactam (Zosyn) 3.375 g in dextrose 5% 100 mL IVPB-ADDV       Date Action Dose Route User    5/14/2024 0616 New Bag 3.375 g Intravenous Santos Hartman III, RN    5/13/2024 2354 New Bag 3.375 g Intravenous Santos Hartman III, RN          potassium chloride (Klor-Con M20) tab 40 mEq       Date Action Dose Route User    5/13/2024 1750 Given 40 mEq Oral Jennifer Morocho RN          simethicone (Mylicon) chewable tab 80 mg       Date Action Dose Route User    5/14/2024 0849 Given 80 mg Oral Ramón Hinkle RN    5/13/2024 2017 Given 80 mg Oral Santos Hartman III, RN            Vitals (last day)       Date/Time Temp Pulse Resp BP SpO2 Weight O2 Device O2 Flow Rate (L/min) [facility]    05/14/24 1100 97.9 °F (36.6 °C) 62 16 148/70 96 % -- None (Room air) --     05/14/24 0756 98.5 °F (36.9 °C) 62 16 152/59 95 % -- None (Room air) --     05/14/24 0415 98.1 °F (36.7 °C) 52 18 143/60 96 % -- None (Room air) -- ED    05/14/24 0010 98 °F (36.7 °C) 59 18 123/72 94 % -- None (Room air) -- ED    05/13/24 1955 98.2 °F (36.8 °C) 63 19 131/59 95 % -- None (Room air) -- JS    05/13/24 1649 98.4 °F (36.9 °C) 57 20 133/82 97 % -- None  (Room air) --     05/13/24 1225 -- 53 18 138/47 98 % -- None (Room air) --     05/13/24 1145 -- 72 15 140/94 96 % -- -- --     05/13/24 1140 -- 55 10 134/54 99 % -- -- --     05/13/24 1135 -- 48 9 142/61 98 % -- -- --     05/13/24 1130 -- 48 9 128/63 98 % -- -- --     05/13/24 1125 -- 51 7 133/60 99 % -- -- --     05/13/24 1120 -- 58 10 151/63 100 % -- -- --     05/13/24 1115 -- 56 16 156/62 100 % -- Nasal cannula 2 L/min     05/13/24 0822 98.5 °F (36.9 °C) 52 18 138/46 98 % -- None (Room air) --     05/13/24 0820 -- 77 -- -- 97 % -- Nasal cannula --     05/13/24 0400 98 °F (36.7 °C) 46 18 130/51 95 % -- None (Room air) -- ED           H&P  History of Present Illness:      Libertad Becerra is a 73 year old female with onset acutely of abdominal pain around 2am in epigastric, central chest and just above umbilicus.  Was hypothermic in ER.  After fluids she feels much better.  Lactic acid elevated.  Denies cough or dysuria.  Felt chilled but denies fevers.       Objective:  Physical Exam:    /56   Pulse 54   Temp 97.6 °F (36.4 °C) (Oral)   Resp 15   Ht 5' 2\" (1.575 m)   Wt 144 lb (65.3 kg)   SpO2 100%   BMI 26.34 kg/m²   General: No acute distress, Alert  Respiratory: No rhonchi, no wheezes  Cardiovascular: S1, S2. Regular rate and rhythm  Abdomen: Soft, NT/ND, +BS  Neuro: No new focal deficits  Extremities: No edema  Peripheral Pulses:  Radial: Right 2+ or Left 2+  Capillary Refill:  <3 Secs  Skin:  Temp/Moisture: Warm and Dry  Color: Normal                 Results:  Labs:        Labs Last 24 Hours:         Recent Labs   Lab 05/12/24 0447   RBC 4.70   HGB 14.5   HCT 43.1   MCV 91.7   MCH 30.9   MCHC 33.6   RDW 13.2   NEPRELIM 4.11   WBC 7.3   .0             Recent Labs   Lab 05/12/24 0447   *   BUN 11   CREATSERUM 0.72   EGFRCR 88   CA 9.3   ALB 3.9      K 3.4*      CO2 27.0   ALKPHO 79   AST 19   ALT 15   BILT 0.5   TP 7.4          Assessment &  Plan:  #hypotheramia and lactic acidosis; suspect sepsis of unknown source; empiric zosyn; s/p sepsis fluid boluses in ER; monitor cultures.  CT pending     #s/p robotic assisted hiatal hernia repair and fundoplication performed on 4/8/24   #hypokalemia-replace per protocol  #HTN-hold bp meds  #hx cervical cancer     Addendum: possible early intra-abdominal abscess;  ileus vs possible SBO: NPO; iv fluids; IV abx.  Consult to dr. Powell.     Will do med rec once review complete.     Quality:  DVT prophylaxis:  SCDs, Lovenox  Code Status: see chart  Craig: NO  Craig Duration (in days): N/A  Central line: NO  Estimated discharge date: tbd 5/13 HOSPITALIST NOTE  Chief Complaint   Patient presents with    Pain        Vital signs:  Temp:  [98 °F (36.7 °C)-98.6 °F (37 °C)] 98 °F (36.7 °C)  Pulse:  [46-62] 46  Resp:  [14-18] 18  BP: (114-137)/(51-65) 130/51  SpO2:  [95 %-99 %] 95 %     Recent Labs   Lab 05/12/24  0447 05/13/24  0607   WBC 7.3 5.6   HGB 14.5 11.3*   MCV 91.7 92.7   .0 215.0              Recent Labs   Lab 05/12/24  0447 05/13/24  0607   * 78   BUN 11 6*   CREATSERUM 0.72 0.54*   CA 9.3 7.9*   ALB 3.9 2.8*    141   K 3.4* 3.6    112   CO2 27.0 22.0   ALKPHO 79 57   AST 19 16   ALT 15 11*   BILT 0.5 0.8   TP 7.4 5.5*          Assessment & Plan:  #hypotheramia and lactic acidosis; suspect sepsis 2/2 early intra-abdominal infection, early abscess; empiric abx;  IR to attempt drainage today; fluids    #ileus-NPO; clinically do not suspect sbo  #s/p robotic assisted hiatal hernia repair and fundoplication performed on 4/8/24   #hypokalemia-replace per protocol prn  #HTN-hold bp meds  #hx cervical cancer     Plan of care discussed with patient     5/13 CTS CONSUL T NOTE  Thoracic Surgery Consult Note      Name: Libertad Becerra   Age: 73 year old   Sex: female.   MRN: [id number]     Reason for Consultation: possible intraabdominal abscess      Consulting Physician: Dr. Paulino      Subjective:      Chief Complaint: \"I had severe abdominal pain\"      History of Present Illness:   Ms. Becerra is a 73 year old female s/p hiatal hernia repair and fundoplication surgery 4/8/24 presenting with abdominal pain.      Patient was doing well until Sunday at 2am when she woke up with sudden onset, severe, sharp abdominal pain. The pain wrapped across her abdomen and to her back. Nothing helped the pain. No fevers, chills, nausea, vomiting, or blood in the stool. She was having regular bowel movements with her last being Saturday. She was tolerating a soft diet at home but does not feel she drinks enough water. The pain persisted so she went to the ER for evaluation. CT showed possible ileus and moderate fluid in the abdomen with potential development into an abscess. Her pain resolved completely once IV fluids were started, and she feels back to normal now. Passing flatus.      Vital Signs:  /46 (BP Location: Left arm)   Pulse 52   Temp 98.5 °F (36.9 °C) (Oral)   Resp 18   Ht 157.5 cm (5' 2\")   Wt 144 lb (65.3 kg)   SpO2 98%   BMI 26.34 kg/m²          Assessment/Plan:      Ms. Becerra is a 73 year old female s/p hiatal hernia repair and fundoplication surgery 4/8/24 presenting with abdominal pain and finding of fluid collection on CT.      Patient was doing well until Sunday morning when she woke up with abdominal pain that has since resolved with IV fluids. CT showed possible ileus and moderate fluid in the abdomen with potential development into an abscess. No leukocytosis. She underwent IR drainage in which clear fluid was drained.      Imaging reviewed and fluid does not appear infected. Feel that her pain is mostly likely due to gas / dehydration. Patient is having BM and passing flatus so unlikely to be an ileus. Recommend starting simethicone and increase fluid intake. Patient had a lot of questions about appropriate fluid intake so will consult nutrition. Otherwise, okay to  discharge home from our standpoint once tolerating a soft diet.      -No acute surgical intervention needed  -Nutrition consult for education on appropriate fluid intake  -Start simethicone BID       I.R. NOTE  Procedure: Pelvic aspiration possible drain placement     Pre-Procedure Diagnosis:  Pelvic fluid collection, possible abscess     Post-Procedure Diagnosis: Same     Anesthesia:  Local and Sedation     Findings:  Aspiration of 70ml return of clear serous fluid. No gross signs of infection, unable to coil wire in fluid suggesting lack of wall. No drain placed     Specimens: Fluid to micro     Blood Loss:  <5ml                           Tourniquet Time: 0  Complications:  None  Drains:  None     Secondary Diagnosis:  None     HOSPITALIST NOTE       Subjective:  Patient denies abdominal pain. No nausea or vomiting. Tolerating CLD.      Vital signs:  Temp:  [97.9 °F (36.6 °C)-98.5 °F (36.9 °C)] 97.9 °F (36.6 °C)  Pulse:  [52-63] 62  Resp:  [16-20] 16  BP: (123-152)/(47-82) 148/70  SpO2:  [94 %-98 %] 96 %         Assessment & Plan:  Sepsis d/t intraabdominal infection, ruled out. Early abscess? IR drain clear, serous fluid, no signs of infection.  IVF - decrease rate  Stop abx  Monitor   Ileus vs developing SBO  Bowel care  Robotic hiatal hernia repair and fundoplication 2024  CLD, advance to full liquids today  Simethicone  Essential hypertension  Resume Losartan  Dyslipidemia  History of cervical cancer sp partial hysterectomy     CTS NOTE    SUBJECTIVE/24H EVENTS:     No acute events overnight. Had a sinus headache and nausea yesterday. This is typical of her sinus headaches and has since resolved. The headaches / nausea were present before surgery. Tolerating clears. Does not have much of an appetite. No pain. + flatus.        Objective:      VITALS:     Temp (24hrs), Av.2 °F (36.8 °C), Min:98 °F (36.7 °C), Max:98.5 °F (36.9 °C)   /59 (BP Location: Left arm)   Pulse 62   Temp 98.5 °F  (36.9 °C) (Oral)   Resp 16   Ht 157.5 cm (5' 2\")   Wt 144 lb (65.3 kg)   SpO2 95%   BMI 26.34 kg/m²        Assessment/Plan:      73 year old female s/p hiatal hernia repair and fundoplication who presented yesterday with abdominal pain. Pain thought to be from dehydration / gas pain. Abdominal fluid collection drained yesterday and did not appear to be infected. Pain resolved after IVF.     -Simethicone BID  -Advance to a soft diet.   -Ambulate 3-4 times per day in the halls. Spend majority of day out of bed, in chair. Encouraged cough and IS use 10x hourly.

## 2024-05-14 NOTE — PLAN OF CARE
Patient alert and oriented x4, vital signs stable on room air. No pain or nausea reported. IV abx infusing. Up ad solis. Safety measures in place, questions addressed, plan of care discussed with patient.

## 2024-05-14 NOTE — PLAN OF CARE
Problem: Patient/Family Goals  Goal: Patient/Family Long Term Goal  Description: Patient's Long Term Goal: discharge home    Interventions:  - iv abx, drain placement, full liquid diet  - See additional Care Plan goals for specific interventions  Outcome: Progressing  Goal: Patient/Family Short Term Goal  Description: Patient's Short Term Goal: no pain    Interventions:   - pain meds prn  - See additional Care Plan goals for specific interventions  Outcome: Progressing     Problem: GASTROINTESTINAL - ADULT  Goal: Minimal or absence of nausea and vomiting  Description: INTERVENTIONS:  - Maintain adequate hydration with IV or PO as ordered and tolerated  - Nasogastric tube to low intermittent suction as ordered  - Evaluate effectiveness of ordered antiemetic medications  - Provide nonpharmacologic comfort measures as appropriate  - Advance diet as tolerated, if ordered  - Obtain nutritional consult as needed  - Evaluate fluid balance  Outcome: Progressing  Goal: Maintains or returns to baseline bowel function  Description: INTERVENTIONS:  - Assess bowel function  - Maintain adequate hydration with IV or PO as ordered and tolerated  - Evaluate effectiveness of GI medications  - Encourage mobilization and activity  - Obtain nutritional consult as needed  - Establish a toileting routine/schedule  - Consider collaborating with pharmacy to review patient's medication profile  Outcome: Progressing     Problem: PAIN - ADULT  Goal: Verbalizes/displays adequate comfort level or patient's stated pain goal  Description: INTERVENTIONS:  - Encourage pt to monitor pain and request assistance  - Assess pain using appropriate pain scale  - Administer analgesics based on type and severity of pain and evaluate response  - Implement non-pharmacological measures as appropriate and evaluate response  - Consider cultural and social influences on pain and pain management  - Manage/alleviate anxiety  - Utilize distraction and/or relaxation  techniques  - Monitor for opioid side effects  - Notify MD/LIP if interventions unsuccessful or patient reports new pain  - Anticipate increased pain with activity and pre-medicate as appropriate  Outcome: Progressing

## 2024-05-14 NOTE — PROGRESS NOTES
Thoracic Surgery Progress Note     Libertad Becerra is a 73 year old female. MRN [id number]. Admitted 2024    SUBJECTIVE/24H EVENTS:     No acute events overnight. Had a sinus headache and nausea yesterday. This is typical of her sinus headaches and has since resolved. The headaches / nausea were present before surgery. Tolerating clears. Does not have much of an appetite. No pain. + flatus.      Objective:     VITALS:     Temp (24hrs), Av.2 °F (36.8 °C), Min:98 °F (36.7 °C), Max:98.5 °F (36.9 °C)   /59 (BP Location: Left arm)   Pulse 62   Temp 98.5 °F (36.9 °C) (Oral)   Resp 16   Ht 157.5 cm (5' 2\")   Wt 144 lb (65.3 kg)   SpO2 95%   BMI 26.34 kg/m²     EXAM:   General: well appearing female in no acute distress  Heart: regular rate and rhythm.   Lungs: Normal respiratory effort. Equal chest rise bilaterally  Abdomen: Soft, Non-tender, non-distended.  Extremities: No clubbing or cyanosis. No lateralizing weakness  Neuro: no focal defects  Psych:  oriented to person place and time, normal mood and affect      Intake/Output Summary (Last 24 hours) at 2024 0921  Last data filed at 2024 0917  Gross per 24 hour   Intake 3218 ml   Output 2650 ml   Net 568 ml         MEDS:   losartan  50 mg Oral Daily    docusate sodium  100 mg Oral BID    simethicone  80 mg Oral BID    enoxaparin  40 mg Subcutaneous Nightly       LABS:  Lab Results   Component Value Date    K 4.2 2024         Assessment/Plan:     73 year old female s/p hiatal hernia repair and fundoplication who presented yesterday with abdominal pain. Pain thought to be from dehydration / gas pain. Abdominal fluid collection drained yesterday and did not appear to be infected. Pain resolved after IVF.    -Simethicone BID  -Advance to a soft diet.   -Ambulate 3-4 times per day in the halls. Spend majority of day out of bed, in chair. Encouraged cough and IS use 10x hourly.    -Okay to discharge from thoracic surgery standpoint  Per primary    once cleared with other services and tolerating a soft diet.      Patient discussed with Dr. Powell.     MORRIS ZaidiC  Thoracic Surgery  Pager: [phone]

## 2024-05-14 NOTE — PROGRESS NOTES
Alert & oriented x4. Denies pain.Passing gas.Tolerated diet.Up in room and hallway.Plan of care discussed with patient . ALl questions and concerns addressed.

## 2024-05-15 VITALS
DIASTOLIC BLOOD PRESSURE: 59 MMHG | RESPIRATION RATE: 18 BRPM | WEIGHT: 144 LBS | HEART RATE: 67 BPM | SYSTOLIC BLOOD PRESSURE: 162 MMHG | BODY MASS INDEX: 26.5 KG/M2 | TEMPERATURE: 98 F | HEIGHT: 62 IN | OXYGEN SATURATION: 95 %

## 2024-05-15 PROCEDURE — 99239 HOSP IP/OBS DSCHRG MGMT >30: CPT | Performed by: HOSPITALIST

## 2024-05-15 NOTE — PROGRESS NOTES
[facility]   part of [facility]     Hospitalist Progress Note     Libertad Becerra Patient Status:  Inpatient    1950 MRN [id number]   Location [facility] 3NW-A Attending Magdy Paulino MD   Hosp Day # 3 PCP Tash Egan MD     Chief Complaint: Abdominal pain    Subjective:   Patient denies abdominal pain. No nausea or vomiting. Tolerating FLD. + BM    Current medications:   losartan  50 mg Oral Daily    docusate sodium  100 mg Oral BID    simethicone  80 mg Oral BID    enoxaparin  40 mg Subcutaneous Nightly       Objective:    Review of Systems:   10 point ROS completed and was negative, except for pertinent positive and negatives stated in subjective.    Vital signs:  Temp:  [97.8 °F (36.6 °C)-98.4 °F (36.9 °C)] 98.4 °F (36.9 °C)  Pulse:  [53-67] 67  Resp:  [16-18] 18  BP: (145-162)/(57-70) 162/59  SpO2:  [92 %-98 %] 95 %  Patient Weight for the past 72 hrs:   Weight   24 0441 144 lb (65.3 kg)   24 1203 144 lb (65.3 kg)     Physical Exam:    General: No acute distress.   Respiratory: Clear to auscultation bilaterally.  Cardiovascular: S1, S2. Regular rate and rhythm.   Abdomen: Soft, nontender, nondistended.  Positive bowel sounds.   Extremities: No edema.  Neuro: AAOx3    Diagnostic Data:    Labs:  Recent Labs   Lab 24  0447 24  0607 24  0753   WBC 7.3 5.6  --    HGB 14.5 11.3*  --    MCV 91.7 92.7  --    .0 215.0  --    INR  --   --  1.12       Recent Labs   Lab 24  0447 24  0607 24  2116   * 78  --    BUN 11 6*  --    CREATSERUM 0.72 0.54*  --    CA 9.3 7.9*  --    ALB 3.9 2.8*  --     141  --    K 3.4* 3.6 4.2    112  --    CO2 27.0 22.0  --    ALKPHO 79 57  --    AST 19 16  --    ALT 15 11*  --    BILT 0.5 0.8  --    TP 7.4 5.5*  --        Estimated Creatinine Clearance: 72.3 mL/min (A) (based on SCr of 0.54 mg/dL (L)).    Recent Labs   Lab 24  0753   PTP 14.4   INR 1.12            COVID-19 Lab  Results    COVID-19  No results found for: \"COVID19\"    Pro-Calcitonin  No results for input(s): \"PCT\" in the last 168 hours.    Cardiac  No results for input(s): \"TROP\", \"PBNP\" in the last 168 hours.    Creatinine Kinase  No results for input(s): \"CK\" in the last 168 hours.    Inflammatory Markers  No results for input(s): \"CRP\", \"GÉNESIS\", \"LDH\", \"DDIMER\" in the last 168 hours.    Recent Labs   Lab 05/12/24  0447   TROPHS <3       Imaging: Imaging data reviewed in Epic.    Medications:    losartan  50 mg Oral Daily    docusate sodium  100 mg Oral BID    simethicone  80 mg Oral BID    enoxaparin  40 mg Subcutaneous Nightly       Assessment & Plan:    Sepsis d/t intraabdominal infection, ruled out. Early abscess? IR drain clear, serous fluid, no signs of infection.  Ileus vs developing SBO, resolved  Robotic hiatal hernia repair and fundoplication 4/2024  Stop IVF  Simethicone  Advance diet to soft  Essential hypertension  Losartan  Dyslipidemia  History of cervical cancer sp partial hysterectomy    Supplementary Documentation:   Quality:  DVT Prophylaxis: Lovenox    Home if tolerating soft diet.    Plan of care discussed with patient.    Douglas Magdaleno MD

## 2024-05-15 NOTE — PLAN OF CARE
Patient is alert and oriented. On room air. Abdomen is soft/ non-distended. Patient states passing gas. Patient had a BM this morning. Voiding freely.

## 2024-05-15 NOTE — PROGRESS NOTES
NURSING DISCHARGE NOTE    Discharged Home via Ambulatory.  Accompanied by Spouse  Belongings Taken by patient/family.    IV taken out. Discharge instructions given to patient. Patient verbalized understanding.

## 2024-05-15 NOTE — PLAN OF CARE
Alert and oriented x4.   Full liquid diet, tolerating. No orders to advance diet. + Gas, simethicone per order. Colace scheduled.  IV fluids infusing. SCDs in place. Up independently, voiding clear yellow urine. NSR on telemetry.

## 2024-05-15 NOTE — DISCHARGE SUMMARY
[facility]     Libertad Becerra Patient Status:  Inpatient    1950 MRN [id number]   Location [facility] 3NW-A Attending Douglas Magdaleno MD   Hosp Day # 3 PCP Tash Egan MD     Date of Admission: 2024  Date of Discharge:   5/15/2024    Discharge Disposition: Home or Self Care    Discharge Diagnosis:  Sepsis d/t intraabdominal infection, ruled out. Early abscess? IR drain clear, serous fluid, no signs of infection.  Ileus vs developing SBO, resolved  Robotic hiatal hernia repair and fundoplication 2024  Essential hypertension  Dyslipidemia  History of cervical cancer sp partial hysterectomy    History of Present Illness: Libertad Becerra is a 73 year old female with onset acutely of abdominal pain around 2am in epigastric, central chest and just above umbilicus.  Was hypothermic in ER.  After fluids she feels much better.  Lactic acid elevated.  Denies cough or dysuria.  Felt chilled but denies fevers.     Brief Synopsis: Patient presented with abdominal pain. She was initially admitted for sepsis d/t intraabdominal infection which was ruled out. Patient thought to have early abscess. IR consulted and fluid drained which was drain clear, serous fluid, no signs of infection. Patient clinically improved. Diet advanced and tolerated. Home in stable condition.    Lace+ Score: 41  59-90 High Risk  29-58 Medium Risk  0-28   Low Risk       TCM Follow-Up Recommendation:  LACE 29-58: Moderate Risk of readmission after discharge from the hospital.    Discharge Medication List:     Discharge Medications        START taking these medications        Instructions Prescription details   simethicone 80 MG Chew  Commonly known as: Mylicon      Chew 1 tablet (80 mg total) by mouth in the morning and 1 tablet (80 mg total) before bedtime.   Quantity: 60 tablet  Refills: 0            CONTINUE taking these medications        Instructions Prescription details   acetaminophen 325 MG  Tabs  Commonly known as: Tylenol      Take 1 tablet (325 mg total) by mouth every 6 (six) hours as needed for Pain.   Refills: 0     losartan 50 MG Tabs  Commonly known as: Cozaar      TAKE 1 TABLET EVERY DAY   Quantity: 90 tablet  Refills: 3     Vitamin D 1000 units Tabs      Take 2,000 Units by mouth daily.   Refills: 0            STOP taking these medications      HYDROcodone-acetaminophen 7.5-325 MG/15ML Soln        Naloxone HCl 4 MG/0.1ML Liqd                  Where to Get Your Medications        Information about where to get these medications is not yet available    Ask your nurse or doctor about these medications  simethicone 80 MG Chew         [place]: [place]    Follow-up appointment:   Andre Jimenez, Gregorio CHAUHAN MD  [address] [facility] [address] [phone]    Follow up  As needed    Appointments for Next 30 Days 2024 - 6/15/2024        Date Arrival Time Visit Type Length Department Provider     2024 12:00 PM  FOLLOW UP-HEM/ONC [1171] 15 min [facility] in [facility]    Patient Instructions:         Location Instructions:     **IF YOU NEED LABWORK OR AN INFUSION ALONG WITH YOUR APPOINTMENT, YOU MUST CALL TO SCHEDULE.**  Your appointment is on the [facility] campus in the Cancer [place]. The address is [address] [place] [address]. Please register at the [facility]  on the second floor.  Masks are optional for all patients and visitors, unless otherwise indicated.                          -----------------------------------------------------------------------------------------------  PATIENT DISCHARGE INSTRUCTIONS: See electronic chart    Douglas Magdaleno MD    Total time spent on discharge plannin minutes     The  Cures Act makes medical notes like these available to patients in the interest of transparency. Please be advised this is a medical document. Medical documents are intended to carry relevant information,  facts as evident, and the clinical opinion of the practitioner. The medical note is intended as peer to peer communication and may appear blunt or direct. It is written in medical language and may contain abbreviations or verbiage that are unfamiliar.

## 2024-05-16 ENCOUNTER — PATIENT OUTREACH (OUTPATIENT)
Dept: CASE MANAGEMENT | Age: 74
End: 2024-05-16

## 2024-05-16 ENCOUNTER — TELEPHONE (OUTPATIENT)
Dept: INTERNAL MEDICINE CLINIC | Facility: CLINIC | Age: 74
End: 2024-05-16

## 2024-05-16 NOTE — PROGRESS NOTES
LM for pt to call [facility] for TCM since discharge. [facility] phone number was provided for pt to call back.    TE will be sent to PCP office to FU on TCM appt.

## 2024-05-16 NOTE — TELEPHONE ENCOUNTER
Attempted to contact pt for today however no answer.  Patient does not have an appointment scheduled at this time.  TCM appointment recommended by 5/29/24 as patient is a Moderate risk for readmission.  Please advise.    BOOK BY DATE (last date for TCM): 5/29/24    Clinical staff:  Please follow-up with patient and try to get them to schedule as patient would greatly benefit from TCM appointment.  Thank you!

## 2024-05-21 NOTE — PAYOR COMM NOTE
--------------  DISCHARGE REVIEW    [place] [facility]:  [id number]  Authorization Number: [id number]    Admit date: 24  Admit time:  11:54 AM  Discharge Date: 5/15/2024 12:16 PM     Admitting Physician: Magdy Salazar MD  Attending Physician:  No att. providers found  Primary Care Physician: Tash Egan MD          Discharge Summary Notes        Discharge Summary signed by Douglas Magdaleno MD at 2024  1:08 PM       Author: Douglas Magdaleno MD Specialty: HOSPITALIST, Internal Medicine Author Type: Physician    Filed: 2024  1:08 PM Date of Service: 5/15/2024 10:33 AM Status: Signed    : Douglas Magdaleno MD (Physician)           [facility]  DISCHARGE SUMMARY     Libertad Becerra Patient Status:  Inpatient    1950 MRN [id number]   Location [facility] 3N-A Attending Douglas Magdaleno MD   Hosp Day # 3 PCP Tash Egan MD     Date of Admission: 2024  Date of Discharge:   5/15/2024    Discharge Disposition: Home or Self Care    Discharge Diagnosis:  Sepsis d/t intraabdominal infection, ruled out. Early abscess? IR drain clear, serous fluid, no signs of infection.  Ileus vs developing SBO, resolved  Robotic hiatal hernia repair and fundoplication 2024  Essential hypertension  Dyslipidemia  History of cervical cancer sp partial hysterectomy    History of Present Illness: Libertad Becerra is a 73 year old female with onset acutely of abdominal pain around 2am in epigastric, central chest and just above umbilicus.  Was hypothermic in ER.  After fluids she feels much better.  Lactic acid elevated.  Denies cough or dysuria.  Felt chilled but denies fevers.     Brief Synopsis: Patient presented with abdominal pain. She was initially admitted for sepsis d/t intraabdominal infection which was ruled out. Patient thought to have early abscess. IR consulted and fluid drained which was drain clear, serous fluid, no signs of infection. Patient clinically improved.  Diet advanced and tolerated. Home in stable condition.    Lace+ Score: 41  59-90 High Risk  29-58 Medium Risk  0-28   Low Risk       TCM Follow-Up Recommendation:  LACE 29-58: Moderate Risk of readmission after discharge from the hospital.    Discharge Medication List:     Discharge Medications        START taking these medications        Instructions Prescription details   simethicone 80 MG Chew  Commonly known as: Mylicon      Chew 1 tablet (80 mg total) by mouth in the morning and 1 tablet (80 mg total) before bedtime.   Quantity: 60 tablet  Refills: 0            CONTINUE taking these medications        Instructions Prescription details   acetaminophen 325 MG Tabs  Commonly known as: Tylenol      Take 1 tablet (325 mg total) by mouth every 6 (six) hours as needed for Pain.   Refills: 0     losartan 50 MG Tabs  Commonly known as: Cozaar      TAKE 1 TABLET EVERY DAY   Quantity: 90 tablet  Refills: 3     Vitamin D 1000 units Tabs      Take 2,000 Units by mouth daily.   Refills: 0            STOP taking these medications      HYDROcodone-acetaminophen 7.5-325 MG/15ML Soln        Naloxone HCl 4 MG/0.1ML Liqd                  Where to Get Your Medications        Information about where to get these medications is not yet available    Ask your nurse or doctor about these medications  simethicone 80 MG Chew         [place]    Follow-up appointment:   Andre Jimenez, Gregorio CHAUHAN MD  [address] [facility] [id number] [phone]    Follow up  As needed    Appointments for Next 30 Days 5/16/2024 - 6/15/2024        Date Arrival Time Visit Type Length Department Provider     5/22/2024 12:00 PM  FOLLOW UP-HEM/ONC [2451] 15 min [facility] [place] [facility]    Patient Instructions:         Location Instructions:     **IF YOU NEED LABWORK OR AN INFUSION ALONG WITH YOUR APPOINTMENT, YOU MUST CALL TO SCHEDULE.**  Your appointment is on the [facility] campus in the Cancer Center. The  address is [address] [place] [address]. Please register at the Cancer Center  on the second floor.  Masks are optional for all patients and visitors, unless otherwise indicated.                          -----------------------------------------------------------------------------------------------  PATIENT DISCHARGE INSTRUCTIONS: See electronic chart    Douglas Magdaleno MD    Total time spent on discharge plannin minutes     The 21st Century Cures Act makes medical notes like these available to patients in the interest of transparency. Please be advised this is a medical document. Medical documents are intended to carry relevant information, facts as evident, and the clinical opinion of the practitioner. The medical note is intended as peer to peer communication and may appear blunt or direct. It is written in medical language and may contain abbreviations or verbiage that are unfamiliar.     Electronically signed by Douglas Magdaleno MD on 2024  1:08 PM         REVIEWER COMMENTS

## 2024-05-22 ENCOUNTER — OFFICE VISIT (OUTPATIENT)
Dept: HEMATOLOGY/ONCOLOGY | Facility: HOSPITAL | Age: 74
End: 2024-05-22
Attending: THORACIC SURGERY (CARDIOTHORACIC VASCULAR SURGERY)

## 2024-05-22 VITALS
BODY MASS INDEX: 25.69 KG/M2 | TEMPERATURE: 98 F | RESPIRATION RATE: 16 BRPM | HEART RATE: 71 BPM | HEIGHT: 62.99 IN | OXYGEN SATURATION: 96 % | DIASTOLIC BLOOD PRESSURE: 81 MMHG | SYSTOLIC BLOOD PRESSURE: 174 MMHG | WEIGHT: 145 LBS

## 2024-05-22 DIAGNOSIS — K44.9 HIATAL HERNIA: Primary | ICD-10-CM

## 2024-05-22 NOTE — PROGRESS NOTES
Thoracic Surgery Post-Op Progress Note    Ms. Becerra is a 74 year old female who presents today for second follow up after a robotic assisted hiatal hernia repair and fundoplication performed on 4/8/24. Since her last visit, she was hospitalized for abdominal pain and dehydration which has since resolved. She has been drinking more fluids and is doing well. She complains of nothing. She denies fevers or chills. No cough. No hemoptysis. Her breathing has improved since surgery. No worsening pain, swelling, or drainage from wounds. She is tolerating a soft diet without dysphagia, nausea, or reflux symptoms. She has been staying active and using incentive spirometry at home.    PMH:  Past Medical History:    Abdominal hernia    Abdominal pain    Arthritis    Back pain    Back problem    Belching    Body piercing    CANCER    cervical cancer-partial hysterectomy    cataract s/p surgery    Chest pain    Fatigue    Flatulence/gas pain/belching    Frequent urination    Heartburn    Hemorrhoids    Hiatal hernia    High blood pressure    High cholesterol    History of blood transfusion    FEW YEARS AGO    Indigestion    Leaking of urine    Night sweats    Pain in joints    Problems with swallowing    Shortness of breath    Sleep disturbance    Uncomfortable fullness after meals    Wears glasses    Weight loss       Meds:    Current Outpatient Medications:     simethicone 80 MG Oral Chew Tab, Chew 1 tablet (80 mg total) by mouth in the morning and 1 tablet (80 mg total) before bedtime., Disp: 60 tablet, Rfl: 0    acetaminophen 325 MG Oral Tab, Take 1 tablet (325 mg total) by mouth every 6 (six) hours as needed for Pain., Disp: , Rfl:     LOSARTAN 50 MG Oral Tab, TAKE 1 TABLET EVERY DAY, Disp: 90 tablet, Rfl: 3    Cholecalciferol (VITAMIN D) 1000 units Oral Tab, Take 2,000 Units by mouth daily., Disp: , Rfl:     Vital Signs:    BP (!) 174/81 (BP Location: Left arm, Patient Position: Sitting, Cuff Size: adult)   Pulse 71    Temp 97.7 °F (36.5 °C) (Temporal)   Resp 16   Ht 1.6 m (5' 2.99\")   Wt 65.8 kg (145 lb)   SpO2 (!) 71%   BMI 25.69 kg/m²   SpO2 rechecked and 96%    Physical Exam:    General: well appearing female in no acute distress  HEENT: Normocephalic, PERRL, EOMI  Heart: regular rate and rhythm. No murmurs, rubs or gallops. No lower extremity edema.  Lungs: Normal respiratory effort. Clear to ascultation bilaterally.  Abdomen: Soft, Non-tender, non-distended. incisions are clean, dry and intact. No signs of infection.   Extremities: No clubbing or cyanosis. No lateralizing weakness  Neuro: No gross cranial nerve defects, no loss of sensation  Psych: oriented to person place and time, normal mood and affect    Pathology:  Final Diagnosis:   \"Hernia sac\", excision:  -Fibrovascular and adipose tissue, partially lined by mesothelium, consistent with hernia sac.     Assesment:    Ms. Becerra is a 74 year old female who presents today for second follow up after a robotic assisted hiatal hernia repair and fundoplication performed on April 8th, 2024 for a large, symptomatic hiatal hernia. She continues to do quite well. She was admitted for pain and hypotthermia.  There did not seem to be any post-surgical cause for this and it has not recurred after hydration and simethicone use. Ms. Becerra has had no issues with a soft diet and Is okay to transition to a general diet.  I counseled her that there may be some lingering dysphagia for up to 3 months post-op as she heals.  If there are any problems after that, she should see me again. Long term follow up and with Dr. Beaver.     Plan:  Encouraged continued exercise and incentive spirometer use.  No activity restrictions   Advance to a general diet   Ms. Becerra should follow up with thoracic surgery on an as needed basis. She is encouraged to call with any further questions or concerns.     Gregorio Powell M.D.  Thoracic Surgery  Pager: [phone]

## 2024-05-22 NOTE — TELEPHONE ENCOUNTER
Spoke with patient who declined to schedule hospital follow up with Dr. Egan.  Patient is scheduled to follow up with her surgeon Dr. Leigh.

## 2024-05-25 NOTE — CDS QUERY
CLINICAL DOCUMENTATION CLARIFICATION FORM  Dr. Magdaleno:   Can a diagnosis be given for the fluid collection which was aspirated by interventional radiology?      ( ) Abdominal/pelvic seroma related to recent hiatal hernia repair/fundiplication  ( ) Abdominal/pelvic seroma of unknown etiology  ( ) Other - please specify         ED note: pain at hiatal hernia area that she had surgery on last month.  h&p: Other surgical history 04/08/2024 robotic assisted hiatal hernia repair and fundoplication by Dr. Powell    5/13 procedure:   Findings: Aspiration of 70ml return of clear serous fluid.No gross signs of  infection, unable to coil wire in fluid suggesting lack of wall. No drain placed    d/c summary: Sepsis d/t intraabdominal infection, ruled out. Early abscess?IR drain clear, serous fluid, no signs of infection.    If you have any questions, please contact Clinical : Yuki Shaw RN, CDS  at cell: [phone]. Thank You!  THIS FORM IS A PERMANENT PART OF THE MEDICAL RECORD

## 2024-10-07 ENCOUNTER — TELEPHONE (OUTPATIENT)
Dept: INTERNAL MEDICINE CLINIC | Facility: CLINIC | Age: 74
End: 2024-10-07

## 2024-10-07 NOTE — TELEPHONE ENCOUNTER
Reached patient for medication adherence consult. Patient overdue for refill on losartan per insurance report.    Patient reports that she stopped taking the medication.Patient states that she was having trouble remembering when she was having other health concerns and then just stopped altogether. Discussed monitoring blood pressure. Recommended that patient resume medication. Patient to reach out with further concerns.     Provided education on importance of adherence. Patient denies any questions or concerns with medication.

## 2024-10-24 ENCOUNTER — OFFICE VISIT (OUTPATIENT)
Dept: INTERNAL MEDICINE CLINIC | Facility: CLINIC | Age: 74
End: 2024-10-24
Payer: MEDICARE

## 2024-10-24 VITALS
RESPIRATION RATE: 18 BRPM | HEIGHT: 62.99 IN | BODY MASS INDEX: 27.11 KG/M2 | DIASTOLIC BLOOD PRESSURE: 72 MMHG | SYSTOLIC BLOOD PRESSURE: 138 MMHG | HEART RATE: 85 BPM | OXYGEN SATURATION: 95 % | WEIGHT: 153 LBS

## 2024-10-24 DIAGNOSIS — R10.31 BILATERAL LOWER ABDOMINAL CRAMPING: ICD-10-CM

## 2024-10-24 DIAGNOSIS — I10 BENIGN ESSENTIAL HTN: ICD-10-CM

## 2024-10-24 DIAGNOSIS — R10.32 BILATERAL LOWER ABDOMINAL CRAMPING: ICD-10-CM

## 2024-10-24 DIAGNOSIS — M77.8 RIGHT WRIST TENDONITIS: Primary | ICD-10-CM

## 2024-10-24 PROCEDURE — 3078F DIAST BP <80 MM HG: CPT | Performed by: INTERNAL MEDICINE

## 2024-10-24 PROCEDURE — 1125F AMNT PAIN NOTED PAIN PRSNT: CPT | Performed by: INTERNAL MEDICINE

## 2024-10-24 PROCEDURE — 1159F MED LIST DOCD IN RCRD: CPT | Performed by: INTERNAL MEDICINE

## 2024-10-24 PROCEDURE — 3008F BODY MASS INDEX DOCD: CPT | Performed by: INTERNAL MEDICINE

## 2024-10-24 PROCEDURE — 99214 OFFICE O/P EST MOD 30 MIN: CPT | Performed by: INTERNAL MEDICINE

## 2024-10-24 PROCEDURE — 3075F SYST BP GE 130 - 139MM HG: CPT | Performed by: INTERNAL MEDICINE

## 2024-10-24 PROCEDURE — 1170F FXNL STATUS ASSESSED: CPT | Performed by: INTERNAL MEDICINE

## 2024-10-24 PROCEDURE — G2211 COMPLEX E/M VISIT ADD ON: HCPCS | Performed by: INTERNAL MEDICINE

## 2024-10-24 RX ORDER — NAPROXEN 500 MG/1
500 TABLET ORAL 2 TIMES DAILY WITH MEALS
Qty: 30 TABLET | Refills: 0 | Status: SHIPPED | OUTPATIENT
Start: 2024-10-24

## 2024-10-24 RX ORDER — DICYCLOMINE HYDROCHLORIDE 10 MG/1
10 CAPSULE ORAL 3 TIMES DAILY PRN
Qty: 30 CAPSULE | Refills: 0 | Status: SHIPPED | OUTPATIENT
Start: 2024-10-24 | End: 2024-11-03

## 2024-10-24 RX ORDER — LOSARTAN POTASSIUM 50 MG/1
50 TABLET ORAL DAILY
Qty: 90 TABLET | Refills: 1 | Status: SHIPPED | OUTPATIENT
Start: 2024-10-24

## 2024-10-24 NOTE — PROGRESS NOTES
Libertad Becerra is a 74 year old female.    Chief Complaint   Patient presents with    Hand Pain     R hand and wrist pain for the past 3 weeks, was out in the garden and thinks she might have been out there too long. The only thing that makes it feel better is not using it. States when it is aching badly at times the pain will even shoot up her arm     Blood Pressure    Abdominal Pain       HPI:     Pleasant patient with HTN, HL, GERD, hiatal hernia repair in April of this year here for several issues-  C/o right wrist and thumb pain for last 3 weeks. She was gardening and pulling weeds and picking up branches for 45 min. She thinks she may have overdone it.  No falls or injury to wrist/hands. Rest helps her symptoms. Worse if she tries to use her wrist or right thumb.  C/o labile BP in the last 3-4 months, she stopped taking losartan for some time but is back on it. BP seems to be doing much better, 138/72 today. No CP/palp  C/o 2 episodes of lower abdominal cramps since her hiatal hernia repair in April. She says cramps are intense, no associated n/v/diarrhea. +constipation. Last episode was 10/3/24      Patient Active Problem List   Diagnosis    Other hyperlipidemia    Environmental allergies    History of cervical cancer    Benign essential HTN    DDD (degenerative disc disease), lumbar    Cervical radiculopathy    Gastroesophageal reflux disease    Dense breast tissue on mammogram    Dysphagia, unspecified    History of adenomatous polyp of colon    Family history of colon cancer    Hyperlipidemia    Primary hypertension    Hiatal hernia    Hypothermia    Lactic acid acidosis    Hypernatremia    Hypokalemia    Hyperglycemia    Sepsis due to undetermined organism without resultant organ failure (HCC)    Abdominal pain of unknown etiology    Intra-abdominal abscess (HCC)     Current Outpatient Medications   Medication Sig Dispense Refill    naproxen 500 MG Oral Tab Take 1 tablet (500 mg total) by mouth 2  (two) times daily with meals. 30 tablet 0    dicyclomine 10 MG Oral Cap Take 1 capsule (10 mg total) by mouth 3 (three) times daily as needed (cramps). 30 capsule 0    LOSARTAN 50 MG Oral Tab TAKE 1 TABLET EVERY DAY 90 tablet 3    Cholecalciferol (VITAMIN D) 1000 units Oral Tab Take 2,000 Units by mouth daily.      simethicone 80 MG Oral Chew Tab Chew 1 tablet (80 mg total) by mouth in the morning and 1 tablet (80 mg total) before bedtime. (Patient not taking: Reported on 10/24/2024) 60 tablet 0      Past Medical History:    Abdominal hernia    Abdominal pain    Arthritis    Back pain    Back problem    Belching    Body piercing    CANCER    cervical cancer-partial hysterectomy    cataract s/p surgery    Chest pain    Fatigue    Flatulence/gas pain/belching    Frequent urination    Heartburn    Hemorrhoids    Hiatal hernia    High blood pressure    High cholesterol    History of blood transfusion    FEW YEARS AGO    Indigestion    Leaking of urine    Night sweats    Pain in joints    Problems with swallowing    Shortness of breath    Sleep disturbance    Uncomfortable fullness after meals    Wears glasses    Weight loss      Social History:  Social History     Socioeconomic History    Marital status:    Tobacco Use    Smoking status: Never    Smokeless tobacco: Never   Vaping Use    Vaping status: Never Used   Substance and Sexual Activity    Alcohol use: No     Alcohol/week: 0.0 standard drinks of alcohol    Drug use: No    Sexual activity: Never   Other Topics Concern    Caffeine Concern No    Exercise No     Comment: biking    Seat Belt Yes     Social Drivers of Health     Financial Resource Strain: Low Risk  (4/10/2024)    Financial Resource Strain     Difficulty of Paying Living Expenses: Not hard at all     Med Affordability: No   Food Insecurity: No Food Insecurity (5/12/2024)    Food Insecurity     Food Insecurity: Never true   Transportation Needs: No Transportation Needs (5/12/2024)     Transportation Needs     Lack of Transportation: No   Housing Stability: Low Risk  (5/12/2024)    Housing Stability     Housing Instability: No     Family History   Problem Relation Age of Onset    Cancer Father     Colon Cancer Father     Cancer Mother     Breast Cancer Mother 70        early to mid 70's    Cancer Paternal Grandmother     Other (heart  problem) Maternal Grandmother         Allergies  Allergies[1]      REVIEW OF SYSTEMS:   GENERAL HEALTH:  no fevers   RESPIRATORY: no cough  CARDIOVASCULAR: denies chest pain  GI: as above  : no dysuria  NEURO: denies headaches  PSYCH: No reported depression   HEME: No adenopathy      EXAM:   /72   Pulse 85   Resp 18   Ht 5' 2.99\" (1.6 m)   Wt 153 lb (69.4 kg)   SpO2 95%   BMI 27.11 kg/m²   GENERAL: well developed, NAD  LUNGS: normal rate without respiratory distress, lungs clear to auscultation  CARDIO: RRR nl S1 S2  GI: normal bowel sounds, soft, NT/ND  EXTREMITIES: right wrist without swelling, redness or warmth, reduced ROM due to pain  Pulses wnl,  strength wnl, negative tinels  NEURO: Alert and oriented, no focal deficits    ASSESSMENT AND PLAN:     Encounter Diagnoses   Name     Right wrist tendonitis- rest, naproxen with food for 1-2 weeks, wrist brace      Benign essential HTN- controlled on losartan,  CPM     Bilateral lower abdominal cramping- last episode was 3 weeks ago, can use dicyclomine prn. Advised to see SGI for recurrent symptoms        No orders of the defined types were placed in this encounter.      Meds & Refills for this Visit:  Requested Prescriptions     Signed Prescriptions Disp Refills    naproxen 500 MG Oral Tab 30 tablet 0     Sig: Take 1 tablet (500 mg total) by mouth 2 (two) times daily with meals.    dicyclomine 10 MG Oral Cap 30 capsule 0     Sig: Take 1 capsule (10 mg total) by mouth 3 (three) times daily as needed (cramps).       Imaging & Consults:  GASTRO - INTERNAL    Return if symptoms worsen or fail to  improve.  There are no Patient Instructions on file for this visit.      The patient indicates understanding of these issues and agrees to the plan.           [1]   Allergies  Allergen Reactions    Latex HIVES    Morphine Sulfate In Dextrose RASH     States skin turned orange.    Latex ITCHING

## 2024-10-29 ENCOUNTER — PATIENT MESSAGE (OUTPATIENT)
Dept: INTERNAL MEDICINE CLINIC | Facility: CLINIC | Age: 74
End: 2024-10-29

## 2024-10-29 ENCOUNTER — TELEPHONE (OUTPATIENT)
Dept: INTERNAL MEDICINE CLINIC | Facility: CLINIC | Age: 74
End: 2024-10-29

## 2024-10-29 NOTE — TELEPHONE ENCOUNTER
Received call from pt. Per pt, she had heart scan in March and was advised to follow-up after hernia surgery to discuss lung findings on CT over read. Patient calling to ask if Dr. Egan would still like to see her for follow-up on this. Was recently seen by Dr. Egan.    Dr. Egan- are you still wanting to see her to discuss lung findings on CT over read? Any follow-up imaging?

## 2024-10-30 NOTE — TELEPHONE ENCOUNTER
There was another CT after the heart scan which showed hiatal hernia and some subsegmental atelectasis, nothing major seen that requires a follow up from my side.

## 2024-10-30 NOTE — TELEPHONE ENCOUNTER
Called patient and informed no follow up needed. Patient informed of message she also sent    Asking if she bought the correct wrist brace   Brand name is [employer]. Stabilized wrist and thumb brace. The brace has a  malleable splint which helps with partial thumb   immobilization and rigid splint helps stabilize the  wrist and hand.    Please advise on wrist brace    Please call patient with response if this is the correct brace or not

## 2024-11-01 NOTE — TELEPHONE ENCOUNTER
Tash Egan MD to [facility] Clinical Staff     10/30/24  4:55 PM  Note     Wrist brace is correct one

## 2024-11-15 NOTE — TELEPHONE ENCOUNTER
LFV:3/11/19 with TB for acute visit  Future Appt: none on file  Last Rx:3/11/19 for #30 w/3  Last Labs:2/13/19 cbc, 10/29/18 complete cpe labs and iron studies  Per protocol to provider details… soft/nontender/no guarding/no rigidity/distended/ascites/hepatomegaly

## 2024-11-18 ENCOUNTER — TELEPHONE (OUTPATIENT)
Dept: ORTHOPEDICS CLINIC | Facility: CLINIC | Age: 74
End: 2024-11-18

## 2024-11-18 DIAGNOSIS — M79.641 RIGHT HAND PAIN: Primary | ICD-10-CM

## 2024-11-18 NOTE — TELEPHONE ENCOUNTER
Patient scheduled for right hand issues - no previous images.  Patient notified to come in 15 minutes prior to appointment. Please order x-rays if appropriate   Future Appointments   Date Time Provider Department Center   11/25/2024  1:30 PM Uriah White MD [facility]   3/12/2025  9:00 AM Tash Egan MD [facility]

## 2024-11-19 DIAGNOSIS — I10 BENIGN ESSENTIAL HTN: Primary | ICD-10-CM

## 2024-11-19 DIAGNOSIS — Z00.00 ROUTINE GENERAL MEDICAL EXAMINATION AT A HEALTH CARE FACILITY: ICD-10-CM

## 2024-11-19 DIAGNOSIS — E78.49 OTHER HYPERLIPIDEMIA: ICD-10-CM

## 2024-11-25 ENCOUNTER — HOSPITAL ENCOUNTER (OUTPATIENT)
Dept: GENERAL RADIOLOGY | Age: 74
Discharge: HOME | End: 2024-11-25
Attending: STUDENT IN AN ORGANIZED HEALTH CARE EDUCATION/TRAINING PROGRAM
Payer: MEDICARE

## 2024-11-25 ENCOUNTER — OFFICE VISIT (OUTPATIENT)
Dept: ORTHOPEDICS CLINIC | Facility: CLINIC | Age: 74
End: 2024-11-25
Payer: MEDICARE

## 2024-11-25 DIAGNOSIS — M79.641 RIGHT HAND PAIN: ICD-10-CM

## 2024-11-25 DIAGNOSIS — M19.031 ARTHRITIS OF RIGHT WRIST: Primary | ICD-10-CM

## 2024-11-25 PROCEDURE — 1159F MED LIST DOCD IN RCRD: CPT | Performed by: STUDENT IN AN ORGANIZED HEALTH CARE EDUCATION/TRAINING PROGRAM

## 2024-11-25 PROCEDURE — 99203 OFFICE O/P NEW LOW 30 MIN: CPT | Performed by: STUDENT IN AN ORGANIZED HEALTH CARE EDUCATION/TRAINING PROGRAM

## 2024-11-25 PROCEDURE — 73130 X-RAY EXAM OF HAND: CPT | Performed by: STUDENT IN AN ORGANIZED HEALTH CARE EDUCATION/TRAINING PROGRAM

## 2024-11-25 PROCEDURE — 1160F RVW MEDS BY RX/DR IN RCRD: CPT | Performed by: STUDENT IN AN ORGANIZED HEALTH CARE EDUCATION/TRAINING PROGRAM

## 2024-11-25 NOTE — PROGRESS NOTES
Clinic Note     Allergies[1]    CC: right base of thumb pain.    HPI: This 74 year old RHD female presents with right base of thumb pain. Ongoing for a couple of months. She has recently started thumb spica bracing and states that this has given her nice relief thus far. No numbness or tingling. She has pain with gripping and opening jars especially.    Onset: gradual over the past couple of months    Treatments Tried: bracing    Past Medical History:    Abdominal hernia    Abdominal pain    Allergic rhinitis    Arthritis    Back pain    Back problem    Belching    Body piercing    CANCER    cervical cancer-partial hysterectomy    cataract s/p surgery    Chest pain    Esophageal reflux    Essential hypertension    Fatigue    Flatulence/gas pain/belching    Frequent urination    Heartburn    Hemorrhoids    Hiatal hernia    High blood pressure    High cholesterol    History of blood transfusion    FEW YEARS AGO    Indigestion    Leaking of urine    Night sweats    Pain in joints    Problems with swallowing    Shortness of breath    Sleep disturbance    Uncomfortable fullness after meals    Wears glasses    Weight loss     Past Surgical History:   Procedure Laterality Date    Cataract      Colonoscopy  2014    Colonoscopy      D & c      Egd      Hysterectomy      partial hysterec          Other surgical history  2024    robotic assisted hiatal hernia repair and fundoplication by Dr. Powell    Sigmoidoscopy,diagnostic       Current Outpatient Medications   Medication Sig Dispense Refill    naproxen 500 MG Oral Tab Take 1 tablet (500 mg total) by mouth 2 (two) times daily with meals. (Patient not taking: Reported on 2024) 30 tablet 0    losartan 50 MG Oral Tab Take 1 tablet (50 mg total) by mouth daily. 90 tablet 1    simethicone 80 MG Oral Chew Tab Chew 1 tablet (80 mg total) by mouth in the morning and 1 tablet (80 mg total) before bedtime. (Patient not taking: Reported on 10/24/2024) 60  tablet 0    Cholecalciferol (VITAMIN D) 1000 units Oral Tab Take 2,000 Units by mouth daily.       Family History   Problem Relation Age of Onset    Cancer Father     Colon Cancer Father     Cancer Mother     Breast Cancer Mother 70        early to mid 70's    Cancer Paternal Grandmother     Other (heart  problem) Maternal Grandmother      Social History     Occupational History    Not on file   Tobacco Use    Smoking status: Never    Smokeless tobacco: Never   Vaping Use    Vaping status: Never Used   Substance and Sexual Activity    Alcohol use: No    Drug use: No    Sexual activity: Never        Review of Systems:  Negative unless stated in HPI.      Physical Exam:  There were no vitals taken for this visit.    Constitutional: NAD. AOx3. Well-developed and Well-nourished.   Psychiatric: Normal mood/ affect/ behavior. Judgment and thought content normal.     right Upper Extremity:     - Tenderness to palpation at the STT joint  - 0 degrees of hyperextension of the MCP joint  - equivocal grind test, (axial loading of the CMC joint)  - no pain elicited with translation of the thumb metacarpal on the trapezium    - Negative Finklestein's test  - No significant swelling over the 1st dorsal compartment  - No pain with resisted thumb extension  - No tenderness at the A1 pulley  - No locking and triggering of the thumb with ROM  - Sensation present to light touch in median, radial and ulnar sensory nerve distributions  - Motor intact to AIN, PIN, Ulnar motor nerve distributions  - Able to make a composite fist  - Able to fully extend digits  - Skin intact, warm and well perfused with brisk capillary refill    Diagnostic Studies:  I reviewed the images independently from the professional interpretation, and discussed my interpretation of the pertinent findings with patient/family.    11/25/2024 XR right thumb and wrist 3V: On my independent review of the radiographs, there is evidence of STT osteoarthritis with  subchondral sclerosis and severe joint space narrowing. There are mild osteoarthritic changes within the CMC joint as well.     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 I specifically reviewed the images noted above, independently and discussed my independent interpretation of these images in combination with the pertinent positive and negative findings in my clinical exam with the patient.    Today I discussed with Libertad Becerra the pathophysiology and pathoanatomy of STT arthritis, the natural history and the prognosis with various treatment options. I reviewed the radiographs and clinical exam findings and their significance with Libertad Becerra. Non-operative modalities include activity modification, NSAIDs, splints/braces and corticosteroid injections. Indications for surgical treatment were explained, and I explained the surgical procedures, the expected post-op courses, surgical risks and alternatives.     At this time Libertadtanmay Becerra is interested in continuing with a removable thumb spica velcro brace as it has given her nice relief thus far. They will wear this at all times for the next 2 weeks, after which they will use either this brace or a push MetaGrip CMC splint as needed. Should symptoms persist we may discuss one of the aforementioned treatment options in the future.     Follow Up: 6-8 weeks should symptoms not resolve fully    Uriah White MD   Hand, Wrist, & Elbow Surgery  [email or web address]       [1]   Allergies  Allergen Reactions    Latex HIVES    Morphine Sulfate In Dextrose RASH     States skin turned orange.    Latex ITCHING

## 2025-02-05 DIAGNOSIS — I10 BENIGN ESSENTIAL HTN: ICD-10-CM

## 2025-02-07 RX ORDER — LOSARTAN POTASSIUM 50 MG/1
50 TABLET ORAL DAILY
Qty: 90 TABLET | Refills: 1 | Status: SHIPPED | OUTPATIENT
Start: 2025-02-07

## 2025-02-07 NOTE — TELEPHONE ENCOUNTER
Refill passed [facility] protocol.     Please review pended order due to high allergy alert.     High Allergy/Contraindication: losartanReactions: RASH. No reaction type specified. User documented allergy severity: None specified.  Level 2 with MORPHINE SULFATE IN DEXTROSE (Class: CORN-RELATED PRODUCTS).  Requested Prescriptions     Pending Prescriptions Disp Refills    LOSARTAN 50 MG Oral Tab [Pharmacy Med Name: Losartan Potassium Oral Tablet 50 MG] 90 tablet 3     Sig: TAKE 1 TABLET EVERY DAY

## 2025-03-04 ENCOUNTER — LAB ENCOUNTER (OUTPATIENT)
Dept: LAB | Age: 75
End: 2025-03-04
Attending: INTERNAL MEDICINE
Payer: MEDICARE

## 2025-03-04 DIAGNOSIS — Z00.00 ROUTINE GENERAL MEDICAL EXAMINATION AT A HEALTH CARE FACILITY: ICD-10-CM

## 2025-03-04 DIAGNOSIS — I10 BENIGN ESSENTIAL HTN: ICD-10-CM

## 2025-03-04 DIAGNOSIS — E78.49 OTHER HYPERLIPIDEMIA: ICD-10-CM

## 2025-03-04 LAB
ALBUMIN SERPL-MCNC: 4.6 G/DL (ref 3.2–4.8)
ALBUMIN/GLOB SERPL: 1.6 {RATIO} (ref 1–2)
ALP LIVER SERPL-CCNC: 89 U/L
ALT SERPL-CCNC: 13 U/L
ANION GAP SERPL CALC-SCNC: 12 MMOL/L (ref 0–18)
AST SERPL-CCNC: 24 U/L (ref ?–34)
BASOPHILS # BLD AUTO: 0.07 X10(3) UL (ref 0–0.2)
BASOPHILS NFR BLD AUTO: 0.8 %
BILIRUB SERPL-MCNC: 0.6 MG/DL (ref 0.2–1.1)
BUN BLD-MCNC: 8 MG/DL (ref 9–23)
CALCIUM BLD-MCNC: 9.3 MG/DL (ref 8.7–10.6)
CHLORIDE SERPL-SCNC: 99 MMOL/L (ref 98–112)
CHOLEST SERPL-MCNC: 238 MG/DL (ref ?–200)
CO2 SERPL-SCNC: 25 MMOL/L (ref 21–32)
CREAT BLD-MCNC: 0.77 MG/DL
EGFRCR SERPLBLD CKD-EPI 2021: 80 ML/MIN/1.73M2 (ref 60–?)
EOSINOPHIL # BLD AUTO: 0.23 X10(3) UL (ref 0–0.7)
EOSINOPHIL NFR BLD AUTO: 2.7 %
ERYTHROCYTE [DISTWIDTH] IN BLOOD BY AUTOMATED COUNT: 13.5 %
FASTING PATIENT LIPID ANSWER: YES
FASTING STATUS PATIENT QL REPORTED: YES
GLOBULIN PLAS-MCNC: 2.9 G/DL (ref 2–3.5)
GLUCOSE BLD-MCNC: 91 MG/DL (ref 70–99)
HCT VFR BLD AUTO: 42.5 %
HDLC SERPL-MCNC: 66 MG/DL (ref 40–59)
HGB BLD-MCNC: 14.4 G/DL
IMM GRANULOCYTES # BLD AUTO: 0.02 X10(3) UL (ref 0–1)
IMM GRANULOCYTES NFR BLD: 0.2 %
LDLC SERPL CALC-MCNC: 149 MG/DL (ref ?–100)
LYMPHOCYTES # BLD AUTO: 1.92 X10(3) UL (ref 1–4)
LYMPHOCYTES NFR BLD AUTO: 22.7 %
MCH RBC QN AUTO: 31.7 PG (ref 26–34)
MCHC RBC AUTO-ENTMCNC: 33.9 G/DL (ref 31–37)
MCV RBC AUTO: 93.6 FL
MONOCYTES # BLD AUTO: 0.71 X10(3) UL (ref 0.1–1)
MONOCYTES NFR BLD AUTO: 8.4 %
NEUTROPHILS # BLD AUTO: 5.49 X10 (3) UL (ref 1.5–7.7)
NEUTROPHILS # BLD AUTO: 5.49 X10(3) UL (ref 1.5–7.7)
NEUTROPHILS NFR BLD AUTO: 65.2 %
NONHDLC SERPL-MCNC: 172 MG/DL (ref ?–130)
OSMOLALITY SERPL CALC.SUM OF ELEC: 280 MOSM/KG (ref 275–295)
PLATELET # BLD AUTO: 286 10(3)UL (ref 150–450)
POTASSIUM SERPL-SCNC: 4.2 MMOL/L (ref 3.5–5.1)
PROT SERPL-MCNC: 7.5 G/DL (ref 5.7–8.2)
RBC # BLD AUTO: 4.54 X10(6)UL
SODIUM SERPL-SCNC: 136 MMOL/L (ref 136–145)
TRIGL SERPL-MCNC: 129 MG/DL (ref 30–149)
VLDLC SERPL CALC-MCNC: 24 MG/DL (ref 0–30)
WBC # BLD AUTO: 8.4 X10(3) UL (ref 4–11)

## 2025-03-04 PROCEDURE — 80061 LIPID PANEL: CPT

## 2025-03-04 PROCEDURE — 85025 COMPLETE CBC W/AUTO DIFF WBC: CPT

## 2025-03-04 PROCEDURE — 36415 COLL VENOUS BLD VENIPUNCTURE: CPT

## 2025-03-04 PROCEDURE — 80053 COMPREHEN METABOLIC PANEL: CPT

## 2025-03-12 ENCOUNTER — OFFICE VISIT (OUTPATIENT)
Dept: INTERNAL MEDICINE CLINIC | Facility: CLINIC | Age: 75
End: 2025-03-12
Payer: MEDICARE

## 2025-03-12 ENCOUNTER — TELEPHONE (OUTPATIENT)
Dept: INTERNAL MEDICINE CLINIC | Facility: CLINIC | Age: 75
End: 2025-03-12

## 2025-03-12 VITALS
RESPIRATION RATE: 18 BRPM | HEIGHT: 62 IN | SYSTOLIC BLOOD PRESSURE: 124 MMHG | OXYGEN SATURATION: 98 % | WEIGHT: 160 LBS | BODY MASS INDEX: 29.44 KG/M2 | DIASTOLIC BLOOD PRESSURE: 74 MMHG | TEMPERATURE: 98 F | HEART RATE: 97 BPM

## 2025-03-12 DIAGNOSIS — Z00.00 ENCOUNTER FOR MEDICARE ANNUAL WELLNESS EXAM: Primary | ICD-10-CM

## 2025-03-12 DIAGNOSIS — E78.49 OTHER HYPERLIPIDEMIA: ICD-10-CM

## 2025-03-12 DIAGNOSIS — Z85.41 HISTORY OF CERVICAL CANCER: ICD-10-CM

## 2025-03-12 DIAGNOSIS — M47.816 ARTHRITIS, LUMBAR SPINE: ICD-10-CM

## 2025-03-12 DIAGNOSIS — M19.031 ARTHRITIS OF RIGHT WRIST: ICD-10-CM

## 2025-03-12 DIAGNOSIS — I10 BENIGN ESSENTIAL HTN: ICD-10-CM

## 2025-03-12 DIAGNOSIS — Z12.31 ENCOUNTER FOR SCREENING MAMMOGRAM FOR MALIGNANT NEOPLASM OF BREAST: ICD-10-CM

## 2025-03-12 DIAGNOSIS — Z86.0101 HISTORY OF ADENOMATOUS POLYP OF COLON: ICD-10-CM

## 2025-03-12 PROBLEM — R13.10 DYSPHAGIA, UNSPECIFIED: Status: RESOLVED | Noted: 2023-10-25 | Resolved: 2025-03-12

## 2025-03-12 PROBLEM — E87.0 HYPERNATREMIA: Status: RESOLVED | Noted: 2024-05-12 | Resolved: 2025-03-12

## 2025-03-12 PROBLEM — K21.9 GASTROESOPHAGEAL REFLUX DISEASE: Status: RESOLVED | Noted: 2021-09-17 | Resolved: 2025-03-12

## 2025-03-12 PROBLEM — R73.9 HYPERGLYCEMIA: Status: RESOLVED | Noted: 2024-05-12 | Resolved: 2025-03-12

## 2025-03-12 PROBLEM — T68.XXXA HYPOTHERMIA: Status: RESOLVED | Noted: 2024-05-12 | Resolved: 2025-03-12

## 2025-03-12 PROBLEM — E87.20 LACTIC ACID ACIDOSIS: Status: RESOLVED | Noted: 2024-05-12 | Resolved: 2025-03-12

## 2025-03-12 PROBLEM — E87.6 HYPOKALEMIA: Status: RESOLVED | Noted: 2024-05-12 | Resolved: 2025-03-12

## 2025-03-12 NOTE — PROGRESS NOTES
Subjective:   Libertad Becerra is a 74 year old female who presents for a MA AHA (Medicare Advantage Annual Health Assessment) and Medicare Subsequent Annual Wellness visit (Pt already had Initial Annual Wellness) and scheduled follow up of multiple significant but stable problems.    1. Encounter for Medicare annual wellness exam (Primary)- referred for mammogram, she is up to date on dexa and colonoscopy.  She declined PCV 20 today, may consider in the future. Declined covid 19 vaccine  2. History of cervical cancer- remote h/o cancer 1977, s/p hysterectomy  3. Benign essential HTN- controlled on losartan, no HA/DZ/CP  4. Other hyperlipidemia- LDL improving, HDL high at 66.  No cardiac complaints  5. Encounter for screening mammogram for malignant neoplasm of breast  -     [facility] TAMIKA 2D+3D SCREENING BILAT (CPT=77067/64284); Future; Expected date: 03/12/2025  6. History of adenomatous polyp of colon- she is up to date on colonoscopy  7. Arthritis, lumbar spine- she gets pain with prolonged standing or walking, typically does not take any medications for it  8. Arthritis of right wrist- saw ortho and activity modification advised, naproxen prn      History/Other:   Fall Risk Assessment:   She has been screened for Falls and is low risk.      Cognitive Assessment:   She had a completely normal cognitive assessment - see flowsheet entries     Functional Ability/Status:   Libertad Becerra has a completely normal functional assessment. See flowsheet for details.      Depression Screening (PHQ):  PHQ-2 SCORE: 0  , done 3/11/2025            Advanced Directives:   She does NOT have a Living Will. [Do you have a living will?: (Patient-Rptd) No]  She does NOT have a Power of  for Health Care. [Do you have a healthcare power of ?: (Patient-Rptd) No]  Not discussed      Patient Active Problem List   Diagnosis    Other hyperlipidemia    Environmental allergies    History of cervical cancer    Benign  essential HTN    DDD (degenerative disc disease), lumbar    Cervical radiculopathy    Dense breast tissue on mammogram    History of adenomatous polyp of colon    Family history of colon cancer    Hyperlipidemia    Primary hypertension    Hiatal hernia    Sepsis due to undetermined organism without resultant organ failure (HCC)    Abdominal pain of unknown etiology    Intra-abdominal abscess (HCC)    Arthritis, lumbar spine    Arthritis of right wrist     Allergies:  She is allergic to latex, morphine sulfate in dextrose, and latex.    Current Medications:  Outpatient Medications Marked as Taking for the 3/12/25 encounter (Office Visit) with Tash Egan MD   Medication Sig    losartan 50 MG Oral Tab TAKE 1 TABLET EVERY DAY    simethicone 80 MG Oral Chew Tab Chew 1 tablet (80 mg total) by mouth in the morning and 1 tablet (80 mg total) before bedtime.    Cholecalciferol (VITAMIN D) 1000 units Oral Tab Take 2,000 Units by mouth daily.       Medical History:  She  has a past medical history of Abdominal hernia, Abdominal pain, Allergic rhinitis, Arthritis, Back pain, Back problem, Belching, Body piercing, CANCER, cataract s/p surgery, Chest pain, Esophageal reflux, Essential hypertension, Fatigue, Flatulence/gas pain/belching, Frequent urination, Heartburn, Hemorrhoids, Hiatal hernia, High blood pressure, High cholesterol, History of blood transfusion, Indigestion, Leaking of urine, Night sweats, Pain in joints, Problems with swallowing, Shortness of breath, Sleep disturbance, Uncomfortable fullness after meals, Wears glasses, and Weight loss.  Surgical History:  She  has a past surgical history that includes hysterectomy; colonoscopy (2014); colonoscopy; egd; sigmoidoscopy,diagnostic; cataract; other surgical history (2024); d & c; and .   Family History:  Her family history includes Breast Cancer (age of onset: 70) in her mother; Cancer in her father, mother, and paternal grandmother; Colon Cancer  in her father; heart  problem in her maternal grandmother.  Social History:  She  reports that she has never smoked. She has never used smokeless tobacco. She reports that she does not drink alcohol and does not use drugs.    Tobacco:  She has never smoked tobacco.    CAGE Alcohol Screen:   CAGE screening score of 0 on 3/11/2025, showing low risk of alcohol abuse.      Patient Care Team:  Tash Egan MD as PCP - General  tuAnselmo MD (GASTROENTEROLOGY)    Review of Systems     Negative except occasional back pain if over does it with cooking, etc      Objective:   Physical Exam  GENERAL: well developed, NAD  HEENT: PERRRL, EOMI, OP-clear  Neck: supple, no TM  LUNGS: normal rate without respiratory distress, lungs clear to auscultation  CARDIO: RRR nl S1 S2  GI: normal bowel sounds, soft, NT/ND  EXTREMITIES: no c/c/e  Spine: no TTP, ROM WNL  NEURO: Alert and oriented, no focal deficits  PSYCH: normal mood    /74   Pulse 97   Temp 97.7 °F (36.5 °C)   Resp 18   Ht 5' 2\" (1.575 m)   Wt 160 lb (72.6 kg)   SpO2 98%   BMI 29.26 kg/m²  Estimated body mass index is 29.26 kg/m² as calculated from the following:    Height as of this encounter: 5' 2\" (1.575 m).    Weight as of this encounter: 160 lb (72.6 kg).    Medicare Hearing Assessment:   Finger rub wnl    Visual Acuity:   Right Eye Visual Acuity: Corrected Right Eye Chart Acuity: 20/20   Left Eye Visual Acuity: Corrected Left Eye Chart Acuity: 20/25   Both Eyes Visual Acuity: Corrected Both Eyes Chart Acuity: 20/25   Able To Tolerate Visual Acuity: Yes        Assessment & Plan:   Libertad Becerra is a 74 year old female who presents for a Medicare Assessment.     1. Encounter for Medicare annual wellness exam (Primary)- referred for mammogram, she is up to date on dexa and colonoscopy.  She declined PCV 20 today, may consider in the future. Declined covid 19 vaccine  2. History of cervical cancer- remote h/o cancer 1977, s/p hysterectomy  3.  Benign essential HTN- controlled on losartan, CPM  4. Other hyperlipidemia- LDL improving, HDL high at 66.  Heart healthy diet, monitor yearly  5. Encounter for screening mammogram for malignant neoplasm of breast  -     [facility] TAMIKA 2D+3D SCREENING BILAT (CPT=77067/48505); Future; Expected date: 03/12/2025  6. History of adenomatous polyp of colon- she is up to date on colonoscopy  7. Arthritis, lumbar spine- she manages without medication, ok to use naproxen prn  8. Arthritis of right wrist- saw ortho and activity modification advised, naproxen prn     The patient indicates understanding of these issues and agrees to the plan.  Continue with current treatment plan.  Reinforced healthy diet, lifestyle, and exercise.      Return in about 1 year (around 3/12/2026), or if symptoms worsen or fail to improve, for annual.     Tash Egan MD, 3/12/2025     Supplementary Documentation:   General Health:  In the past six months, have you lost more than 10 pounds without trying?: (Patient-Rptd) 2 - No  Has your appetite been poor?: (Patient-Rptd) No  Type of Diet: (Patient-Rptd) Balanced  How does the patient maintain a good energy level?: (Patient-Rptd) Daily Walks  How would you describe your daily physical activity?: (Patient-Rptd) Light  How would you describe your current health state?: (Patient-Rptd) Good  How do you maintain positive mental well-being?: (Patient-Rptd) Social Interaction, Visiting Friends, Visiting Family  On a scale of 0 to 10, with 0 being no pain and 10 being severe pain, what is your pain level?: (Patient-Rptd) 4 - (Moderate)  In the past six months, have you experienced urine leakage?: (Patient-Rptd) 1-Yes  At any time do you feel concerned for the safety/well-being of yourself and/or your children, in your home or elsewhere?: (Patient-Rptd) No  Have you had any immunizations at another office such as Influenza, Hepatitis B, Tetanus, or Pneumococcal?: (Patient-Rptd) No    Health Maintenance   Topic  Date Due    Pneumococcal Vaccine: 50+ Years (1 of 1 - PCV) Never done    COVID-19 Vaccine (1 - 2024-25 season) Never done    Annual Well Visit  01/01/2025    Annual Depression Screening  01/01/2025    Influenza Vaccine (1) 06/30/2025 (Originally 10/1/2024)    Colorectal Cancer Screening  10/25/2028    DEXA Scan  Completed    Fall Risk Screening (Annual)  Completed    Zoster Vaccines  Completed    Meningococcal B Vaccine  Aged Out    Mammogram  Discontinued

## 2025-03-12 NOTE — TELEPHONE ENCOUNTER
Patient stated she was seen today and she has her after visit summary that shows she takes Simethicone. Patient is calling to see if we could remove this from her file. No call back required.

## 2025-03-20 ENCOUNTER — HOSPITAL ENCOUNTER (OUTPATIENT)
Dept: MAMMOGRAPHY | Age: 75
Discharge: HOME | End: 2025-03-20
Attending: INTERNAL MEDICINE
Payer: MEDICARE

## 2025-03-20 DIAGNOSIS — Z12.31 ENCOUNTER FOR SCREENING MAMMOGRAM FOR MALIGNANT NEOPLASM OF BREAST: ICD-10-CM

## 2025-03-20 PROCEDURE — 77067 SCR MAMMO BI INCL CAD: CPT | Performed by: INTERNAL MEDICINE

## 2025-03-20 PROCEDURE — 77063 BREAST TOMOSYNTHESIS BI: CPT | Performed by: INTERNAL MEDICINE

## 2025-05-05 ENCOUNTER — TELEPHONE (OUTPATIENT)
Dept: INTERNAL MEDICINE CLINIC | Facility: CLINIC | Age: 75
End: 2025-05-05

## 2025-05-05 NOTE — TELEPHONE ENCOUNTER
Patient called stating she was able to get the following medication from her insurance company sent to her  Levocetirizine 5mg     Instructions say to ask her PCP if this is ok to take   Adults 65 year of age or older ask a doctor  Compared to the active ingredients xyzal      Please advise if patient is ok to take this medication

## 2025-06-23 ENCOUNTER — NURSE TRIAGE (OUTPATIENT)
Dept: INTERNAL MEDICINE CLINIC | Facility: CLINIC | Age: 75
End: 2025-06-23

## 2025-06-23 NOTE — TELEPHONE ENCOUNTER
Action Requested: Summary for Provider     []  Critical Lab, Recommendations Needed  [] Need Additional Advice  []   FYI    []   Need Orders  [] Need Medications Sent to Pharmacy  []  Other     SUMMARY: Patient called with concerns her knees are buckling intermittently and she has occasional aching of knees. Denies any injury or falls.  Patient able to do normal activities. Appointment scheduled with Debbie tomorrow for further recommendations. Home care instructions and urgent care warnings provided patient verbalized understanding.   Reason for call: Knee Pain  Onset: weeks                     Reason for Disposition   MILD knee pain persists > 7 days    Protocols used: Knee Pain-A-[place]

## 2025-06-24 ENCOUNTER — OFFICE VISIT (OUTPATIENT)
Dept: INTERNAL MEDICINE CLINIC | Facility: CLINIC | Age: 75
End: 2025-06-24
Payer: MEDICARE

## 2025-06-24 VITALS
SYSTOLIC BLOOD PRESSURE: 122 MMHG | BODY MASS INDEX: 30 KG/M2 | HEART RATE: 89 BPM | WEIGHT: 163 LBS | DIASTOLIC BLOOD PRESSURE: 74 MMHG | OXYGEN SATURATION: 95 % | TEMPERATURE: 97 F | RESPIRATION RATE: 18 BRPM | HEIGHT: 62 IN

## 2025-06-24 DIAGNOSIS — M25.369 KNEE INSTABILITY, UNSPECIFIED LATERALITY: ICD-10-CM

## 2025-06-24 DIAGNOSIS — M25.561 CHRONIC PAIN OF BOTH KNEES: Primary | ICD-10-CM

## 2025-06-24 DIAGNOSIS — M25.562 CHRONIC PAIN OF BOTH KNEES: Primary | ICD-10-CM

## 2025-06-24 DIAGNOSIS — G89.29 CHRONIC PAIN OF BOTH KNEES: Primary | ICD-10-CM

## 2025-06-24 PROCEDURE — 1159F MED LIST DOCD IN RCRD: CPT

## 2025-06-24 PROCEDURE — 3078F DIAST BP <80 MM HG: CPT

## 2025-06-24 PROCEDURE — 3074F SYST BP LT 130 MM HG: CPT

## 2025-06-24 PROCEDURE — 3008F BODY MASS INDEX DOCD: CPT

## 2025-06-24 PROCEDURE — G2211 COMPLEX E/M VISIT ADD ON: HCPCS

## 2025-06-24 PROCEDURE — 1160F RVW MEDS BY RX/DR IN RCRD: CPT

## 2025-06-24 PROCEDURE — 99214 OFFICE O/P EST MOD 30 MIN: CPT

## 2025-06-24 NOTE — PROGRESS NOTES
Libertad Becerra is a 75 year old female.   Chief Complaint   Patient presents with    Knee Pain     KK Rm 12 - Knee buckling/balance issues x2 wks.     HPI:      History of Present Illness  Libertad Becerra is a 75 year old female who presents with knee buckling and instability of both knees.    They have been experiencing knee buckling for the past two weeks, which is concerning due to the potential for losing balance, although they have not fallen. The buckling occurs infrequently but is more pronounced when descending stairs. Both knees are affected, but the buckling does not occur simultaneously and varies in occurrence.    They enjoy riding their bike but have refrained from standing while riding due to fear of falling if their knees buckle. The last imaging of their knees was conducted in August 2018, which showed mild arthritis in the right knee's medial and peripatellar compartments.    They are currently taking vitamin D supplements, with a recent increase to 2000 IUs daily, but are unsure if their vitamin D contains calcium. They are aware of the importance of dietary calcium intake through dairy products. No falls despite the knee buckling, and the buckling tends to occur when going downstairs, described as occurring inwardly.       Allergies:  Allergies[1]   Current Meds:  Current Medications[2]     PMH:   Past Medical History[3]    ROS:   Review of Systems   Constitutional: Negative.    Musculoskeletal:  Positive for gait problem (bilatera knee pain and weakness/instability). Negative for back pain and joint swelling.        PHYSICAL EXAM:    /74 (BP Location: Left arm, Patient Position: Sitting, Cuff Size: adult)   Pulse 89   Temp 97.2 °F (36.2 °C) (Temporal)   Resp 18   Ht 5' 2\" (1.575 m)   Wt 163 lb (73.9 kg)   SpO2 95%   BMI 29.81 kg/m²   Physical Exam  Constitutional:       Appearance: Normal appearance. Libertad is not ill-appearing or toxic-appearing.   Musculoskeletal:       Right knee: Normal range of motion. No tenderness. MCL laxity present.      Left knee: Normal range of motion. No tenderness. MCL laxity present.   Skin:     Capillary Refill: Capillary refill takes less than 2 seconds.   Neurological:      Mental Status: Libertad is alert and oriented to person, place, and time.          ASSESSMENT/ PLAN:     Assessment & Plan  Knee Buckling and Pain  Risk of falls noted. Conservative management preferred, but surgical options considered if necessary.  - Order bilateral knee X-rays for current status and comparison with previous imaging.  - Recommend physical therapy to strengthen knees and improve stability.  - Advise use of knee braces with side stabilizers for support during activities.  - Refer to orthopedic specialist, Dr. Porras, for further evaluation and management.      General Health Maintenance  - Continue vitamin D supplementation (2000 IU daily).  - Ensure adequate dietary calcium intake.    Follow-up  Plan for imaging and physical therapy initiation. Orthopedic follow-up to assess further interventions.  - Schedule and complete bilateral knee X-rays.  - Begin physical therapy sessions.  - Contact Dr. Porras's office to schedule an orthopedic consultation.  - Follow up with results of imaging and adjust management plan as needed.       Health Maintenance Due   Topic Date Due    Pneumococcal Vaccine: 50+ Years (1 of 1 - PCV) Never done    COVID-19 Vaccine (1 - 2024-25 season) Never done            The following individual(s) verbally consented to be recorded using ambient AI listening technology and understand that they can each withdraw their consent to this listening technology at any point by asking the clinician to turn off or pause the recording:    Patient name: Libertad Becerra    Pt indicates understanding and agrees to the plan.     No follow-ups on file.    CHRISTIANO Flores          [1]   Allergies  Allergen Reactions    Latex HIVES    Morphine Sulfate In  Dextrose RASH     States skin turned orange.    Latex ITCHING   [2]   Current Outpatient Medications   Medication Sig Dispense Refill    losartan 50 MG Oral Tab TAKE 1 TABLET EVERY DAY 90 tablet 1    naproxen 500 MG Oral Tab Take 1 tablet (500 mg total) by mouth 2 (two) times daily with meals. 30 tablet 0    Cholecalciferol (VITAMIN D) 1000 units Oral Tab Take 2,000 Units by mouth daily.     [3]   Past Medical History:   Abdominal hernia    Abdominal pain    Allergic rhinitis    Arthritis    Back pain    Back problem    Belching    Body piercing    CANCER    cervical cancer-partial hysterectomy    cataract s/p surgery    Chest pain    Esophageal reflux    Essential hypertension    Fatigue    Flatulence/gas pain/belching    Frequent urination    Heartburn    Hemorrhoids    Hiatal hernia    High blood pressure    High cholesterol    History of blood transfusion    FEW YEARS AGO    Indigestion    Leaking of urine    Night sweats    Pain in joints    Problems with swallowing    Shortness of breath    Sleep disturbance    Uncomfortable fullness after meals    Wears glasses    Weight loss

## 2025-06-25 ENCOUNTER — HOSPITAL ENCOUNTER (OUTPATIENT)
Dept: GENERAL RADIOLOGY | Age: 75
Discharge: HOME | End: 2025-06-25
Payer: MEDICARE

## 2025-06-25 DIAGNOSIS — M25.562 CHRONIC PAIN OF BOTH KNEES: ICD-10-CM

## 2025-06-25 DIAGNOSIS — M25.369 KNEE INSTABILITY, UNSPECIFIED LATERALITY: ICD-10-CM

## 2025-06-25 DIAGNOSIS — G89.29 CHRONIC PAIN OF BOTH KNEES: ICD-10-CM

## 2025-06-25 DIAGNOSIS — M25.561 CHRONIC PAIN OF BOTH KNEES: ICD-10-CM

## 2025-06-25 PROCEDURE — 73564 X-RAY EXAM KNEE 4 OR MORE: CPT

## 2025-07-05 DIAGNOSIS — I10 BENIGN ESSENTIAL HTN: ICD-10-CM

## 2025-07-07 ENCOUNTER — OFFICE VISIT (OUTPATIENT)
Dept: ORTHOPEDICS CLINIC | Facility: CLINIC | Age: 75
End: 2025-07-07
Payer: MEDICARE

## 2025-07-07 VITALS — HEIGHT: 62 IN | BODY MASS INDEX: 30 KG/M2 | WEIGHT: 163 LBS

## 2025-07-07 DIAGNOSIS — M17.12 PRIMARY OSTEOARTHRITIS OF LEFT KNEE: Primary | ICD-10-CM

## 2025-07-07 DIAGNOSIS — M17.11 PRIMARY OSTEOARTHRITIS OF RIGHT KNEE: ICD-10-CM

## 2025-07-07 PROCEDURE — 3008F BODY MASS INDEX DOCD: CPT | Performed by: STUDENT IN AN ORGANIZED HEALTH CARE EDUCATION/TRAINING PROGRAM

## 2025-07-07 PROCEDURE — 1159F MED LIST DOCD IN RCRD: CPT | Performed by: STUDENT IN AN ORGANIZED HEALTH CARE EDUCATION/TRAINING PROGRAM

## 2025-07-07 PROCEDURE — 99213 OFFICE O/P EST LOW 20 MIN: CPT | Performed by: STUDENT IN AN ORGANIZED HEALTH CARE EDUCATION/TRAINING PROGRAM

## 2025-07-07 PROCEDURE — 1160F RVW MEDS BY RX/DR IN RCRD: CPT | Performed by: STUDENT IN AN ORGANIZED HEALTH CARE EDUCATION/TRAINING PROGRAM

## 2025-07-07 NOTE — PROGRESS NOTES
Orthopaedic Surgery  [address] [address] [address]   [phone]      Chief Complaint:  Bilateral Knee Pain    History of Present Illness  Libertad Becerra is a 75 year old female who presents for evaluation of their knees.    They experience occasional knee pain, described as an ache that sometimes persists. The pain is primarily located on the inside of both knees. They have been using knee braces, which help alleviate discomfort, especially when walking, biking, or performing activities like washing clothes and cooking.    They experience knee buckling primarily in the right knee, occurring two to three times since June 24, 2025. The most significant incident happened while standing in their living room, causing them to tip but not fall. They want to ensure their knees are okay.    They have six appointments scheduled for physical therapy via their PCP. They belong to a gym and are considering working with a  to set up a resistance exercise routine.    In 2018, they received a knee injection in their right knee, which provided relief for approximately two years.    She does not require any assistive devices.      PMH/PSH/Family History/Social History/Meds/Allergies:   Past Medical History[1]     Past Surgical History[2]     Family History[3]     Social History     Socioeconomic History    Marital status:      Spouse name: Not on file    Number of children: Not on file    Years of education: Not on file    Highest education level: Not on file   Occupational History    Not on file   Tobacco Use    Smoking status: Never    Smokeless tobacco: Never   Vaping Use    Vaping status: Never Used   Substance and Sexual Activity    Alcohol use: Not Currently    Drug use: No    Sexual activity: Never   Other Topics Concern     Service Not Asked    Blood Transfusions Not Asked    Caffeine Concern No    Occupational Exposure Not Asked    Hobby Hazards Not Asked    Sleep Concern  Not Asked    Stress Concern Not Asked    Weight Concern Not Asked    Special Diet Not Asked    Back Care Not Asked    Exercise No     Comment: biking    Bike Helmet Not Asked    Seat Belt Yes    Self-Exams Not Asked   Social History Narrative    Not on file     Social Drivers of Health     Food Insecurity: No Food Insecurity (6/24/2025)    NCSS - Food Insecurity     Worried About Running Out of Food in the Last Year: No     Ran Out of Food in the Last Year: No   Transportation Needs: No Transportation Needs (6/24/2025)    NCSS - Transportation     Lack of Transportation: No   Stress: Not on file   Housing Stability: Not At Risk (6/24/2025)    NCSS - Housing/Utilities     Has Housing: Yes     Worried About Losing Housing: No     Unable to Get Utilities: No        Current Outpatient Medications   Medication Instructions    losartan (COZAAR) 50 mg, Oral, Daily    naproxen (NAPROSYN) 500 mg, Oral, 2 times daily with meals    Vitamin D 2,000 Units, Daily        Allergies[4]       Physical Exam:   Vitals:    07/07/25 0844   Weight: 163 lb (73.9 kg)   Height: 5' 2\" (1.575 m)     Estimated body mass index is 29.81 kg/m² as calculated from the following:    Height as of this encounter: 5' 2\" (1.575 m).    Weight as of this encounter: 163 lb (73.9 kg).    Constitutional: No acute distress, well nourished  Eyes: Anicteric sclera, pink conjunctiva  Ears, Nose, Mouth and Throat: Normocephalic, atraumatic, moist mucous membranes  Cardiovascular: No pitting edema or varicosities in the lower extremities  Respiratory: No respiratory distress, normal respiratory rhythm and effort   Neurological:  Oriented to person, place, and time  Psychological:  Appropriate mood and affect    Bilateral Knee Exam:      Inspection: No erythema, ecchymoses, or wounds. No rash. No previous incisions noted. No effusion. No quad atrophy  Alignment: mild varus, correctable  ROM: 0 - 130 degrees, flexion contracture: 0 degrees, quad lag: no  Stability:  A/P stress: stable, firm endpoint, M/L stress: stable, firm endpoint  Pain or crepitus with ROM?: No. No pain with patellar grind  Tenderness to palpation at: medial joint line on the left; Non-tender at: lateral joint line, patella  Strength: Intact 5/5 strength SLR and TA/GS/FHL/EHL  Sensation: Grossly intact to light touch over SPN/DPN/Saph/Sural/Tibial nerve distributions  Vasc: Warm perfused extremity        Imaging:   Weightbearing XRs of the bilateral knee were obtained with AP, PA Flex, sunrise, and lateral views    They show moderate degenerative changes of the knee involving the medial compartment(s) with joint space narrowing, osteophyte formation, and subchondral sclerosis. No fracture or dislocation seen    I personally reviewed and interpreted the radiographs.      Assessment:     ICD-10-CM    1. Primary osteoarthritis of left knee  M17.12       2. Primary osteoarthritis of right knee  M17.11              Plan:   At today's visit I discussed with the patient their diagnosis and the factors considered to form this diagnosis. Their history and physical exam and radiographic findings are consistent with arthritis of the knee. As we discussed their diagnosis, information was provided regarding the underlying pathology and the natural course of this progressive condition.    During our discussion, we detailed various treatment options available. We counseled the patient on treatment options. Major joint arthritis is usually a chronic condition, and it appears that she has been managing it effectively with conservative modalities. Her buckling may be from episodes of correctable varus collapse of the knee.    Conservative measures include activity modification, home exercise programs, physical therapy, oral anti-inflammatories and acetaminophen, braces, assistive devices, and intraarticular injections.     In consideration of prior treatments, I have recommended continued conservative treatment with  combination of rest, ice, or elevation, physical therapy, and bracing. I encouraged her to continue activities as tolerated, including biking and exercising in the pool.    Follow up if symptoms worsen and we can re-evaluate for a corticosteroid injection.      Thank you very much for allowing me to participate in the care of this patient. If you have any questions, please do not hesitate to contact me.      Magdy Porras MD  Adult Hip and Knee Reconstruction    Department of Orthopaedic Surgery  [facility]     [address] [place] [address]  [address] [place] [id number]     t: [phone]  f: [phone]       [facility]  The following individual(s) verbally consented to be recorded using ambient AI listening technology and understand that they can each withdraw their consent to this listening technology at any point by asking the clinician to turn off or pause the recording:    Patient name: Libertad VILLANUEVA Hernan Mckeon tool was used for dictation purposes only and the patient was not recorded at any point during the visit.         [1]   Past Medical History:   Abdominal hernia    Abdominal pain    Allergic rhinitis    Arthritis    Back pain    Back problem    Belching    Body piercing    CANCER    cervical cancer-partial hysterectomy    cataract s/p surgery    Chest pain    Esophageal reflux    Essential hypertension    Fatigue    Flatulence/gas pain/belching    Frequent urination    Heartburn    Hemorrhoids    Hiatal hernia    High blood pressure    High cholesterol    History of blood transfusion    FEW YEARS AGO    Indigestion    Leaking of urine    Night sweats    Pain in joints    Problems with swallowing    Shortness of breath    Sleep disturbance    Uncomfortable fullness after meals    Wears glasses    Weight loss   [2]   Past Surgical History:  Procedure Laterality Date    Cataract      Colonoscopy  11/13/2014    Colonoscopy      D & c      Egd      Hysterectomy       partial hysterec          Other surgical history  2024    robotic assisted hiatal hernia repair and fundoplication by Dr. Powell    Sigmoidoscopy,diagnostic     [3]   Family History  Problem Relation Age of Onset    Cancer Father     Colon Cancer Father     Cancer Mother     Breast Cancer Mother 70        early to mid 70's    Cancer Paternal Grandmother     Other (heart  problem) Maternal Grandmother    [4]   Allergies  Allergen Reactions    Latex HIVES    Morphine Sulfate In Dextrose RASH     States skin turned orange.    Latex ITCHING

## 2025-07-08 ENCOUNTER — TELEPHONE (OUTPATIENT)
Dept: PHYSICAL THERAPY | Facility: HOSPITAL | Age: 75
End: 2025-07-08

## 2025-07-08 RX ORDER — LOSARTAN POTASSIUM 50 MG/1
50 TABLET ORAL DAILY
Qty: 90 TABLET | Refills: 1 | Status: SHIPPED | OUTPATIENT
Start: 2025-07-08

## 2025-07-08 NOTE — TELEPHONE ENCOUNTER
[facility]LL [facility]ASSED [facility]     Please review pended refill request as unable to refill due to high/very high drug interaction warning copied here;            High  Allergy/Contraindication: losartanReactions: RASH. No reaction type specified. User documented allergy severity: None specified.  Level 2 with MORPHINE SULFATE IN DEXTROSE (Class: CORN-RELATED PRODUCTS).  \"States skin turned orange.\"  Details

## 2025-07-09 ENCOUNTER — OFFICE VISIT (OUTPATIENT)
Dept: PHYSICAL THERAPY | Facility: HOSPITAL | Age: 75
End: 2025-07-09
Payer: MEDICARE

## 2025-07-09 ENCOUNTER — TELEPHONE (OUTPATIENT)
Dept: PHYSICAL THERAPY | Facility: HOSPITAL | Age: 75
End: 2025-07-09

## 2025-07-09 DIAGNOSIS — M25.369 KNEE INSTABILITY, UNSPECIFIED LATERALITY: ICD-10-CM

## 2025-07-09 DIAGNOSIS — M25.562 CHRONIC PAIN OF BOTH KNEES: Primary | ICD-10-CM

## 2025-07-09 DIAGNOSIS — M25.561 CHRONIC PAIN OF BOTH KNEES: Primary | ICD-10-CM

## 2025-07-09 DIAGNOSIS — G89.29 CHRONIC PAIN OF BOTH KNEES: Primary | ICD-10-CM

## 2025-07-09 PROCEDURE — 97161 PT EVAL LOW COMPLEX 20 MIN: CPT

## 2025-07-09 PROCEDURE — 97110 THERAPEUTIC EXERCISES: CPT

## 2025-07-09 NOTE — PROGRESS NOTES
LOWER EXTREMITY EVALUATION:     Diagnosis:   chronic B knee pain Patient:  Libertad Becerra (75 year old, female)        Referring Provider: Debbie Camacho  Today's Date   7/9/2025    Precautions:      Date of Evaluation: 07/09/25  Next MD visit: TBD  Date of Surgery: No data recorded     PATIENT SUMMARY     Summary of chief complaints: B chronic knee pain for years. States recently she started to have her knees buckle on her when walking. Did not fall but felt them give out. R more than L with pain and buckling. Wears knee braces. Does walk daily and ride her bike for exericse.  History of current condition: Long h/o chronic B knee pain, constant achey with days of more medial knee pain B. States recently R > L knee pain. X-rays:Weightbearing XRs of the bilateral knee were obtained with AP, PA Flex, sunrise, and lateral views     They show moderate degenerative changes of the knee involving the medial compartment(s) with joint space narrowing, osteophyte formation, and subchondral sclerosis. No fracture or dislocation seen. In 2018 did have injections that did give her temporary relief. Has been wearing B knee braces when she is walking, biking or doing activities around the house.   Pain level: current 0 /10, at best 0 /10, at worst 6 /10  Description of symptoms: constant ache that increases with activity. Pain with prolonged walking, going from sit-stand, kneeling, squatting, and negotiating stairs.   Occupation: retired   Leisure activities/Hobbies: walking and biking   Prior level of function: independent  Current limitations: Pain with prolonged standing, walking, squatting, kneeling, going from sit-stand, negotiating stairs, and sleeping  Pt goals: decrease pain in B knees  Past medical history was reviewed with Libertad.  Significant findings include:    Imaging/Tests: Weightbearing XRs of the bilateral knee were obtained with AP, PA Flex, sunrise, and lateral views They show moderate degenerative  changes of the knee involving the medial compartment(s) with joint space narrowing, osteophyte formation, and subchondral sclerosis. No fracture or dislocation seen   Libertad  has a past medical history of Abdominal hernia, Abdominal pain, Allergic rhinitis, Arthritis, Back pain, Back problem, Belching, Body piercing, CANCER, cataract s/p surgery (2014), Chest pain, Esophageal reflux, Essential hypertension, Fatigue, Flatulence/gas pain/belching, Frequent urination, Heartburn, Hemorrhoids, Hiatal hernia, High blood pressure, High cholesterol, History of blood transfusion, Indigestion, Leaking of urine, Night sweats, Pain in joints, Problems with swallowing, Shortness of breath, Sleep disturbance, Uncomfortable fullness after meals, Wears glasses, and Weight loss.  Libertad  has a past surgical history that includes hysterectomy; colonoscopy (2014); colonoscopy; egd; sigmoidoscopy,diagnostic; cataract; other surgical history (2024); d & c; and .    ASSESSMENT  Libertad presents to physical therapy evaluation with primary c/o B chronic knee pain for years. States recently she started to have her knees buckle on her when walking. Did not fall but felt them give out. R more than L with pain and buckling. Wears knee braces. Does walk daily and ride her bike for exericse.. The results of the objective tests and measures show Painful B knee ROM, B LE weakness, impaired gait, impaired balance, impaired B LE flexibility, hypomobile patellar mobility. Functional deficits include but are not limited to Pain with prolonged standing, walking, squatting, kneeling, going from sit-stand, negotiating stairs, and sleeping. Signs and symptoms are consistent with diagnosis of chronic B knee pain. Pt and PT discussed evaluation findings, pathology, POC and HEP.  Pt voiced understanding and performs HEP correctly without reported pain. Skilled Physical Therapy is medically necessary to address the above impairments  and reach functional goals.    OBJECTIVE:      Musculoskeletal  Observation: knee varus, forward flexed, rounded shoulders  Palpation: tenderness R > L medial joint line   Accessory Motion: hypomobile B patella     Edema: mild pocket edema L knee this date   Special Tests:(-) B knee ligament testing     ROM and Strength: (* denotes performed with pain)  Core: upper and lower abs 3/5  Hip   ROM MMT (-/5)    R L R L     Flex (L2) 110 110 4 4     Ext  15 15 4- 4-     Abd 40 40 4 4     ER 30 30 4- 4-     IR 40 40 4+ 4+    ,   Knee   ROM MMT (-/5)    R L R L     Flex 135 130 4 4     Ext (L3) 0 0 4- 4-     ,   Ankle/Foot   ROM MMT (-/5)    R L R L     PF 35 35 5 5     DF (L4) 10 10 5 5     Inversion     5 5     Eversion     5 5     Grt Toe Ext (L5)     5 5       Flexibility:    LE Flexibility R L     Hip Flexor mod restricted mod restricted     Hamstrings min restricted (-20) min restricted (-20)     ITB significantly restricted significantly restricted     Piriformis significantly restricted significantly restricted     Quads mod restricted mod restricted     Gastroc-soleus mod restricted mod restricted     LE Flexibility Other R L              Neurological:  Sensation: intact to light touch     Balance and Functional Mobility:  Gait: pt ambulates on level ground with decreased step length; decreased stance phase; stooped posture/forward lean; path deviation with visual scanning   Tandem Stance: R back: 10 sec; L back: 10 sec  SLS: R 0 sec,  L 0 sec  Functional Mobility:  5x sit to stand test: 13 sec   TU.31 sec   Stairs:recip pattern with railing, but with pain in R >L ascending     Today's Treatment and Response:   Pt education was provided on exam findings, treatment diagnosis, treatment plan, expectations, and prognosis.    Today's Treatment       2025   LE Treatment   Modalities Ice x 5 min   Therapeutic Exercise Minutes 10   Evaluation Minutes 35   Hot/Cold Pack Minutes 5   Total Time Of Timed Procedures 10    Total Time Of Service-Based Procedures 40   Total Treatment Time 50   HEP Joint protection principles  POC education  Pain management  SKTC  HS stretch with flossing  ITB stretch with strap  ice        Patient was instructed in and issued a HEP for: Joint protection principles  POC education  Pain management  SKTC  HS stretch with flossing  ITB stretch with strap  ice    Charges:  PT EVAL: Low Complexity, Eval TE  In agreement with evaluation findings and clinical rationale, this evaluation involved LOW COMPLEXITY decision making due to no personal factors/comorbidities, 1-2 body structures involved/activity limitations, and stable symptoms as documented in the evaluation.                                                                                                                PLAN OF CARE:      Goals: (to be met in 8 visits)       Improve R/L LE strength by ½ grade to allow pt. to negotiate stairs with more ease  Improve core stability by ½ grade to promote improved posture and body mechanics.  Decrease symptoms by 50% to allow pt. to tolerate walking 2 miles with more ease.   Pt. to demonstrate improved posture and body mechanics with bending/lifting up to 20 pounds.  Improve B LE HS flexibility by 5 degrees to decrease stress on B knees and lumbar/pelvic region.  Pt. to be independent with HEP, joint protection principles, improved posture and body mechanics.       Frequency / Duration: Patient will be seen 1-2x/wkx/week or a total of 8  visits over a 90 day period. Treatment will include: Gait training; Manual Therapy; Neuromuscular Re-education; Therapeutic Activities; Therapeutic Exercise; Home Exercise Program instruction; Electrical stimulation (unattended); Patient/Family Education; Other (use comment) (heat, ice, taping)    Education or treatment limitation: None   Rehab Potential:       LEFS Score  LEFS Score: (Patient-Rptd) 81.25 % (7/9/2025 12:40 PM)      Patient/Family/Caregiver was advised of  these findings, precautions, and treatment options and has agreed to actively participate in planning and for this course of care.    Thank you for your referral. Please co-sign or sign and return this letter via fax as soon as possible to [phone]. If you have any questions, please contact me at Dept: [phone]    Sincerely,  Electronically signed by therapist: Lisandra Stanford PT  Physician's certification required: Yes  I certify the need for these services furnished under this plan of treatment and while under my care.    X___________________________________________________ Date____________________    Certification From: 7/9/2025  To: 10/7/2025

## 2025-07-14 ENCOUNTER — OFFICE VISIT (OUTPATIENT)
Dept: PHYSICAL THERAPY | Facility: HOSPITAL | Age: 75
End: 2025-07-14
Payer: MEDICARE

## 2025-07-14 PROCEDURE — 97110 THERAPEUTIC EXERCISES: CPT | Performed by: PHYSICAL THERAPY ASSISTANT

## 2025-07-14 NOTE — PROGRESS NOTES
Patient: Libertad Becerra (75 year old, female) Referring Provider:  Insurance:   Diagnosis: chronic B knee pain Debbieshanel CANTU KERA   Date of Surgery: No data recorded Next MD visit:  N/A   Precautions:    TBD Referral Information:    Date of Evaluation: Req: 8, Auth: 8, Exp: 9/9/2025 07/09/25 POC Auth Visits:          Today's Date   7/14/2025    Subjective  No increase in pain after eval. Have been doing HEP x 2 /day. NOt taking any pain beds.       Pain: 5/10     Objective          ROM and Strength: (* denotes performed with pain)  Core: upper and lower abs 3/5         Hip    ROM MMT (-/5)    R L R L     Flex (L2) 110 110 4 4     Ext  15 15 4- 4-     Abd 40 40 4 4     ER 30 30 4- 4-     IR 40 40 4+ 4+               ,        Assessment  Discussed use of braces. Pt educated in purpose of bracing and PT exercises aiming to increas strength in musculature around knees to perform job of braces. Reviewed MMT -  Pt educated in glute and hamstring activation. Pt states some deficits with balance which she tries ot address at home with SLS practice. PT remains necessary to address ongoing deficits in strength.    Goals (to be met in 8 visits)   Goals: (to be met in 8 visits)         Improve R/L LE strength by ½ grade to allow pt. to negotiate stairs with more ease  Improve core stability by ½ grade to promote improved posture and body mechanics.  Decrease symptoms by 50% to allow pt. to tolerate walking 2 miles with more ease.   Pt. to demonstrate improved posture and body mechanics with bending/lifting up to 20 pounds.  Improve B LE HS flexibility by 5 degrees to decrease stress on B knees and lumbar/pelvic region.  Pt. to be independent with HEP, joint protection principles, improved posture and body mechanics.     Plan  Pt will incorporate tandem stance in to HEP. Continue to reduce brace use.    Treatment Last 4 Visits  Treatment Day: 2       7/9/2025 7/14/2025   LE Treatment   Therapeutic Exercise  SKTC x  10 B   ITB stretch 3 x 30 secs  Bridges 2 x 10   SL clams 2 x 10   SL abduction x 10 B   Shuttle 62# x 20 reps   Standing hip extension x 20 B   Tandem stance 2 x  30 secs B - minimal UE support at raised plinth   Step  ups on to Airex pad x 10 B      Modalities Ice x 5 min    Therapeutic Exercise Minutes 10    Evaluation Minutes 35    Hot/Cold Pack Minutes 5    Total Time Of Timed Procedures 10 0   Total Time Of Service-Based Procedures 40 0   Total Treatment Time 50 0   HEP Joint protection principles  POC education  Pain management  SKTC  HS stretch with flossing  ITB stretch with strap  ice SKTC  HS stretch with flossing  ITB stretch with strap          HEP  SKTC  HS stretch with flossing  ITB stretch with strap      Charges     3 x ther ex

## 2025-07-21 ENCOUNTER — OFFICE VISIT (OUTPATIENT)
Dept: PHYSICAL THERAPY | Facility: HOSPITAL | Age: 75
End: 2025-07-21
Payer: MEDICARE

## 2025-07-21 ENCOUNTER — TELEPHONE (OUTPATIENT)
Dept: PHYSICAL THERAPY | Facility: HOSPITAL | Age: 75
End: 2025-07-21

## 2025-07-21 PROCEDURE — 97110 THERAPEUTIC EXERCISES: CPT

## 2025-07-21 PROCEDURE — 97140 MANUAL THERAPY 1/> REGIONS: CPT

## 2025-07-23 NOTE — PROGRESS NOTES
Patient: Libertad Becerra (75 year old, female) Referring Provider:  Insurance:   Diagnosis: chronic B knee pain Debbie CANTU KERA   Date of Surgery: No data recorded Next MD visit:  N/A   Precautions:    TBD Referral Information:    Date of Evaluation: Req: 8, Auth: 8, Exp: 9/9/2025 07/09/25 POC Auth Visits:  8       Today's Date   7/21/2025    Subjective  Pt. states she is doing well. HEP going well.       Pain: 3/10     Objective  Refer to flow sheet. Emphasis on B LE strengthening, improving B LE flexibility, and improving patellar mobility. Progressed with sand dune this date.              Assessment  Tolerated session well.No increase in pain with new exercises. Good response to stretching and MT. Tightness along ITB and HS that responded well to MT and stretching.    Goals (to be met in 8 visits)   Improve R/L LE strength by ½ grade to allow pt. to negotiate stairs with more ease  Improve core stability by ½ grade to promote improved posture and body mechanics.  Decrease symptoms by 50% to allow pt. to tolerate walking 2 miles with more ease.   Pt. to demonstrate improved posture and body mechanics with bending/lifting up to 20 pounds.  Improve B LE HS flexibility by 5 degrees to decrease stress on B knees and lumbar/pelvic region.  Pt. to be independent with HEP, joint protection principles, improved posture and body mechanics.         Plan  Assess response to last session. Progress B LE strengthening, flexibility and MT as tolerated.    Treatment Last 4 Visits  Treatment Day: 3       7/9/2025 7/14/2025 7/21/2025   LE Treatment   Therapeutic Exercise  SKTC x 10 B   ITB stretch 3 x 30 secs  Bridges 2 x 10   SL clams 2 x 10   SL abduction x 10 B   Shuttle 62# x 20 reps   Standing hip extension x 20 B   Tandem stance 2 x  30 secs B - minimal UE support at raised plinth   Step  ups on to Airex pad x 10 B    Nustep x 5  A/P board x 10  Hip flexors stretch on 8 inch step x 10  Gait in // bars  fwd/bwd/lat x 2 laps  Sand dune  *step up x 10  *fwd lunge x 10  *lat lunge x 10  *mini squat x 10  TKE c ball against wall x 10  Shuttle B 62# 2 x 15  DKTC c SB x 10  LTR c SB x 10  Bridge c SB x 10  Passive stretching HS and ITB by PT   Manual Therapy   Patellar mobs  STM B knees  STM ITB insert and distal HS   Modalities Ice x 5 min  Ice x 5 min B knees   Therapeutic Exercise Minutes 10  35   Manual Therapy Minutes   10   Evaluation Minutes 35     Hot/Cold Pack Minutes 5  5   Total Time Of Timed Procedures 10 0 45   Total Time Of Service-Based Procedures 40 0 5   Total Treatment Time 50 0 50   HEP Joint protection principles  POC education  Pain management  SKTC  HS stretch with flossing  ITB stretch with strap  ice SKTC  HS stretch with flossing  ITB stretch with strap   SKTC   HS stretch with flossing   ITB stretch with strap           HEP  SKTC   HS stretch with flossing   ITB stretch with strap       Charges     TE 2 MT

## 2025-07-24 ENCOUNTER — TELEPHONE (OUTPATIENT)
Dept: PHYSICAL THERAPY | Facility: HOSPITAL | Age: 75
End: 2025-07-24

## 2025-07-24 ENCOUNTER — APPOINTMENT (OUTPATIENT)
Dept: PHYSICAL THERAPY | Facility: HOSPITAL | Age: 75
End: 2025-07-24
Payer: MEDICARE

## 2025-07-28 ENCOUNTER — TELEPHONE (OUTPATIENT)
Dept: PHYSICAL THERAPY | Facility: HOSPITAL | Age: 75
End: 2025-07-28

## 2025-07-29 ENCOUNTER — APPOINTMENT (OUTPATIENT)
Dept: PHYSICAL THERAPY | Facility: HOSPITAL | Age: 75
End: 2025-07-29
Payer: MEDICARE

## 2025-07-30 ENCOUNTER — OFFICE VISIT (OUTPATIENT)
Dept: PHYSICAL THERAPY | Facility: HOSPITAL | Age: 75
End: 2025-07-30
Payer: MEDICARE

## 2025-07-30 PROCEDURE — 97140 MANUAL THERAPY 1/> REGIONS: CPT

## 2025-07-30 PROCEDURE — 97110 THERAPEUTIC EXERCISES: CPT

## 2025-07-31 ENCOUNTER — APPOINTMENT (OUTPATIENT)
Dept: PHYSICAL THERAPY | Facility: HOSPITAL | Age: 75
End: 2025-07-31
Payer: MEDICARE

## 2025-08-04 ENCOUNTER — APPOINTMENT (OUTPATIENT)
Dept: PHYSICAL THERAPY | Facility: HOSPITAL | Age: 75
End: 2025-08-04

## 2025-08-06 ENCOUNTER — APPOINTMENT (OUTPATIENT)
Dept: PHYSICAL THERAPY | Facility: HOSPITAL | Age: 75
End: 2025-08-06

## 2025-08-11 ENCOUNTER — APPOINTMENT (OUTPATIENT)
Dept: PHYSICAL THERAPY | Facility: HOSPITAL | Age: 75
End: 2025-08-11

## 2025-08-13 ENCOUNTER — OFFICE VISIT (OUTPATIENT)
Dept: PHYSICAL THERAPY | Facility: HOSPITAL | Age: 75
End: 2025-08-13

## 2025-08-13 PROCEDURE — 97110 THERAPEUTIC EXERCISES: CPT

## 2025-08-13 PROCEDURE — 97140 MANUAL THERAPY 1/> REGIONS: CPT

## (undated) DEVICE — GLOVE SUR 7.5 SENSICARE PI PIP CRM PWD F

## (undated) DEVICE — 3M™ IOBAN™ 2 ANTIMICROBIAL INCISE DRAPE 6648EZ: Brand: IOBAN™ 2

## (undated) DEVICE — 3M™ RED DOT™ MONITORING ELECTRODE WITH FOAM TAPE AND STICKY GEL, 50/BAG, 20/CASE, 72/PLT 2570: Brand: RED DOT™

## (undated) DEVICE — 1200CC GUARDIAN II: Brand: GUARDIAN

## (undated) DEVICE — TROCAR: Brand: KII FIOS FIRST ENTRY

## (undated) DEVICE — BLADELESS OBTURATOR: Brand: WECK VISTA

## (undated) DEVICE — #15 STERILE STAINLESS BLADE: Brand: STERILE STAINLESS BLADES

## (undated) DEVICE — SUT MCRYL 4-0 18IN PS-2 ABSRB UD 19MM 3/8 CIR

## (undated) DEVICE — FILTERLINE NASAL ADULT O2/CO2

## (undated) DEVICE — FORCEP BIOPSY RJ4 LG CAP W/ND

## (undated) DEVICE — COLUMN DRAPE

## (undated) DEVICE — 40580 - THE PINK PAD - ADVANCED TRENDELENBURG POSITIONING KIT: Brand: 40580 - THE PINK PAD - ADVANCED TRENDELENBURG POSITIONING KIT

## (undated) DEVICE — Device: Brand: DEFENDO AIR/WATER/SUCTION AND BIOPSY VALVE

## (undated) DEVICE — CADIERE FORCEPS: Brand: ENDOWRIST

## (undated) DEVICE — ADHESIVE SKIN TOP FOR WND CLSR DERMBND ADV

## (undated) DEVICE — SUT PERMA- 3-0 30IN SH NABSRB BLK 26MM 1/2

## (undated) DEVICE — VESSEL SEALER EXTEND: Brand: ENDOWRIST

## (undated) DEVICE — SOLUTION IRRIG 1000ML 0.9% NACL USP BTL

## (undated) DEVICE — 2, DISPOSABLE SUCTION/IRRIGATOR WITHOUT DISPOSABLE TIP: Brand: STRYKEFLOW

## (undated) DEVICE — CANNULA SEAL

## (undated) DEVICE — SUTURE ETHBND XL 2-0 30IN NABSRB GRN 26MM SH 1/2 CIR

## (undated) DEVICE — ROBOTIC GENERAL: Brand: MEDLINE INDUSTRIES, INC.

## (undated) DEVICE — MEGA SUTURECUT ND: Brand: ENDOWRIST

## (undated) DEVICE — ENDOSCOPY PACK UPPER: Brand: MEDLINE INDUSTRIES, INC.

## (undated) DEVICE — MEDI-VAC SUCTION HANDLE REGULAR CAPACITY: Brand: CARDINAL HEALTH

## (undated) DEVICE — LAPAROVUE VISIBILITY SYSTEM LAPAROSCOPIC SOLUTIONS: Brand: LAPAROVUE

## (undated) DEVICE — TIP-UP FENESTRATED GRASPER: Brand: ENDOWRIST

## (undated) DEVICE — DISPOSABLE GRASPER: Brand: EPIX LAPAROSCOPIC GRASPER

## (undated) DEVICE — [HIGH FLOW INSUFFLATOR,  DO NOT USE IF PACKAGE IS DAMAGED,  KEEP DRY,  KEEP AWAY FROM SUNLIGHT,  PROTECT FROM HEAT AND RADIOACTIVE SOURCES.]: Brand: PNEUMOSURE

## (undated) DEVICE — ANCHOR TISSUE RETRIEVAL SYSTEM, BAG SIZE 125 ML, PORT SIZE 8 MM: Brand: ANCHOR TISSUE RETRIEVAL SYSTEM

## (undated) DEVICE — SUT ETHBND XL 0 36IN SH NABSRB GRN 26MM 1/2

## (undated) DEVICE — STRL PENROSE DRAIN 18" X 1/2": Brand: CARDINAL HEALTH

## (undated) DEVICE — SPONGE LAP L4IN KTNR STRUNG RADPQ ST

## (undated) DEVICE — LARGE NEEDLE DRIVER: Brand: ENDOWRIST

## (undated) DEVICE — ARM DRAPE

## (undated) DEVICE — COVER LT HNDL RIG FOR SUR CAM DISP

## (undated) DEVICE — MEDI-VAC NON-CONDUCTIVE SUCTION TUBING: Brand: CARDINAL HEALTH

## (undated) DEVICE — GLOVE SUR 6 SENSICARE PI PIP CRM PWD F

## (undated) NOTE — LETTER
Patient Name: Gustavo Mcneil  YOB: 1950          MRN number:  [id number]  Date:  11/4/2019  Referring Physician:  Ms. Travis Tolentino PA-C    Discharge Summary  Pt has attended 8 visits in Physical Therapy.       Dx:  Chronic bilateral low back [place] · Pt will have decreased paraspinal mm tension to tolerate standing >2 hours for activities of daily living and home activities -MET  · Pt will demonstrate improved core strength to be able to perform lifting with <1/10 pain -MET  · Pt will be independent

## (undated) NOTE — Clinical Note
Ms. Becerra is doing well.  Please see attached note for an update on her operation and pathology. Please feel free to call me with any questions at [phone].  Thank you,  [facility]

## (undated) NOTE — LETTER
12/07/18        [address] [facility]      Dear Nabila Leach,    [facility] records indicate that you have outstanding lab work and or testing that was ordered for you and has not yet been completed:  Orders Placed This Encoun

## (undated) NOTE — Clinical Note
Ms. Becerra is doing well.  Please see attached note for an update on her operation and pathology. Please feel free to call me with any questions at [phone].  Thank you,  [facility]

## (undated) NOTE — LETTER
5/8/2023  [address]  [id number]    Dear Ebonie Leigh,    My office staff has attempted to contact you at my request.  It is important for your health and well-being that you contact my office for the following reason(s):      __X_ Schedule your Supervisit for 2023    ___ Schedule laboratory/radiology testing    ___ Call to discuss results of testing and/or be advised of treatment changes      ___      Other:      I believe that the relationship between a physician and their patient is one of communication and mutual cooperation. It is important for the patient to follow through with the recommendations made by their Doctor in order to achieve the best possible outcome. Please contact our office at [phone].     Sincerely,    [address], Trevon Craft MD

## (undated) NOTE — LETTER
[facility]  [address] [facility], [address] [place] [address]    Consent for Operation    Date: __________________    Time: _______________    1.  I authorize the performance upon Anshul Mckenzie the following operation:    Procedure(s):   Esophagogastrod procedure has been videotaped, the surgeon will obtain the original videotape. The hospital will not be responsible for storage or maintenance of this tape.     6. For the purpose of advancing medical education, I consent to the admittance of observers to t STATEMENTS REQUIRING INSERTION OR COMPLETION WERE FILLED IN.     Signature of Patient:   ___________________________    When the patient is a minor or mentally incompetent to give consent:  Signature of person authorized to consent for patient: ____________ supplements, and pills I can buy without a prescription (including street drugs/illegal medications). Failure to inform my anesthesiologist about these medicines may increase my risk of anesthetic complications.   · If I am allergic to anything or have had Anesthesiologist Signature     Date   Time  I have discussed the procedure and information above with the patient (or patient’s representative) and answered their questions. The patient or their representative has agreed to have anesthesia services.     ___